# Patient Record
Sex: MALE | Race: WHITE | Employment: OTHER | URBAN - NONMETROPOLITAN AREA
[De-identification: names, ages, dates, MRNs, and addresses within clinical notes are randomized per-mention and may not be internally consistent; named-entity substitution may affect disease eponyms.]

---

## 2017-01-23 ENCOUNTER — OFFICE VISIT (OUTPATIENT)
Dept: FAMILY MEDICINE CLINIC | Age: 42
End: 2017-01-23

## 2017-01-23 VITALS
DIASTOLIC BLOOD PRESSURE: 84 MMHG | HEART RATE: 106 BPM | SYSTOLIC BLOOD PRESSURE: 124 MMHG | RESPIRATION RATE: 16 BRPM | BODY MASS INDEX: 33.5 KG/M2 | WEIGHT: 247 LBS

## 2017-01-23 DIAGNOSIS — T07.XXXA MULTIPLE TRAUMA: ICD-10-CM

## 2017-01-23 DIAGNOSIS — R52 PAIN: ICD-10-CM

## 2017-01-23 DIAGNOSIS — S22.5XXD: Primary | ICD-10-CM
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
## 2017-01-23 DIAGNOSIS — S27.339S: ICD-10-CM

## 2017-01-23 DIAGNOSIS — I10 ESSENTIAL HYPERTENSION: ICD-10-CM

## 2017-01-23 DIAGNOSIS — S06.305D: ICD-10-CM

## 2017-01-23 DIAGNOSIS — S37.031S: ICD-10-CM

## 2017-01-23 DIAGNOSIS — S06.9X5D TRAUMATIC BRAIN INJURY, WITH LOSS OF CONSCIOUSNESS GREATER THAN 24 HOURS WITH RETURN TO PRE-EXISTING CONSCIOUS LEVEL, SUBSEQUENT ENCOUNTER: ICD-10-CM

## 2017-01-23 PROBLEM — S06.9XAA TRAUMATIC BRAIN INJURY (HCC): Status: ACTIVE | Noted: 2017-01-23

## 2017-01-23 PROBLEM — S27.339A LUNG LACERATION: Status: ACTIVE | Noted: 2017-01-23

## 2017-01-23 PROBLEM — S37.031A LACERATION OF RIGHT KIDNEY: Status: ACTIVE | Noted: 2017-01-23

## 2017-01-23 PROBLEM — S06.30AA: Status: ACTIVE | Noted: 2017-01-23
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
## 2017-01-23 PROCEDURE — 99214 OFFICE O/P EST MOD 30 MIN: CPT | Performed by: FAMILY MEDICINE

## 2017-01-23 RX ORDER — AMANTADINE HYDROCHLORIDE 100 MG/1
100 CAPSULE, GELATIN COATED ORAL 2 TIMES DAILY
COMMUNITY
End: 2017-01-23 | Stop reason: SDUPTHER

## 2017-01-23 RX ORDER — HYDROCODONE BITARTRATE AND ACETAMINOPHEN 5; 325 MG/1; MG/1
1 TABLET ORAL EVERY 6 HOURS PRN
Qty: 30 TABLET | Refills: 0 | Status: SHIPPED | OUTPATIENT
Start: 2017-01-23 | End: 2017-02-01 | Stop reason: SDUPTHER

## 2017-01-23 RX ORDER — GABAPENTIN 300 MG/1
300 CAPSULE ORAL EVERY 8 HOURS
COMMUNITY
End: 2017-01-23 | Stop reason: SDUPTHER

## 2017-01-23 RX ORDER — OXYCODONE HYDROCHLORIDE 5 MG/1
5 CAPSULE ORAL EVERY 4 HOURS PRN
COMMUNITY
End: 2017-02-01 | Stop reason: ALTCHOICE

## 2017-01-23 RX ORDER — ACETAMINOPHEN 500 MG
500 TABLET ORAL EVERY 6 HOURS PRN
COMMUNITY

## 2017-01-23 RX ORDER — GABAPENTIN 300 MG/1
300 CAPSULE ORAL EVERY 8 HOURS
Qty: 90 CAPSULE | Refills: 3 | Status: SHIPPED | OUTPATIENT
Start: 2017-01-23 | End: 2017-03-15 | Stop reason: SDUPTHER

## 2017-01-23 RX ORDER — AMANTADINE HYDROCHLORIDE 100 MG/1
100 CAPSULE, GELATIN COATED ORAL 2 TIMES DAILY
Qty: 60 CAPSULE | Refills: 3 | Status: SHIPPED | OUTPATIENT
Start: 2017-01-23 | End: 2017-03-15 | Stop reason: SDUPTHER

## 2017-01-23 RX ORDER — TRAZODONE HYDROCHLORIDE 50 MG/1
50 TABLET ORAL NIGHTLY
COMMUNITY
End: 2017-01-23 | Stop reason: SDUPTHER

## 2017-01-23 RX ORDER — TRAZODONE HYDROCHLORIDE 50 MG/1
50 TABLET ORAL NIGHTLY
Qty: 30 TABLET | Refills: 3 | Status: SHIPPED | OUTPATIENT
Start: 2017-01-23 | End: 2017-03-15 | Stop reason: SDUPTHER

## 2017-01-23 RX ORDER — BUSPIRONE HYDROCHLORIDE 10 MG/1
10 TABLET ORAL 2 TIMES DAILY
Qty: 60 TABLET | Refills: 3 | Status: SHIPPED | OUTPATIENT
Start: 2017-01-23 | End: 2017-03-15 | Stop reason: SDUPTHER

## 2017-01-23 RX ORDER — LACTOBACILLUS RHAMNOSUS GG 10B CELL
1 CAPSULE ORAL DAILY
Qty: 60 CAPSULE | Refills: 1 | Status: SHIPPED | OUTPATIENT
Start: 2017-01-23 | End: 2017-03-15 | Stop reason: SDUPTHER

## 2017-01-23 RX ORDER — BUSPIRONE HYDROCHLORIDE 10 MG/1
10 TABLET ORAL 2 TIMES DAILY
COMMUNITY
End: 2017-01-23 | Stop reason: SDUPTHER

## 2017-01-23 ASSESSMENT — PATIENT HEALTH QUESTIONNAIRE - PHQ9
1. LITTLE INTEREST OR PLEASURE IN DOING THINGS: 1
SUM OF ALL RESPONSES TO PHQ QUESTIONS 1-9: 2
2. FEELING DOWN, DEPRESSED OR HOPELESS: 1
SUM OF ALL RESPONSES TO PHQ9 QUESTIONS 1 & 2: 2

## 2017-02-01 ENCOUNTER — OFFICE VISIT (OUTPATIENT)
Dept: FAMILY MEDICINE CLINIC | Age: 42
End: 2017-02-01

## 2017-02-01 VITALS
HEART RATE: 100 BPM | WEIGHT: 255 LBS | SYSTOLIC BLOOD PRESSURE: 144 MMHG | DIASTOLIC BLOOD PRESSURE: 88 MMHG | BODY MASS INDEX: 34.58 KG/M2 | OXYGEN SATURATION: 96 % | TEMPERATURE: 98.7 F

## 2017-02-01 DIAGNOSIS — I10 ESSENTIAL HYPERTENSION: ICD-10-CM

## 2017-02-01 DIAGNOSIS — J40 BRONCHITIS: Primary | ICD-10-CM

## 2017-02-01 DIAGNOSIS — T07.XXXA MULTIPLE TRAUMA: ICD-10-CM

## 2017-02-01 DIAGNOSIS — S22.5XXD: ICD-10-CM

## 2017-02-01 PROCEDURE — 99214 OFFICE O/P EST MOD 30 MIN: CPT | Performed by: FAMILY MEDICINE

## 2017-02-01 RX ORDER — HYDROCODONE BITARTRATE AND ACETAMINOPHEN 5; 325 MG/1; MG/1
1 TABLET ORAL EVERY 6 HOURS PRN
Qty: 60 TABLET | Refills: 0 | Status: SHIPPED | OUTPATIENT
Start: 2017-02-01 | End: 2017-03-01 | Stop reason: SDUPTHER

## 2017-02-01 RX ORDER — AZITHROMYCIN 250 MG/1
TABLET, FILM COATED ORAL
Qty: 1 PACKET | Refills: 0 | Status: SHIPPED | OUTPATIENT
Start: 2017-02-01 | End: 2017-03-15 | Stop reason: SDUPTHER

## 2017-02-01 RX ORDER — METHYLPREDNISOLONE 4 MG/1
TABLET ORAL
Qty: 1 KIT | Refills: 0 | Status: SHIPPED | OUTPATIENT
Start: 2017-02-01 | End: 2017-03-30 | Stop reason: ALTCHOICE

## 2017-02-01 ASSESSMENT — ENCOUNTER SYMPTOMS
RHINORRHEA: 1
COUGH: 1
SINUS PRESSURE: 0
CONSTIPATION: 0
SORE THROAT: 0
CHEST TIGHTNESS: 0
DIARRHEA: 0
VOMITING: 0
ABDOMINAL PAIN: 0
SHORTNESS OF BREATH: 0
WHEEZING: 1
NAUSEA: 0
ALLERGIC/IMMUNOLOGIC NEGATIVE: 1
BLOOD IN STOOL: 0
BACK PAIN: 0

## 2017-03-01 ENCOUNTER — OFFICE VISIT (OUTPATIENT)
Dept: FAMILY MEDICINE CLINIC | Age: 42
End: 2017-03-01

## 2017-03-01 VITALS
WEIGHT: 254 LBS | HEIGHT: 72 IN | SYSTOLIC BLOOD PRESSURE: 122 MMHG | RESPIRATION RATE: 20 BRPM | BODY MASS INDEX: 34.4 KG/M2 | DIASTOLIC BLOOD PRESSURE: 80 MMHG | OXYGEN SATURATION: 98 % | HEART RATE: 106 BPM

## 2017-03-01 DIAGNOSIS — B00.9 HERPES: ICD-10-CM

## 2017-03-01 DIAGNOSIS — S06.305S: ICD-10-CM

## 2017-03-01 DIAGNOSIS — S22.5XXD: ICD-10-CM

## 2017-03-01 DIAGNOSIS — J45.40 MODERATE PERSISTENT ASTHMA WITHOUT COMPLICATION: Primary | ICD-10-CM

## 2017-03-01 DIAGNOSIS — I10 ESSENTIAL HYPERTENSION: ICD-10-CM

## 2017-03-01 DIAGNOSIS — T07.XXXA MULTIPLE TRAUMA: ICD-10-CM

## 2017-03-01 DIAGNOSIS — S27.339S: ICD-10-CM

## 2017-03-01 DIAGNOSIS — S37.031S: ICD-10-CM
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
## 2017-03-01 DIAGNOSIS — S06.9X5S TRAUMATIC BRAIN INJURY, WITH LOSS OF CONSCIOUSNESS GREATER THAN 24 HOURS WITH RETURN TO PRE-EXISTING CONSCIOUS LEVEL, SEQUELA (HCC): ICD-10-CM

## 2017-03-01 PROBLEM — S37.031A LACERATION OF RIGHT KIDNEY: Status: RESOLVED | Noted: 2017-01-23 | Resolved: 2017-03-01

## 2017-03-01 PROCEDURE — 99214 OFFICE O/P EST MOD 30 MIN: CPT | Performed by: FAMILY MEDICINE

## 2017-03-01 RX ORDER — ALBUTEROL SULFATE 90 UG/1
2 AEROSOL, METERED RESPIRATORY (INHALATION) EVERY 6 HOURS PRN
Qty: 1 INHALER | Refills: 3 | Status: SHIPPED | OUTPATIENT
Start: 2017-03-01 | End: 2017-07-07 | Stop reason: SDUPTHER

## 2017-03-01 RX ORDER — VALACYCLOVIR HYDROCHLORIDE 1 G/1
1000 TABLET, FILM COATED ORAL 3 TIMES DAILY
Qty: 90 TABLET | Refills: 3 | Status: SHIPPED | OUTPATIENT
Start: 2017-03-01 | End: 2018-03-07 | Stop reason: SDUPTHER

## 2017-03-01 RX ORDER — HYDROCODONE BITARTRATE AND ACETAMINOPHEN 5; 325 MG/1; MG/1
1 TABLET ORAL EVERY 6 HOURS PRN
Qty: 60 TABLET | Refills: 0 | Status: SHIPPED | OUTPATIENT
Start: 2017-03-01 | End: 2017-03-15 | Stop reason: SDUPTHER

## 2017-03-05 PROBLEM — S27.339A LUNG LACERATION: Status: RESOLVED | Noted: 2017-01-23 | Resolved: 2017-03-05

## 2017-03-05 PROBLEM — S22.5XXD: Status: RESOLVED | Noted: 2017-01-23 | Resolved: 2017-03-05

## 2017-03-05 ASSESSMENT — ENCOUNTER SYMPTOMS
SINUS PRESSURE: 0
DIARRHEA: 0
NAUSEA: 0
SORE THROAT: 0
CHEST TIGHTNESS: 0
ALLERGIC/IMMUNOLOGIC NEGATIVE: 1
BACK PAIN: 0
RHINORRHEA: 1
BLOOD IN STOOL: 0
WHEEZING: 0
CONSTIPATION: 0
SHORTNESS OF BREATH: 0
VOMITING: 0
ABDOMINAL PAIN: 0

## 2017-03-15 ENCOUNTER — OFFICE VISIT (OUTPATIENT)
Dept: FAMILY MEDICINE CLINIC | Age: 42
End: 2017-03-15

## 2017-03-15 VITALS
RESPIRATION RATE: 20 BRPM | WEIGHT: 252 LBS | HEIGHT: 72 IN | OXYGEN SATURATION: 97 % | BODY MASS INDEX: 34.13 KG/M2 | DIASTOLIC BLOOD PRESSURE: 80 MMHG | HEART RATE: 116 BPM | SYSTOLIC BLOOD PRESSURE: 124 MMHG

## 2017-03-15 DIAGNOSIS — S22.5XXD: ICD-10-CM

## 2017-03-15 DIAGNOSIS — J06.9 VIRAL UPPER RESPIRATORY TRACT INFECTION: Primary | ICD-10-CM

## 2017-03-15 DIAGNOSIS — R52 PAIN: ICD-10-CM

## 2017-03-15 DIAGNOSIS — S06.9X5D TRAUMATIC BRAIN INJURY, WITH LOSS OF CONSCIOUSNESS GREATER THAN 24 HOURS WITH RETURN TO PRE-EXISTING CONSCIOUS LEVEL, SUBSEQUENT ENCOUNTER: ICD-10-CM

## 2017-03-15 DIAGNOSIS — J40 BRONCHITIS: ICD-10-CM

## 2017-03-15 DIAGNOSIS — R19.7 DIARRHEA, UNSPECIFIED TYPE: ICD-10-CM

## 2017-03-15 DIAGNOSIS — T07.XXXA MULTIPLE TRAUMA: ICD-10-CM

## 2017-03-15 PROCEDURE — 99214 OFFICE O/P EST MOD 30 MIN: CPT | Performed by: FAMILY MEDICINE

## 2017-03-15 RX ORDER — BUSPIRONE HYDROCHLORIDE 10 MG/1
10 TABLET ORAL 2 TIMES DAILY
Qty: 60 TABLET | Refills: 3 | Status: SHIPPED | OUTPATIENT
Start: 2017-03-15 | End: 2017-05-03 | Stop reason: ALTCHOICE

## 2017-03-15 RX ORDER — AMANTADINE HYDROCHLORIDE 100 MG/1
100 CAPSULE, GELATIN COATED ORAL 2 TIMES DAILY
Qty: 60 CAPSULE | Refills: 3 | Status: SHIPPED | OUTPATIENT
Start: 2017-03-15 | End: 2017-03-30 | Stop reason: SDUPTHER

## 2017-03-15 RX ORDER — PREDNISONE 10 MG/1
10 TABLET ORAL 2 TIMES DAILY
Qty: 20 TABLET | Refills: 0 | Status: SHIPPED | OUTPATIENT
Start: 2017-03-15 | End: 2017-03-30 | Stop reason: ALTCHOICE

## 2017-03-15 RX ORDER — HYDROCODONE BITARTRATE AND ACETAMINOPHEN 5; 325 MG/1; MG/1
1 TABLET ORAL EVERY 6 HOURS PRN
Qty: 60 TABLET | Refills: 0 | Status: SHIPPED | OUTPATIENT
Start: 2017-03-15 | End: 2017-03-30 | Stop reason: SDUPTHER

## 2017-03-15 RX ORDER — GABAPENTIN 300 MG/1
300 CAPSULE ORAL EVERY 8 HOURS
Qty: 90 CAPSULE | Refills: 3 | Status: SHIPPED | OUTPATIENT
Start: 2017-03-15 | End: 2017-05-03 | Stop reason: ALTCHOICE

## 2017-03-15 RX ORDER — AZITHROMYCIN 250 MG/1
TABLET, FILM COATED ORAL
Qty: 1 PACKET | Refills: 0 | Status: SHIPPED | OUTPATIENT
Start: 2017-03-15 | End: 2017-03-25

## 2017-03-15 RX ORDER — TRAZODONE HYDROCHLORIDE 50 MG/1
50 TABLET ORAL NIGHTLY
Qty: 30 TABLET | Refills: 3 | Status: SHIPPED | OUTPATIENT
Start: 2017-03-15 | End: 2017-05-03 | Stop reason: ALTCHOICE

## 2017-03-15 RX ORDER — LACTOBACILLUS RHAMNOSUS GG 10B CELL
1 CAPSULE ORAL DAILY
Qty: 60 CAPSULE | Refills: 1 | Status: SHIPPED | OUTPATIENT
Start: 2017-03-15 | End: 2017-03-30 | Stop reason: ALTCHOICE

## 2017-03-26 PROBLEM — J06.9 VIRAL UPPER RESPIRATORY TRACT INFECTION: Status: ACTIVE | Noted: 2017-03-26

## 2017-03-26 ASSESSMENT — ENCOUNTER SYMPTOMS
SORE THROAT: 0
VOMITING: 0
ALLERGIC/IMMUNOLOGIC NEGATIVE: 1
RHINORRHEA: 1
BACK PAIN: 0
NAUSEA: 0
ABDOMINAL PAIN: 0
CHEST TIGHTNESS: 0
BLOOD IN STOOL: 0
CONSTIPATION: 0
DIARRHEA: 0
SHORTNESS OF BREATH: 0
SINUS PRESSURE: 1
WHEEZING: 0

## 2017-03-30 ENCOUNTER — OFFICE VISIT (OUTPATIENT)
Dept: FAMILY MEDICINE CLINIC | Age: 42
End: 2017-03-30

## 2017-03-30 VITALS
DIASTOLIC BLOOD PRESSURE: 84 MMHG | HEIGHT: 72 IN | WEIGHT: 253 LBS | OXYGEN SATURATION: 97 % | SYSTOLIC BLOOD PRESSURE: 124 MMHG | BODY MASS INDEX: 34.27 KG/M2 | RESPIRATION RATE: 18 BRPM | HEART RATE: 85 BPM

## 2017-03-30 DIAGNOSIS — T07.XXXA MULTIPLE TRAUMA: ICD-10-CM

## 2017-03-30 DIAGNOSIS — S06.305D: ICD-10-CM

## 2017-03-30 DIAGNOSIS — S22.5XXD: ICD-10-CM

## 2017-03-30 DIAGNOSIS — S06.9X5D TRAUMATIC BRAIN INJURY, WITH LOSS OF CONSCIOUSNESS GREATER THAN 24 HOURS WITH RETURN TO PRE-EXISTING CONSCIOUS LEVEL, SUBSEQUENT ENCOUNTER: Primary | ICD-10-CM

## 2017-03-30 PROBLEM — J06.9 VIRAL UPPER RESPIRATORY TRACT INFECTION: Status: RESOLVED | Noted: 2017-03-26 | Resolved: 2017-03-30

## 2017-03-30 PROCEDURE — 99213 OFFICE O/P EST LOW 20 MIN: CPT | Performed by: FAMILY MEDICINE

## 2017-03-30 RX ORDER — HYDROCODONE BITARTRATE AND ACETAMINOPHEN 5; 325 MG/1; MG/1
1 TABLET ORAL EVERY 8 HOURS PRN
Qty: 30 TABLET | Refills: 0 | Status: SHIPPED | OUTPATIENT
Start: 2017-03-30 | End: 2017-04-29

## 2017-03-30 RX ORDER — AMANTADINE HYDROCHLORIDE 100 MG/1
100 CAPSULE, GELATIN COATED ORAL DAILY
Qty: 7 CAPSULE | Refills: 0
Start: 2017-03-30 | End: 2017-05-03 | Stop reason: ALTCHOICE

## 2017-03-30 ASSESSMENT — ENCOUNTER SYMPTOMS
RHINORRHEA: 1
ABDOMINAL PAIN: 0
SHORTNESS OF BREATH: 0
DIARRHEA: 0
WHEEZING: 0
VOMITING: 0
ALLERGIC/IMMUNOLOGIC NEGATIVE: 1
CHEST TIGHTNESS: 0
SINUS PRESSURE: 1
SORE THROAT: 0
BLOOD IN STOOL: 0
CONSTIPATION: 0
BACK PAIN: 0
NAUSEA: 0

## 2017-05-03 ENCOUNTER — OFFICE VISIT (OUTPATIENT)
Dept: FAMILY MEDICINE CLINIC | Age: 42
End: 2017-05-03

## 2017-05-03 VITALS
BODY MASS INDEX: 34.58 KG/M2 | RESPIRATION RATE: 16 BRPM | SYSTOLIC BLOOD PRESSURE: 138 MMHG | HEART RATE: 70 BPM | DIASTOLIC BLOOD PRESSURE: 88 MMHG | WEIGHT: 255 LBS

## 2017-05-03 DIAGNOSIS — S06.305S: ICD-10-CM

## 2017-05-03 DIAGNOSIS — M79.10 MYALGIA: ICD-10-CM

## 2017-05-03 DIAGNOSIS — I10 ESSENTIAL HYPERTENSION: Primary | ICD-10-CM

## 2017-05-03 PROCEDURE — 99213 OFFICE O/P EST LOW 20 MIN: CPT | Performed by: FAMILY MEDICINE

## 2017-05-03 RX ORDER — METHYLPREDNISOLONE 4 MG/1
TABLET ORAL
Qty: 1 KIT | Refills: 0 | Status: SHIPPED | OUTPATIENT
Start: 2017-05-03 | End: 2017-05-19 | Stop reason: ALTCHOICE

## 2017-05-03 RX ORDER — LISINOPRIL 10 MG/1
10 TABLET ORAL DAILY
Qty: 30 TABLET | Refills: 3 | Status: SHIPPED | OUTPATIENT
Start: 2017-05-03 | End: 2017-11-17 | Stop reason: SDUPTHER

## 2017-05-03 ASSESSMENT — ENCOUNTER SYMPTOMS
SINUS PRESSURE: 0
SORE THROAT: 0
DIARRHEA: 0
NAUSEA: 0
VOMITING: 0
RHINORRHEA: 0
ABDOMINAL PAIN: 0
WHEEZING: 0
CHEST TIGHTNESS: 0
ALLERGIC/IMMUNOLOGIC NEGATIVE: 1
BLOOD IN STOOL: 0
SHORTNESS OF BREATH: 0
CONSTIPATION: 0
BACK PAIN: 0

## 2017-05-19 ENCOUNTER — OFFICE VISIT (OUTPATIENT)
Dept: FAMILY MEDICINE CLINIC | Age: 42
End: 2017-05-19

## 2017-05-19 VITALS
OXYGEN SATURATION: 95 % | SYSTOLIC BLOOD PRESSURE: 132 MMHG | BODY MASS INDEX: 34.54 KG/M2 | HEART RATE: 105 BPM | HEIGHT: 72 IN | RESPIRATION RATE: 18 BRPM | WEIGHT: 255 LBS | DIASTOLIC BLOOD PRESSURE: 86 MMHG

## 2017-05-19 DIAGNOSIS — S06.9X5S TRAUMATIC BRAIN INJURY, WITH LOSS OF CONSCIOUSNESS GREATER THAN 24 HOURS WITH RETURN TO PRE-EXISTING CONSCIOUS LEVEL, SEQUELA (HCC): ICD-10-CM

## 2017-05-19 DIAGNOSIS — G89.29 CHRONIC LEFT-SIDED LOW BACK PAIN WITHOUT SCIATICA: Primary | Chronic | ICD-10-CM

## 2017-05-19 DIAGNOSIS — I10 ESSENTIAL HYPERTENSION: ICD-10-CM

## 2017-05-19 DIAGNOSIS — M54.50 CHRONIC LEFT-SIDED LOW BACK PAIN WITHOUT SCIATICA: Primary | Chronic | ICD-10-CM

## 2017-05-19 PROCEDURE — 99214 OFFICE O/P EST MOD 30 MIN: CPT | Performed by: FAMILY MEDICINE

## 2017-05-19 RX ORDER — TIZANIDINE 4 MG/1
4 TABLET ORAL NIGHTLY PRN
Qty: 30 TABLET | Refills: 2 | Status: SHIPPED | OUTPATIENT
Start: 2017-05-19 | End: 2017-11-17 | Stop reason: ALTCHOICE

## 2017-05-19 RX ORDER — TRAMADOL HYDROCHLORIDE 50 MG/1
50 TABLET ORAL EVERY 6 HOURS PRN
Qty: 60 TABLET | Refills: 2 | Status: SHIPPED | OUTPATIENT
Start: 2017-05-19 | End: 2017-11-17 | Stop reason: ALTCHOICE

## 2017-05-22 ASSESSMENT — ENCOUNTER SYMPTOMS
SINUS PRESSURE: 0
VOMITING: 0
WHEEZING: 0
RHINORRHEA: 0
DIARRHEA: 0
ALLERGIC/IMMUNOLOGIC NEGATIVE: 1
CONSTIPATION: 0
BACK PAIN: 0
SHORTNESS OF BREATH: 0
BLOOD IN STOOL: 0
ABDOMINAL PAIN: 0
CHEST TIGHTNESS: 0
SORE THROAT: 0
NAUSEA: 0

## 2017-07-07 ENCOUNTER — OFFICE VISIT (OUTPATIENT)
Dept: FAMILY MEDICINE CLINIC | Age: 42
End: 2017-07-07

## 2017-07-07 VITALS
BODY MASS INDEX: 33.94 KG/M2 | SYSTOLIC BLOOD PRESSURE: 128 MMHG | HEART RATE: 91 BPM | WEIGHT: 242.4 LBS | HEIGHT: 71 IN | DIASTOLIC BLOOD PRESSURE: 88 MMHG

## 2017-07-07 DIAGNOSIS — F41.9 ANXIETY: ICD-10-CM

## 2017-07-07 DIAGNOSIS — S06.305S: Primary | ICD-10-CM

## 2017-07-07 DIAGNOSIS — S06.9X5S TRAUMATIC BRAIN INJURY, WITH LOSS OF CONSCIOUSNESS GREATER THAN 24 HOURS WITH RETURN TO PRE-EXISTING CONSCIOUS LEVEL, SEQUELA (HCC): ICD-10-CM

## 2017-07-07 DIAGNOSIS — J45.40 MODERATE PERSISTENT ASTHMA WITHOUT COMPLICATION: ICD-10-CM

## 2017-07-07 DIAGNOSIS — I10 ESSENTIAL HYPERTENSION: ICD-10-CM
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
## 2017-07-07 DIAGNOSIS — T07.XXXA MULTIPLE TRAUMA: ICD-10-CM

## 2017-07-07 DIAGNOSIS — F43.25 ADJUSTMENT DISORDER WITH MIXED DISTURBANCE OF EMOTIONS AND CONDUCT: ICD-10-CM

## 2017-07-07 PROCEDURE — 99214 OFFICE O/P EST MOD 30 MIN: CPT | Performed by: FAMILY MEDICINE

## 2017-07-07 RX ORDER — DULOXETIN HYDROCHLORIDE 30 MG/1
30 CAPSULE, DELAYED RELEASE ORAL DAILY
Qty: 30 CAPSULE | Refills: 3 | Status: SHIPPED | OUTPATIENT
Start: 2017-07-07 | End: 2017-11-17 | Stop reason: SDUPTHER

## 2017-07-07 RX ORDER — ALBUTEROL SULFATE 90 UG/1
2 AEROSOL, METERED RESPIRATORY (INHALATION) EVERY 6 HOURS PRN
Qty: 1 INHALER | Refills: 3 | Status: SHIPPED | OUTPATIENT
Start: 2017-07-07 | End: 2017-11-17 | Stop reason: SDUPTHER

## 2017-07-07 ASSESSMENT — ENCOUNTER SYMPTOMS
WHEEZING: 0
CHEST TIGHTNESS: 0
BACK PAIN: 0
ABDOMINAL PAIN: 0
VOMITING: 0
CONSTIPATION: 0
SORE THROAT: 0
SHORTNESS OF BREATH: 0
RHINORRHEA: 0
BLOOD IN STOOL: 0
SINUS PRESSURE: 0
DIARRHEA: 0
ALLERGIC/IMMUNOLOGIC NEGATIVE: 1
NAUSEA: 0

## 2017-07-07 ASSESSMENT — PATIENT HEALTH QUESTIONNAIRE - PHQ9
2. FEELING DOWN, DEPRESSED OR HOPELESS: 1
1. LITTLE INTEREST OR PLEASURE IN DOING THINGS: 1
SUM OF ALL RESPONSES TO PHQ QUESTIONS 1-9: 2
SUM OF ALL RESPONSES TO PHQ9 QUESTIONS 1 & 2: 2

## 2017-07-24 ENCOUNTER — TELEPHONE (OUTPATIENT)
Dept: FAMILY MEDICINE CLINIC | Age: 42
End: 2017-07-24

## 2017-07-24 RX ORDER — ESOMEPRAZOLE MAGNESIUM 40 MG/1
40 CAPSULE, DELAYED RELEASE ORAL DAILY
Qty: 30 CAPSULE | Refills: 3 | Status: SHIPPED | OUTPATIENT
Start: 2017-07-24 | End: 2017-11-17 | Stop reason: SDUPTHER

## 2017-11-17 ENCOUNTER — OFFICE VISIT (OUTPATIENT)
Dept: FAMILY MEDICINE CLINIC | Age: 42
End: 2017-11-17

## 2017-11-17 VITALS
DIASTOLIC BLOOD PRESSURE: 88 MMHG | HEART RATE: 112 BPM | RESPIRATION RATE: 16 BRPM | OXYGEN SATURATION: 95 % | BODY MASS INDEX: 32.92 KG/M2 | WEIGHT: 236 LBS | SYSTOLIC BLOOD PRESSURE: 136 MMHG

## 2017-11-17 DIAGNOSIS — J01.90 ACUTE BACTERIAL SINUSITIS: ICD-10-CM

## 2017-11-17 DIAGNOSIS — S06.305S: ICD-10-CM

## 2017-11-17 DIAGNOSIS — F90.2 ATTENTION DEFICIT HYPERACTIVITY DISORDER (ADHD), COMBINED TYPE: ICD-10-CM
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
## 2017-11-17 DIAGNOSIS — F41.9 ANXIETY: ICD-10-CM

## 2017-11-17 DIAGNOSIS — Z72.0 TOBACCO USE: ICD-10-CM

## 2017-11-17 DIAGNOSIS — R53.83 FATIGUE, UNSPECIFIED TYPE: ICD-10-CM

## 2017-11-17 DIAGNOSIS — K21.9 GASTROESOPHAGEAL REFLUX DISEASE WITHOUT ESOPHAGITIS: ICD-10-CM

## 2017-11-17 DIAGNOSIS — S06.9X5S TRAUMATIC BRAIN INJURY, WITH LOSS OF CONSCIOUSNESS GREATER THAN 24 HOURS WITH RETURN TO PRE-EXISTING CONSCIOUS LEVEL, SEQUELA (HCC): ICD-10-CM

## 2017-11-17 DIAGNOSIS — F43.25 ADJUSTMENT DISORDER WITH MIXED DISTURBANCE OF EMOTIONS AND CONDUCT: ICD-10-CM

## 2017-11-17 DIAGNOSIS — R44.8 FEELS COLD: ICD-10-CM

## 2017-11-17 DIAGNOSIS — B96.89 ACUTE BACTERIAL SINUSITIS: ICD-10-CM

## 2017-11-17 DIAGNOSIS — I10 ESSENTIAL HYPERTENSION: ICD-10-CM

## 2017-11-17 DIAGNOSIS — J45.40 MODERATE PERSISTENT ASTHMA WITHOUT COMPLICATION: Primary | ICD-10-CM

## 2017-11-17 LAB
A/G RATIO: 1.6 (ref 1.1–2.2)
ALBUMIN SERPL-MCNC: 4.5 G/DL (ref 3.4–5)
ALP BLD-CCNC: 112 U/L (ref 40–129)
ALT SERPL-CCNC: 27 U/L (ref 10–40)
ANION GAP SERPL CALCULATED.3IONS-SCNC: 16 MMOL/L (ref 3–16)
AST SERPL-CCNC: 14 U/L (ref 15–37)
BASOPHILS ABSOLUTE: 0.1 K/UL (ref 0–0.2)
BASOPHILS RELATIVE PERCENT: 0.7 %
BILIRUB SERPL-MCNC: 0.4 MG/DL (ref 0–1)
BUN BLDV-MCNC: 10 MG/DL (ref 7–20)
C-REACTIVE PROTEIN: 3.1 MG/L (ref 0–5.1)
CALCIUM SERPL-MCNC: 9.7 MG/DL (ref 8.3–10.6)
CHLORIDE BLD-SCNC: 100 MMOL/L (ref 99–110)
CO2: 28 MMOL/L (ref 21–32)
CREAT SERPL-MCNC: 0.8 MG/DL (ref 0.9–1.3)
EOSINOPHILS ABSOLUTE: 0.5 K/UL (ref 0–0.6)
EOSINOPHILS RELATIVE PERCENT: 4.5 %
GFR AFRICAN AMERICAN: >60
GFR NON-AFRICAN AMERICAN: >60
GLOBULIN: 2.9 G/DL
GLUCOSE BLD-MCNC: 93 MG/DL (ref 70–99)
HCT VFR BLD CALC: 50.6 % (ref 40.5–52.5)
HEMOGLOBIN: 16.9 G/DL (ref 13.5–17.5)
LYMPHOCYTES ABSOLUTE: 2.1 K/UL (ref 1–5.1)
LYMPHOCYTES RELATIVE PERCENT: 18.8 %
MCH RBC QN AUTO: 31.1 PG (ref 26–34)
MCHC RBC AUTO-ENTMCNC: 33.4 G/DL (ref 31–36)
MCV RBC AUTO: 93.2 FL (ref 80–100)
MONOCYTES ABSOLUTE: 1 K/UL (ref 0–1.3)
MONOCYTES RELATIVE PERCENT: 9.1 %
NEUTROPHILS ABSOLUTE: 7.4 K/UL (ref 1.7–7.7)
NEUTROPHILS RELATIVE PERCENT: 66.9 %
PDW BLD-RTO: 13.8 % (ref 12.4–15.4)
PLATELET # BLD: 291 K/UL (ref 135–450)
PMV BLD AUTO: 8.4 FL (ref 5–10.5)
POTASSIUM SERPL-SCNC: 4 MMOL/L (ref 3.5–5.1)
RBC # BLD: 5.44 M/UL (ref 4.2–5.9)
SODIUM BLD-SCNC: 144 MMOL/L (ref 136–145)
TOTAL PROTEIN: 7.4 G/DL (ref 6.4–8.2)
TSH SERPL DL<=0.05 MIU/L-ACNC: 0.67 UIU/ML (ref 0.27–4.2)
WBC # BLD: 11.1 K/UL (ref 4–11)

## 2017-11-17 PROCEDURE — 99214 OFFICE O/P EST MOD 30 MIN: CPT | Performed by: FAMILY MEDICINE

## 2017-11-17 PROCEDURE — 4004F PT TOBACCO SCREEN RCVD TLK: CPT | Performed by: FAMILY MEDICINE

## 2017-11-17 PROCEDURE — G8417 CALC BMI ABV UP PARAM F/U: HCPCS | Performed by: FAMILY MEDICINE

## 2017-11-17 PROCEDURE — G8427 DOCREV CUR MEDS BY ELIG CLIN: HCPCS | Performed by: FAMILY MEDICINE

## 2017-11-17 PROCEDURE — G8484 FLU IMMUNIZE NO ADMIN: HCPCS | Performed by: FAMILY MEDICINE

## 2017-11-17 RX ORDER — LISINOPRIL 20 MG/1
20 TABLET ORAL DAILY
Qty: 90 TABLET | Refills: 3 | Status: SHIPPED | OUTPATIENT
Start: 2017-11-17 | End: 2018-03-07 | Stop reason: SDUPTHER

## 2017-11-17 RX ORDER — ESOMEPRAZOLE MAGNESIUM 40 MG/1
40 CAPSULE, DELAYED RELEASE ORAL DAILY
Qty: 30 CAPSULE | Refills: 3 | Status: SHIPPED | OUTPATIENT
Start: 2017-11-17 | End: 2017-11-27 | Stop reason: ALTCHOICE

## 2017-11-17 RX ORDER — DULOXETIN HYDROCHLORIDE 30 MG/1
30 CAPSULE, DELAYED RELEASE ORAL DAILY
Qty: 30 CAPSULE | Refills: 11 | Status: SHIPPED | OUTPATIENT
Start: 2017-11-17 | End: 2018-12-04 | Stop reason: DRUGHIGH

## 2017-11-17 RX ORDER — AZITHROMYCIN 250 MG/1
TABLET, FILM COATED ORAL
Qty: 1 PACKET | Refills: 0 | Status: SHIPPED | OUTPATIENT
Start: 2017-11-17 | End: 2018-03-07

## 2017-11-17 RX ORDER — ALBUTEROL SULFATE 90 UG/1
2 AEROSOL, METERED RESPIRATORY (INHALATION) EVERY 6 HOURS PRN
Qty: 1 INHALER | Refills: 3 | Status: SHIPPED | OUTPATIENT
Start: 2017-11-17 | End: 2018-03-07 | Stop reason: SDUPTHER

## 2017-11-17 ASSESSMENT — PATIENT HEALTH QUESTIONNAIRE - PHQ9
SUM OF ALL RESPONSES TO PHQ QUESTIONS 1-9: 0
SUM OF ALL RESPONSES TO PHQ9 QUESTIONS 1 & 2: 0
1. LITTLE INTEREST OR PLEASURE IN DOING THINGS: 0
2. FEELING DOWN, DEPRESSED OR HOPELESS: 0
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
## 2017-11-19 ASSESSMENT — ENCOUNTER SYMPTOMS
NAUSEA: 0
SINUS PRESSURE: 0
BACK PAIN: 0
DIARRHEA: 0
SHORTNESS OF BREATH: 0
ALLERGIC/IMMUNOLOGIC NEGATIVE: 1
BLOOD IN STOOL: 0
ABDOMINAL PAIN: 0
CHEST TIGHTNESS: 0
VOMITING: 0
WHEEZING: 0
SORE THROAT: 0
RHINORRHEA: 0
CONSTIPATION: 0
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
## 2017-11-20 NOTE — ASSESSMENT & PLAN NOTE
/88   Pulse 112   Resp 16   Wt 236 lb (107 kg)   SpO2 95%   BMI 32.92 kg/m²   On lisinopril with borderline bp

## 2018-03-07 ENCOUNTER — OFFICE VISIT (OUTPATIENT)
Dept: FAMILY MEDICINE CLINIC | Age: 43
End: 2018-03-07

## 2018-03-07 VITALS
RESPIRATION RATE: 16 BRPM | SYSTOLIC BLOOD PRESSURE: 152 MMHG | HEIGHT: 69 IN | DIASTOLIC BLOOD PRESSURE: 88 MMHG | BODY MASS INDEX: 38.66 KG/M2 | HEART RATE: 87 BPM | WEIGHT: 261 LBS | OXYGEN SATURATION: 98 %

## 2018-03-07 DIAGNOSIS — J45.40 MODERATE PERSISTENT ASTHMA WITHOUT COMPLICATION: ICD-10-CM

## 2018-03-07 DIAGNOSIS — I10 ESSENTIAL HYPERTENSION: ICD-10-CM

## 2018-03-07 DIAGNOSIS — B00.9 HERPES: ICD-10-CM

## 2018-03-07 DIAGNOSIS — S06.9X5S TRAUMATIC BRAIN INJURY, WITH LOSS OF CONSCIOUSNESS GREATER THAN 24 HOURS WITH RETURN TO PRE-EXISTING CONSCIOUS LEVEL, SEQUELA (HCC): ICD-10-CM

## 2018-03-07 DIAGNOSIS — S06.305S: ICD-10-CM

## 2018-03-07 DIAGNOSIS — J45.41 MODERATE PERSISTENT ASTHMA WITH ACUTE EXACERBATION: Primary | ICD-10-CM

## 2018-03-07 DIAGNOSIS — F43.25 ADJUSTMENT DISORDER WITH MIXED DISTURBANCE OF EMOTIONS AND CONDUCT: ICD-10-CM

## 2018-03-07 DIAGNOSIS — Z23 NEED FOR PROPHYLACTIC VACCINATION AGAINST STREPTOCOCCUS PNEUMONIAE (PNEUMOCOCCUS): ICD-10-CM

## 2018-03-07 PROBLEM — R44.8 FEELS COLD: Status: RESOLVED | Noted: 2017-11-17 | Resolved: 2018-03-07

## 2018-03-07 PROBLEM — M79.10 MYALGIA: Status: RESOLVED | Noted: 2017-05-03 | Resolved: 2018-03-07

## 2018-03-07 PROCEDURE — G8427 DOCREV CUR MEDS BY ELIG CLIN: HCPCS | Performed by: FAMILY MEDICINE

## 2018-03-07 PROCEDURE — 90471 IMMUNIZATION ADMIN: CPT | Performed by: FAMILY MEDICINE

## 2018-03-07 PROCEDURE — 99214 OFFICE O/P EST MOD 30 MIN: CPT | Performed by: FAMILY MEDICINE

## 2018-03-07 PROCEDURE — G8484 FLU IMMUNIZE NO ADMIN: HCPCS | Performed by: FAMILY MEDICINE

## 2018-03-07 PROCEDURE — 4004F PT TOBACCO SCREEN RCVD TLK: CPT | Performed by: FAMILY MEDICINE

## 2018-03-07 PROCEDURE — G8417 CALC BMI ABV UP PARAM F/U: HCPCS | Performed by: FAMILY MEDICINE

## 2018-03-07 PROCEDURE — 90732 PPSV23 VACC 2 YRS+ SUBQ/IM: CPT | Performed by: FAMILY MEDICINE

## 2018-03-07 RX ORDER — ALBUTEROL SULFATE 90 UG/1
2 AEROSOL, METERED RESPIRATORY (INHALATION) EVERY 6 HOURS PRN
Qty: 1 INHALER | Refills: 3 | Status: SHIPPED | OUTPATIENT
Start: 2018-03-07 | End: 2018-04-08 | Stop reason: SDUPTHER

## 2018-03-07 RX ORDER — VALACYCLOVIR HYDROCHLORIDE 1 G/1
1000 TABLET, FILM COATED ORAL 3 TIMES DAILY
Qty: 90 TABLET | Refills: 3 | Status: SHIPPED | OUTPATIENT
Start: 2018-03-07 | End: 2018-12-04 | Stop reason: SDUPTHER

## 2018-03-07 RX ORDER — FLUTICASONE FUROATE AND VILANTEROL 200; 25 UG/1; UG/1
1 POWDER RESPIRATORY (INHALATION) DAILY
Qty: 1 EACH | Refills: 5 | Status: SHIPPED | OUTPATIENT
Start: 2018-03-07 | End: 2018-11-29 | Stop reason: SDUPTHER

## 2018-03-07 RX ORDER — LISINOPRIL 20 MG/1
20 TABLET ORAL DAILY
Qty: 90 TABLET | Refills: 3 | Status: ON HOLD | OUTPATIENT
Start: 2018-03-07 | End: 2020-03-04

## 2018-03-07 ASSESSMENT — ENCOUNTER SYMPTOMS
DIARRHEA: 0
WHEEZING: 0
SINUS PRESSURE: 0
VOMITING: 0
BACK PAIN: 0
RHINORRHEA: 0
BLOOD IN STOOL: 0
NAUSEA: 0
ABDOMINAL PAIN: 0
SHORTNESS OF BREATH: 0
CHEST TIGHTNESS: 0
ALLERGIC/IMMUNOLOGIC NEGATIVE: 1
CONSTIPATION: 0
SORE THROAT: 0
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
## 2018-03-20 ENCOUNTER — OFFICE VISIT (OUTPATIENT)
Dept: FAMILY MEDICINE CLINIC | Age: 43
End: 2018-03-20

## 2018-03-20 VITALS
TEMPERATURE: 98.9 F | WEIGHT: 255.2 LBS | SYSTOLIC BLOOD PRESSURE: 126 MMHG | DIASTOLIC BLOOD PRESSURE: 86 MMHG | BODY MASS INDEX: 37.8 KG/M2 | HEIGHT: 69 IN | RESPIRATION RATE: 16 BRPM | HEART RATE: 102 BPM | OXYGEN SATURATION: 98 %

## 2018-03-20 DIAGNOSIS — J45.20 MILD INTERMITTENT ASTHMA WITHOUT COMPLICATION: ICD-10-CM

## 2018-03-20 DIAGNOSIS — J01.90 ACUTE BACTERIAL SINUSITIS: Primary | ICD-10-CM

## 2018-03-20 DIAGNOSIS — B96.89 ACUTE BACTERIAL SINUSITIS: Primary | ICD-10-CM

## 2018-03-20 PROCEDURE — G8427 DOCREV CUR MEDS BY ELIG CLIN: HCPCS | Performed by: FAMILY MEDICINE

## 2018-03-20 PROCEDURE — 99213 OFFICE O/P EST LOW 20 MIN: CPT | Performed by: FAMILY MEDICINE

## 2018-03-20 PROCEDURE — G8417 CALC BMI ABV UP PARAM F/U: HCPCS | Performed by: FAMILY MEDICINE

## 2018-03-20 PROCEDURE — 4004F PT TOBACCO SCREEN RCVD TLK: CPT | Performed by: FAMILY MEDICINE

## 2018-03-20 PROCEDURE — G8484 FLU IMMUNIZE NO ADMIN: HCPCS | Performed by: FAMILY MEDICINE

## 2018-03-20 RX ORDER — AZITHROMYCIN 250 MG/1
TABLET, FILM COATED ORAL
Qty: 1 PACKET | Refills: 0 | Status: SHIPPED | OUTPATIENT
Start: 2018-03-20 | End: 2018-03-30

## 2018-03-20 RX ORDER — BENZONATATE 200 MG/1
200 CAPSULE ORAL 3 TIMES DAILY PRN
Qty: 30 CAPSULE | Refills: 1 | Status: SHIPPED | OUTPATIENT
Start: 2018-03-20 | End: 2018-03-27

## 2018-03-20 NOTE — PROGRESS NOTES
Chief Complaint   Patient presents with    Cough     x2 days    Congestion     wheezing    Headache     SUBJECTIVE:  2 days worsening congestion and wheeze headache. Denies fever. OBJECTIVE:  /86   Pulse 102   Temp 98.9 °F (37.2 °C) (Oral)   Resp 16   Ht 5' 9\" (1.753 m)   Wt 255 lb 3.2 oz (115.8 kg)   SpO2 98%   BMI 37.69 kg/m²   Head moderate congestion  Tympanic membranes normal bilaterally  Pharynx clear  No cervical lymphadenopathy  Lungs clear bilaterally no wheezes no rhonchi heart regular rhythm and rate without a murmur  Abdomen soft nontender without masses    Assessment/Plan:  Marcela Mcclain was seen today for cough, congestion and headache. Diagnoses and all orders for this visit:    Acute bacterial sinusitis  -     azithromycin (ZITHROMAX) 250 MG tablet; Take 2 tabs (500 mg) on Day 1, and take 1 tab (250 mg) on days 2 through 5.  -     benzonatate (TESSALON) 200 MG capsule;  Take 1 capsule by mouth 3 times daily as needed for Cough    Mild intermittent asthma without complication

## 2018-04-08 ENCOUNTER — TELEPHONE (OUTPATIENT)
Dept: INTERNAL MEDICINE CLINIC | Age: 43
End: 2018-04-08

## 2018-04-08 DIAGNOSIS — J45.40 MODERATE PERSISTENT ASTHMA WITHOUT COMPLICATION: ICD-10-CM

## 2018-04-08 DIAGNOSIS — J45.20 MILD INTERMITTENT ASTHMA WITHOUT COMPLICATION: Primary | ICD-10-CM

## 2018-04-08 DIAGNOSIS — J45.909 PERSISTENT ASTHMA WITHOUT COMPLICATION, UNSPECIFIED ASTHMA SEVERITY: Primary | ICD-10-CM

## 2018-04-08 RX ORDER — IPRATROPIUM BROMIDE AND ALBUTEROL SULFATE 2.5; .5 MG/3ML; MG/3ML
1 SOLUTION RESPIRATORY (INHALATION) EVERY 4 HOURS
Qty: 360 ML | Refills: 3 | Status: SHIPPED | OUTPATIENT
Start: 2018-04-08 | End: 2018-12-04 | Stop reason: SDUPTHER

## 2018-04-08 RX ORDER — NEBULIZER ACCESSORIES
1 KIT MISCELLANEOUS DAILY PRN
Qty: 1 KIT | Refills: 0 | Status: SHIPPED | OUTPATIENT
Start: 2018-04-08

## 2018-04-08 RX ORDER — NEBULIZER AND COMPRESSOR
1 EACH MISCELLANEOUS ONCE
Qty: 1 EACH | Refills: 0 | Status: SHIPPED | OUTPATIENT
Start: 2018-04-08 | End: 2022-09-08

## 2018-04-08 RX ORDER — ALBUTEROL SULFATE 90 UG/1
2 AEROSOL, METERED RESPIRATORY (INHALATION) EVERY 4 HOURS PRN
Qty: 1 INHALER | Refills: 3 | Status: SHIPPED | OUTPATIENT
Start: 2018-04-08 | End: 2018-07-16 | Stop reason: SDUPTHER

## 2018-04-12 ENCOUNTER — TELEPHONE (OUTPATIENT)
Dept: FAMILY MEDICINE CLINIC | Age: 43
End: 2018-04-12

## 2018-04-12 ENCOUNTER — HOSPITAL ENCOUNTER (OUTPATIENT)
Dept: PULMONOLOGY | Age: 43
Discharge: HOME | End: 2018-04-12
Attending: FAMILY MEDICINE | Admitting: FAMILY MEDICINE

## 2018-04-17 DIAGNOSIS — J45.40 MODERATE PERSISTENT ASTHMA WITHOUT COMPLICATION: ICD-10-CM

## 2018-07-16 DIAGNOSIS — J45.40 MODERATE PERSISTENT ASTHMA WITHOUT COMPLICATION: ICD-10-CM

## 2018-07-16 RX ORDER — ALBUTEROL SULFATE 90 UG/1
2 AEROSOL, METERED RESPIRATORY (INHALATION) EVERY 4 HOURS PRN
Qty: 1 INHALER | Refills: 3 | Status: SHIPPED | OUTPATIENT
Start: 2018-07-16 | End: 2018-11-30

## 2018-09-17 ENCOUNTER — TELEPHONE (OUTPATIENT)
Dept: FAMILY MEDICINE CLINIC | Age: 43
End: 2018-09-17

## 2018-09-17 NOTE — TELEPHONE ENCOUNTER
Pt cld & left vm on ma line wanting us to call him. I left him a vm to call back to see how we could help him.     Pt's ph# [phone]

## 2018-09-21 NOTE — TELEPHONE ENCOUNTER
Pt was going to see if we were accepting new patients. I advised Dr Justo Jha is not; however our Nurse Practitioners and our Physician Assistant are accepting new patients. He will tell his girlfriend and have her call us. She needed a PCP.

## 2018-10-18 ENCOUNTER — TELEPHONE (OUTPATIENT)
Dept: FAMILY MEDICINE CLINIC | Age: 43
End: 2018-10-18

## 2018-12-04 ENCOUNTER — OFFICE VISIT (OUTPATIENT)
Dept: FAMILY MEDICINE CLINIC | Age: 43
End: 2018-12-04
Payer: COMMERCIAL

## 2018-12-04 VITALS
BODY MASS INDEX: 35.81 KG/M2 | SYSTOLIC BLOOD PRESSURE: 148 MMHG | HEIGHT: 69 IN | WEIGHT: 241.8 LBS | TEMPERATURE: 98.4 F | DIASTOLIC BLOOD PRESSURE: 98 MMHG | HEART RATE: 112 BPM | RESPIRATION RATE: 22 BRPM

## 2018-12-04 DIAGNOSIS — K21.9 GASTROESOPHAGEAL REFLUX DISEASE WITHOUT ESOPHAGITIS: ICD-10-CM

## 2018-12-04 DIAGNOSIS — I10 ESSENTIAL HYPERTENSION: ICD-10-CM

## 2018-12-04 DIAGNOSIS — J45.40 MODERATE PERSISTENT ASTHMA WITHOUT COMPLICATION: ICD-10-CM

## 2018-12-04 DIAGNOSIS — B00.9 HERPES: ICD-10-CM

## 2018-12-04 DIAGNOSIS — J20.8 ACUTE BRONCHITIS DUE TO OTHER SPECIFIED ORGANISMS: ICD-10-CM

## 2018-12-04 DIAGNOSIS — J45.20 MILD INTERMITTENT ASTHMA WITHOUT COMPLICATION: ICD-10-CM

## 2018-12-04 DIAGNOSIS — J20.9 ACUTE BRONCHITIS, UNSPECIFIED ORGANISM: Primary | ICD-10-CM

## 2018-12-04 DIAGNOSIS — F41.9 ANXIETY: ICD-10-CM

## 2018-12-04 DIAGNOSIS — F43.25 ADJUSTMENT DISORDER WITH MIXED DISTURBANCE OF EMOTIONS AND CONDUCT: ICD-10-CM

## 2018-12-04 PROCEDURE — 99214 OFFICE O/P EST MOD 30 MIN: CPT | Performed by: PHYSICIAN ASSISTANT

## 2018-12-04 PROCEDURE — 4004F PT TOBACCO SCREEN RCVD TLK: CPT | Performed by: PHYSICIAN ASSISTANT

## 2018-12-04 PROCEDURE — G8484 FLU IMMUNIZE NO ADMIN: HCPCS | Performed by: PHYSICIAN ASSISTANT

## 2018-12-04 PROCEDURE — G8427 DOCREV CUR MEDS BY ELIG CLIN: HCPCS | Performed by: PHYSICIAN ASSISTANT

## 2018-12-04 PROCEDURE — G8417 CALC BMI ABV UP PARAM F/U: HCPCS | Performed by: PHYSICIAN ASSISTANT

## 2018-12-04 RX ORDER — FLUTICASONE FUROATE AND VILANTEROL 200; 25 UG/1; UG/1
1 POWDER RESPIRATORY (INHALATION) DAILY
Qty: 1 EACH | Refills: 5 | Status: ON HOLD | OUTPATIENT
Start: 2018-12-04 | End: 2020-03-04

## 2018-12-04 RX ORDER — DULOXETIN HYDROCHLORIDE 30 MG/1
30 CAPSULE, DELAYED RELEASE ORAL DAILY
Qty: 30 CAPSULE | Refills: 11 | Status: CANCELLED | OUTPATIENT
Start: 2018-12-04

## 2018-12-04 RX ORDER — DULOXETIN HYDROCHLORIDE 60 MG/1
60 CAPSULE, DELAYED RELEASE ORAL DAILY
Qty: 30 CAPSULE | Refills: 5 | Status: ON HOLD | OUTPATIENT
Start: 2018-12-04 | End: 2020-03-04

## 2018-12-04 RX ORDER — AZITHROMYCIN 250 MG/1
250 TABLET, FILM COATED ORAL SEE ADMIN INSTRUCTIONS
Qty: 6 TABLET | Refills: 0 | Status: SHIPPED | OUTPATIENT
Start: 2018-12-04 | End: 2018-12-09

## 2018-12-04 RX ORDER — BENZONATATE 100 MG/1
100 CAPSULE ORAL 3 TIMES DAILY PRN
Qty: 30 CAPSULE | Refills: 0 | Status: SHIPPED | OUTPATIENT
Start: 2018-12-04 | End: 2018-12-11

## 2018-12-04 RX ORDER — VALACYCLOVIR HYDROCHLORIDE 1 G/1
1000 TABLET, FILM COATED ORAL 3 TIMES DAILY
Qty: 90 TABLET | Refills: 5 | Status: ON HOLD | OUTPATIENT
Start: 2018-12-04 | End: 2020-03-04

## 2018-12-04 RX ORDER — ALBUTEROL SULFATE 90 UG/1
2 AEROSOL, METERED RESPIRATORY (INHALATION) EVERY 6 HOURS PRN
Qty: 1 INHALER | Refills: 5 | Status: SHIPPED | OUTPATIENT
Start: 2018-12-04 | End: 2022-05-03 | Stop reason: SDUPTHER

## 2018-12-04 RX ORDER — METHYLPREDNISOLONE 4 MG/1
TABLET ORAL
Qty: 1 KIT | Refills: 0 | Status: SHIPPED | OUTPATIENT
Start: 2018-12-04 | End: 2018-12-10

## 2018-12-04 RX ORDER — IPRATROPIUM BROMIDE AND ALBUTEROL SULFATE 2.5; .5 MG/3ML; MG/3ML
1 SOLUTION RESPIRATORY (INHALATION) EVERY 4 HOURS
Qty: 360 ML | Refills: 5 | Status: ON HOLD | OUTPATIENT
Start: 2018-12-04 | End: 2020-03-04

## 2018-12-04 ASSESSMENT — PATIENT HEALTH QUESTIONNAIRE - PHQ9
SUM OF ALL RESPONSES TO PHQ QUESTIONS 1-9: 0
1. LITTLE INTEREST OR PLEASURE IN DOING THINGS: 0
SUM OF ALL RESPONSES TO PHQ QUESTIONS 1-9: 0
2. FEELING DOWN, DEPRESSED OR HOPELESS: 0
SUM OF ALL RESPONSES TO PHQ9 QUESTIONS 1 & 2: 0

## 2018-12-04 ASSESSMENT — ENCOUNTER SYMPTOMS
SINUS PAIN: 0
EYE DISCHARGE: 0
SORE THROAT: 0
SINUS PRESSURE: 0
DIARRHEA: 0
TROUBLE SWALLOWING: 0
NAUSEA: 0
WHEEZING: 1
SHORTNESS OF BREATH: 1
VOMITING: 0
RHINORRHEA: 1
COUGH: 1

## 2018-12-04 NOTE — PROGRESS NOTES
inhaler    Fluticasone Furoate-Vilanterol (BREO ELLIPTA) 200-25 MCG/INH AEPB    Acute bronchitis due to other specified organisms     Zpack, tessalon perles, medrol dose pack,  Rest/fluids/healhty diet            Other    Herpes     Refill medication         Relevant Medications    valACYclovir (VALTREX) 1 g tablet    Anxiety    Relevant Medications    DULoxetine (CYMBALTA) 60 MG extended release capsule    Adjustment disorder with mixed disturbance of emotions and conduct     Will increase cymbalta to 60 mg, Risks/benefits/SE reviewed, pt voices understanding  Recheck in 4-6 weeks if not improving, sooner if worse         Relevant Medications    DULoxetine (CYMBALTA) 60 MG extended release capsule               Return in about 4 months (around 4/4/2019) for CPE.

## 2019-10-15 ENCOUNTER — OFFICE VISIT (OUTPATIENT)
Dept: FAMILY MEDICINE CLINIC | Age: 44
End: 2019-10-15
Payer: COMMERCIAL

## 2019-10-15 VITALS
BODY MASS INDEX: 38.57 KG/M2 | OXYGEN SATURATION: 96 % | DIASTOLIC BLOOD PRESSURE: 100 MMHG | HEART RATE: 92 BPM | WEIGHT: 261.2 LBS | TEMPERATURE: 98.8 F | SYSTOLIC BLOOD PRESSURE: 140 MMHG

## 2019-10-15 DIAGNOSIS — R03.0 ELEVATED BLOOD PRESSURE READING: ICD-10-CM

## 2019-10-15 DIAGNOSIS — R21 RASH: Primary | ICD-10-CM

## 2019-10-15 PROCEDURE — 99213 OFFICE O/P EST LOW 20 MIN: CPT | Performed by: NURSE PRACTITIONER

## 2019-10-15 RX ORDER — TRIAMCINOLONE ACETONIDE 1 MG/G
CREAM TOPICAL
Qty: 1 TUBE | Refills: 0 | Status: ON HOLD | OUTPATIENT
Start: 2019-10-15 | End: 2020-03-04

## 2019-11-17 ASSESSMENT — ENCOUNTER SYMPTOMS
NAIL CHANGES: 0
EYE PAIN: 0
DIARRHEA: 0
RHINORRHEA: 0
NAUSEA: 0
COUGH: 0
VOMITING: 0
RESPIRATORY NEGATIVE: 1
SORE THROAT: 0
SHORTNESS OF BREATH: 0

## 2020-03-03 ENCOUNTER — APPOINTMENT (OUTPATIENT)
Dept: CT IMAGING | Age: 45
DRG: 871 | End: 2020-03-03
Payer: MEDICARE

## 2020-03-03 ENCOUNTER — APPOINTMENT (OUTPATIENT)
Dept: GENERAL RADIOLOGY | Age: 45
DRG: 871 | End: 2020-03-03
Payer: MEDICARE

## 2020-03-03 ENCOUNTER — HOSPITAL ENCOUNTER (INPATIENT)
Age: 45
LOS: 3 days | Discharge: HOME | DRG: 871 | End: 2020-03-06
Attending: EMERGENCY MEDICINE | Admitting: INTERNAL MEDICINE
Payer: MEDICARE

## 2020-03-03 PROBLEM — J18.9 PNEUMONIA: Status: ACTIVE | Noted: 2020-03-03

## 2020-03-03 LAB
ABO/RH: NORMAL
ALBUMIN SERPL-MCNC: 4.4 GM/DL (ref 3.4–5)
ALP BLD-CCNC: 100 IU/L (ref 40–128)
ALT SERPL-CCNC: 28 U/L (ref 10–40)
ANION GAP SERPL CALCULATED.3IONS-SCNC: 13 MMOL/L (ref 4–16)
ANTIBODY SCREEN: NEGATIVE
AST SERPL-CCNC: 16 IU/L (ref 15–37)
BASE EXCESS: ABNORMAL (ref 0–3.3)
BASOPHILS ABSOLUTE: 0 K/CU MM
BASOPHILS RELATIVE PERCENT: 0.2 % (ref 0–1)
BILIRUB SERPL-MCNC: 0.7 MG/DL (ref 0–1)
BUN BLDV-MCNC: 23 MG/DL (ref 6–23)
CALCIUM SERPL-MCNC: 8.8 MG/DL (ref 8.3–10.6)
CHLORIDE BLD-SCNC: 101 MMOL/L (ref 99–110)
CO2: 22 MMOL/L (ref 21–32)
COMMENT: ABNORMAL
CREAT SERPL-MCNC: 0.9 MG/DL (ref 0.9–1.3)
DIFFERENTIAL TYPE: ABNORMAL
EOSINOPHILS ABSOLUTE: 0 K/CU MM
EOSINOPHILS RELATIVE PERCENT: 0.3 % (ref 0–3)
GFR AFRICAN AMERICAN: >60 ML/MIN/1.73M2
GFR NON-AFRICAN AMERICAN: >60 ML/MIN/1.73M2
GLUCOSE BLD-MCNC: 147 MG/DL (ref 70–99)
HCO3 VENOUS: 23.1 MMOL/L (ref 19–25)
HCT VFR BLD CALC: 54.8 % (ref 42–52)
HEMOGLOBIN: 17.7 GM/DL (ref 13.5–18)
IMMATURE NEUTROPHIL %: 0.4 % (ref 0–0.43)
LACTATE: 2.5 MMOL/L (ref 0.4–2)
LACTATE: ABNORMAL MMOL/L (ref 0.4–2)
LIPASE: 13 IU/L (ref 13–60)
LYMPHOCYTES ABSOLUTE: 0.4 K/CU MM
LYMPHOCYTES RELATIVE PERCENT: 4.4 % (ref 24–44)
MCH RBC QN AUTO: 29.8 PG (ref 27–31)
MCHC RBC AUTO-ENTMCNC: 32.3 % (ref 32–36)
MCV RBC AUTO: 92.3 FL (ref 78–100)
MONOCYTES ABSOLUTE: 0.5 K/CU MM
MONOCYTES RELATIVE PERCENT: 5 % (ref 0–4)
NUCLEATED RBC %: 0 %
O2 SAT, VEN: 76 % (ref 50–70)
PCO2, VEN: 54 MMHG (ref 38–52)
PDW BLD-RTO: 12.4 % (ref 11.7–14.9)
PH VENOUS: 7.24 (ref 7.32–7.42)
PLATELET # BLD: 325 K/CU MM (ref 140–440)
PMV BLD AUTO: 9.5 FL (ref 7.5–11.1)
PO2, VEN: 42 MMHG (ref 28–48)
POTASSIUM SERPL-SCNC: 4.2 MMOL/L (ref 3.5–5.1)
PRO-BNP: 8.39 PG/ML
RAPID INFLUENZA  B AGN: NEGATIVE
RAPID INFLUENZA A AGN: NEGATIVE
RBC # BLD: 5.94 M/CU MM (ref 4.6–6.2)
SEGMENTED NEUTROPHILS ABSOLUTE COUNT: 8.8 K/CU MM
SEGMENTED NEUTROPHILS RELATIVE PERCENT: 89.7 % (ref 36–66)
SODIUM BLD-SCNC: 136 MMOL/L (ref 135–145)
TOTAL IMMATURE NEUTOROPHIL: 0.04 K/CU MM
TOTAL NUCLEATED RBC: 0 K/CU MM
TOTAL PROTEIN: 8.1 GM/DL (ref 6.4–8.2)
TOTAL RETICULOCYTE COUNT: 0.07 K/CU MM
TROPONIN T: <0.01 NG/ML
WBC # BLD: 9.8 K/CU MM (ref 4–10.5)

## 2020-03-03 PROCEDURE — 87040 BLOOD CULTURE FOR BACTERIA: CPT

## 2020-03-03 PROCEDURE — 1200000000 HC SEMI PRIVATE

## 2020-03-03 PROCEDURE — 74177 CT ABD & PELVIS W/CONTRAST: CPT

## 2020-03-03 PROCEDURE — 71275 CT ANGIOGRAPHY CHEST: CPT

## 2020-03-03 PROCEDURE — 80053 COMPREHEN METABOLIC PANEL: CPT

## 2020-03-03 PROCEDURE — 86850 RBC ANTIBODY SCREEN: CPT

## 2020-03-03 PROCEDURE — 83880 ASSAY OF NATRIURETIC PEPTIDE: CPT

## 2020-03-03 PROCEDURE — 85025 COMPLETE CBC W/AUTO DIFF WBC: CPT

## 2020-03-03 PROCEDURE — 96375 TX/PRO/DX INJ NEW DRUG ADDON: CPT

## 2020-03-03 PROCEDURE — 83605 ASSAY OF LACTIC ACID: CPT

## 2020-03-03 PROCEDURE — 83690 ASSAY OF LIPASE: CPT

## 2020-03-03 PROCEDURE — 86900 BLOOD TYPING SEROLOGIC ABO: CPT

## 2020-03-03 PROCEDURE — 82805 BLOOD GASES W/O2 SATURATION: CPT

## 2020-03-03 PROCEDURE — C9113 INJ PANTOPRAZOLE SODIUM, VIA: HCPCS | Performed by: EMERGENCY MEDICINE

## 2020-03-03 PROCEDURE — 6360000004 HC RX CONTRAST MEDICATION: Performed by: EMERGENCY MEDICINE

## 2020-03-03 PROCEDURE — 2580000003 HC RX 258: Performed by: EMERGENCY MEDICINE

## 2020-03-03 PROCEDURE — 84145 PROCALCITONIN (PCT): CPT

## 2020-03-03 PROCEDURE — 6360000002 HC RX W HCPCS: Performed by: EMERGENCY MEDICINE

## 2020-03-03 PROCEDURE — 71045 X-RAY EXAM CHEST 1 VIEW: CPT

## 2020-03-03 PROCEDURE — 96374 THER/PROPH/DIAG INJ IV PUSH: CPT

## 2020-03-03 PROCEDURE — 84484 ASSAY OF TROPONIN QUANT: CPT

## 2020-03-03 PROCEDURE — 93005 ELECTROCARDIOGRAM TRACING: CPT | Performed by: EMERGENCY MEDICINE

## 2020-03-03 PROCEDURE — 86901 BLOOD TYPING SEROLOGIC RH(D): CPT

## 2020-03-03 PROCEDURE — 99291 CRITICAL CARE FIRST HOUR: CPT

## 2020-03-03 PROCEDURE — 87804 INFLUENZA ASSAY W/OPTIC: CPT

## 2020-03-03 PROCEDURE — 96372 THER/PROPH/DIAG INJ SC/IM: CPT

## 2020-03-03 RX ORDER — FENTANYL CITRATE 50 UG/ML
50 INJECTION, SOLUTION INTRAMUSCULAR; INTRAVENOUS ONCE
Status: COMPLETED | OUTPATIENT
Start: 2020-03-03 | End: 2020-03-03

## 2020-03-03 RX ORDER — ONDANSETRON 2 MG/ML
4 INJECTION INTRAMUSCULAR; INTRAVENOUS EVERY 30 MIN PRN
Status: DISCONTINUED | OUTPATIENT
Start: 2020-03-03 | End: 2020-03-04

## 2020-03-03 RX ORDER — SODIUM CHLORIDE 0.9 % (FLUSH) 0.9 %
10 SYRINGE (ML) INJECTION 2 TIMES DAILY
Status: DISCONTINUED | OUTPATIENT
Start: 2020-03-03 | End: 2020-03-04

## 2020-03-03 RX ORDER — LEVOFLOXACIN 5 MG/ML
500 INJECTION, SOLUTION INTRAVENOUS ONCE
Status: COMPLETED | OUTPATIENT
Start: 2020-03-03 | End: 2020-03-04

## 2020-03-03 RX ORDER — 0.9 % SODIUM CHLORIDE 0.9 %
1000 INTRAVENOUS SOLUTION INTRAVENOUS ONCE
Status: COMPLETED | OUTPATIENT
Start: 2020-03-03 | End: 2020-03-04

## 2020-03-03 RX ORDER — METHYLPREDNISOLONE SODIUM SUCCINATE 125 MG/2ML
125 INJECTION, POWDER, LYOPHILIZED, FOR SOLUTION INTRAMUSCULAR; INTRAVENOUS ONCE
Status: COMPLETED | OUTPATIENT
Start: 2020-03-03 | End: 2020-03-03

## 2020-03-03 RX ORDER — PANTOPRAZOLE SODIUM 40 MG/10ML
40 INJECTION, POWDER, LYOPHILIZED, FOR SOLUTION INTRAVENOUS ONCE
Status: COMPLETED | OUTPATIENT
Start: 2020-03-03 | End: 2020-03-03

## 2020-03-03 RX ORDER — 0.9 % SODIUM CHLORIDE 0.9 %
1000 INTRAVENOUS SOLUTION INTRAVENOUS ONCE
Status: COMPLETED | OUTPATIENT
Start: 2020-03-03 | End: 2020-03-03

## 2020-03-03 RX ORDER — SODIUM CHLORIDE 9 MG/ML
INJECTION, SOLUTION INTRAVENOUS CONTINUOUS
Status: DISCONTINUED | OUTPATIENT
Start: 2020-03-03 | End: 2020-03-05

## 2020-03-03 RX ORDER — PROMETHAZINE HYDROCHLORIDE 25 MG/ML
25 INJECTION, SOLUTION INTRAMUSCULAR; INTRAVENOUS ONCE
Status: COMPLETED | OUTPATIENT
Start: 2020-03-03 | End: 2020-03-03

## 2020-03-03 RX ADMIN — METHYLPREDNISOLONE SODIUM SUCCINATE 125 MG: 125 INJECTION, POWDER, FOR SOLUTION INTRAMUSCULAR; INTRAVENOUS at 20:03

## 2020-03-03 RX ADMIN — FENTANYL CITRATE 50 MCG: 50 INJECTION INTRAMUSCULAR; INTRAVENOUS at 21:36

## 2020-03-03 RX ADMIN — ONDANSETRON 4 MG: 2 INJECTION INTRAMUSCULAR; INTRAVENOUS at 21:36

## 2020-03-03 RX ADMIN — IOPAMIDOL 90 ML: 755 INJECTION, SOLUTION INTRAVENOUS at 22:11

## 2020-03-03 RX ADMIN — PANTOPRAZOLE SODIUM 40 MG: 40 INJECTION, POWDER, FOR SOLUTION INTRAVENOUS at 20:03

## 2020-03-03 RX ADMIN — SODIUM CHLORIDE 1000 ML: 9 INJECTION, SOLUTION INTRAVENOUS at 20:02

## 2020-03-03 RX ADMIN — SODIUM CHLORIDE: 9 INJECTION, SOLUTION INTRAVENOUS at 20:03

## 2020-03-03 RX ADMIN — LEVOFLOXACIN 500 MG: 5 INJECTION, SOLUTION INTRAVENOUS at 23:15

## 2020-03-03 RX ADMIN — PROMETHAZINE HYDROCHLORIDE 25 MG: 25 INJECTION INTRAMUSCULAR; INTRAVENOUS at 20:03

## 2020-03-03 ASSESSMENT — PAIN SCALES - GENERAL: PAINLEVEL_OUTOF10: 10

## 2020-03-03 ASSESSMENT — PAIN DESCRIPTION - LOCATION: LOCATION: ABDOMEN

## 2020-03-04 LAB
ADENOVIRUS DETECTION BY PCR: NOT DETECTED
BACTERIA: NEGATIVE /HPF
BILIRUBIN URINE: NEGATIVE MG/DL
BLOOD, URINE: ABNORMAL
BORDETELLA PERTUSSIS PCR: NOT DETECTED
CHLAMYDOPHILA PNEUMONIA PCR: NOT DETECTED
CLARITY: CLEAR
CLOSTRIDIUM DIFFICILE, PCR: NORMAL
CLOSTRIDIUM DIFFICILE, PCR: NORMAL
COLOR: YELLOW
CORONAVIRUS 229E PCR: NOT DETECTED
CORONAVIRUS HKU1 PCR: NOT DETECTED
CORONAVIRUS NL63 PCR: NOT DETECTED
CORONAVIRUS OC43 PCR: NOT DETECTED
GLUCOSE, URINE: NEGATIVE MG/DL
HUMAN METAPNEUMOVIRUS PCR: NOT DETECTED
INFLUENZA A BY PCR: NOT DETECTED
INFLUENZA A H1 (2009) PCR: NOT DETECTED
INFLUENZA A H1 PANDEMIC PCR: NOT DETECTED
INFLUENZA A H3 PCR: NOT DETECTED
INFLUENZA B BY PCR: NOT DETECTED
KETONES, URINE: NEGATIVE MG/DL
LEUKOCYTE ESTERASE, URINE: NEGATIVE
MUCUS: ABNORMAL HPF
MYCOPLASMA PNEUMONIAE PCR: NOT DETECTED
NITRITE URINE, QUANTITATIVE: NEGATIVE
PARAINFLUENZA 1 PCR: NOT DETECTED
PARAINFLUENZA 2 PCR: NOT DETECTED
PARAINFLUENZA 3 PCR: NOT DETECTED
PARAINFLUENZA 4 PCR: NOT DETECTED
PH, URINE: 5 (ref 5–8)
PROCALCITONIN: 0.6
PROTEIN UA: NEGATIVE MG/DL
RBC URINE: 12 /HPF (ref 0–3)
RHINOVIRUS ENTEROVIRUS PCR: NOT DETECTED
RSV PCR: NOT DETECTED
SPECIFIC GRAVITY UA: 1.03 (ref 1–1.03)
TRICHOMONAS: ABNORMAL /HPF
UROBILINOGEN, URINE: NORMAL MG/DL (ref 0.2–1)
WBC UA: 1 /HPF (ref 0–2)

## 2020-03-04 PROCEDURE — 6360000002 HC RX W HCPCS: Performed by: INTERNAL MEDICINE

## 2020-03-04 PROCEDURE — 87798 DETECT AGENT NOS DNA AMP: CPT

## 2020-03-04 PROCEDURE — 2580000003 HC RX 258: Performed by: EMERGENCY MEDICINE

## 2020-03-04 PROCEDURE — 87324 CLOSTRIDIUM AG IA: CPT

## 2020-03-04 PROCEDURE — 1200000000 HC SEMI PRIVATE

## 2020-03-04 PROCEDURE — 82272 OCCULT BLD FECES 1-3 TESTS: CPT

## 2020-03-04 PROCEDURE — 94640 AIRWAY INHALATION TREATMENT: CPT

## 2020-03-04 PROCEDURE — 81001 URINALYSIS AUTO W/SCOPE: CPT

## 2020-03-04 PROCEDURE — 87899 AGENT NOS ASSAY W/OPTIC: CPT

## 2020-03-04 PROCEDURE — 94660 CPAP INITIATION&MGMT: CPT

## 2020-03-04 PROCEDURE — 87449 NOS EACH ORGANISM AG IA: CPT

## 2020-03-04 PROCEDURE — 87486 CHLMYD PNEUM DNA AMP PROBE: CPT

## 2020-03-04 PROCEDURE — 94761 N-INVAS EAR/PLS OXIMETRY MLT: CPT

## 2020-03-04 PROCEDURE — C9113 INJ PANTOPRAZOLE SODIUM, VIA: HCPCS | Performed by: INTERNAL MEDICINE

## 2020-03-04 PROCEDURE — 87581 M.PNEUMON DNA AMP PROBE: CPT

## 2020-03-04 PROCEDURE — 87633 RESP VIRUS 12-25 TARGETS: CPT

## 2020-03-04 PROCEDURE — 93010 ELECTROCARDIOGRAM REPORT: CPT | Performed by: INTERNAL MEDICINE

## 2020-03-04 PROCEDURE — 2580000003 HC RX 258: Performed by: INTERNAL MEDICINE

## 2020-03-04 PROCEDURE — 6370000000 HC RX 637 (ALT 250 FOR IP): Performed by: PHYSICIAN ASSISTANT

## 2020-03-04 PROCEDURE — 6370000000 HC RX 637 (ALT 250 FOR IP): Performed by: INTERNAL MEDICINE

## 2020-03-04 RX ORDER — PANTOPRAZOLE SODIUM 40 MG/1
40 TABLET, DELAYED RELEASE ORAL
Status: DISCONTINUED | OUTPATIENT
Start: 2020-03-04 | End: 2020-03-04

## 2020-03-04 RX ORDER — ONDANSETRON 2 MG/ML
4 INJECTION INTRAMUSCULAR; INTRAVENOUS EVERY 6 HOURS PRN
Status: DISCONTINUED | OUTPATIENT
Start: 2020-03-04 | End: 2020-03-06 | Stop reason: HOSPADM

## 2020-03-04 RX ORDER — SODIUM CHLORIDE 0.9 % (FLUSH) 0.9 %
10 SYRINGE (ML) INJECTION EVERY 12 HOURS SCHEDULED
Status: DISCONTINUED | OUTPATIENT
Start: 2020-03-04 | End: 2020-03-06 | Stop reason: HOSPADM

## 2020-03-04 RX ORDER — IPRATROPIUM BROMIDE AND ALBUTEROL SULFATE 2.5; .5 MG/3ML; MG/3ML
1 SOLUTION RESPIRATORY (INHALATION)
Status: DISCONTINUED | OUTPATIENT
Start: 2020-03-04 | End: 2020-03-06 | Stop reason: HOSPADM

## 2020-03-04 RX ORDER — POLYETHYLENE GLYCOL 3350 17 G/17G
17 POWDER, FOR SOLUTION ORAL DAILY PRN
Status: DISCONTINUED | OUTPATIENT
Start: 2020-03-04 | End: 2020-03-06 | Stop reason: HOSPADM

## 2020-03-04 RX ORDER — PANTOPRAZOLE SODIUM 40 MG/10ML
40 INJECTION, POWDER, LYOPHILIZED, FOR SOLUTION INTRAVENOUS DAILY
Status: DISCONTINUED | OUTPATIENT
Start: 2020-03-04 | End: 2020-03-06 | Stop reason: HOSPADM

## 2020-03-04 RX ORDER — LEVOFLOXACIN 5 MG/ML
750 INJECTION, SOLUTION INTRAVENOUS EVERY 24 HOURS
Status: DISCONTINUED | OUTPATIENT
Start: 2020-03-04 | End: 2020-03-06 | Stop reason: HOSPADM

## 2020-03-04 RX ORDER — DIPHENHYDRAMINE HCL 25 MG
50 TABLET ORAL ONCE
Status: COMPLETED | OUTPATIENT
Start: 2020-03-04 | End: 2020-03-04

## 2020-03-04 RX ORDER — ACETAMINOPHEN 650 MG/1
650 SUPPOSITORY RECTAL EVERY 6 HOURS PRN
Status: DISCONTINUED | OUTPATIENT
Start: 2020-03-04 | End: 2020-03-06 | Stop reason: HOSPADM

## 2020-03-04 RX ORDER — SODIUM CHLORIDE 0.9 % (FLUSH) 0.9 %
10 SYRINGE (ML) INJECTION PRN
Status: DISCONTINUED | OUTPATIENT
Start: 2020-03-04 | End: 2020-03-06 | Stop reason: HOSPADM

## 2020-03-04 RX ORDER — ACETAMINOPHEN 325 MG/1
650 TABLET ORAL EVERY 6 HOURS PRN
Status: DISCONTINUED | OUTPATIENT
Start: 2020-03-04 | End: 2020-03-06 | Stop reason: HOSPADM

## 2020-03-04 RX ORDER — PROMETHAZINE HYDROCHLORIDE 12.5 MG/1
12.5 TABLET ORAL EVERY 6 HOURS PRN
Status: DISCONTINUED | OUTPATIENT
Start: 2020-03-04 | End: 2020-03-06 | Stop reason: HOSPADM

## 2020-03-04 RX ADMIN — SODIUM CHLORIDE, PRESERVATIVE FREE 10 ML: 5 INJECTION INTRAVENOUS at 22:29

## 2020-03-04 RX ADMIN — PANTOPRAZOLE SODIUM 40 MG: 40 INJECTION, POWDER, FOR SOLUTION INTRAVENOUS at 17:04

## 2020-03-04 RX ADMIN — IPRATROPIUM BROMIDE AND ALBUTEROL SULFATE 1 AMPULE: .5; 3 SOLUTION RESPIRATORY (INHALATION) at 20:18

## 2020-03-04 RX ADMIN — SODIUM CHLORIDE 1000 ML: 9 INJECTION, SOLUTION INTRAVENOUS at 01:08

## 2020-03-04 RX ADMIN — LEVOFLOXACIN 750 MG: 5 INJECTION, SOLUTION INTRAVENOUS at 22:28

## 2020-03-04 RX ADMIN — DIPHENHYDRAMINE HYDROCHLORIDE 50 MG: 25 TABLET ORAL at 22:52

## 2020-03-04 RX ADMIN — SODIUM CHLORIDE: 9 INJECTION, SOLUTION INTRAVENOUS at 02:19

## 2020-03-04 RX ADMIN — IPRATROPIUM BROMIDE AND ALBUTEROL SULFATE 1 AMPULE: .5; 3 SOLUTION RESPIRATORY (INHALATION) at 08:18

## 2020-03-04 RX ADMIN — SODIUM CHLORIDE: 9 INJECTION, SOLUTION INTRAVENOUS at 12:34

## 2020-03-04 RX ADMIN — IPRATROPIUM BROMIDE AND ALBUTEROL SULFATE 1 AMPULE: .5; 3 SOLUTION RESPIRATORY (INHALATION) at 11:50

## 2020-03-04 RX ADMIN — SODIUM CHLORIDE: 9 INJECTION, SOLUTION INTRAVENOUS at 22:28

## 2020-03-04 RX ADMIN — IPRATROPIUM BROMIDE AND ALBUTEROL SULFATE 1 AMPULE: .5; 3 SOLUTION RESPIRATORY (INHALATION) at 16:14

## 2020-03-04 ASSESSMENT — PAIN SCALES - WONG BAKER

## 2020-03-04 ASSESSMENT — PAIN SCALES - GENERAL
PAINLEVEL_OUTOF10: 0
PAINLEVEL_OUTOF10: 0

## 2020-03-04 NOTE — PROGRESS NOTES
Hospitalist Progress Note      Name:  Anu Gan /Age/Sex: 1975  (40 y.o. male)   MRN & CSN:  [id number] & [id number] Admission Date/Time: 3/3/2020  7:25 PM   Location:  [address] [place] [address] PCP: Marko Castro MD         Hospital Day: 2    History of Present Illness:     Chief Complaint: Anu Gan is a 40 y.o.  male  who presents with vomiting and shortness of breath     The patient seen and examined at bedside. He says not feeling well and still having some nausea. Also has been having abdominal pain and diarrhea.   Ten point ROS reviewed negative, unless as noted above    Objective:   No intake or output data in the 24 hours ending 20 1309   Vitals:   Vitals:    20 1151   BP:    Pulse: 94   Resp: 18   Temp:    SpO2: 97%     Physical Exam:   General Appearance: alert and oriented to person, place and time, in no acute distress  Cardiovascular: Tachycardic, regular rhythm, normal S1 and S2  Pulmonary/Chest: Bilateral coarse breath sounds  Abdomen: soft, generalized tenderness, bowel sounds present in all quadrants  Extremities: no cyanosis, clubbing or edema, pulse   Skin: warm and dry, no rash or erythema  Head: normocephalic and atraumatic  Eyes: pupils equal, round, and reactive to light  Neck: supple and non-tender without mass, no thyromegaly   Musculoskeletal: normal range of motion, no joint swelling, deformity or tenderness  Neurological: alert, oriented, normal speech, no focal findings or movement disorder noted    Medications:   Medications:    pantoprazole  40 mg Oral QAM AC    sodium chloride flush  10 mL Intravenous 2 times per day    enoxaparin  40 mg Subcutaneous Daily    ipratropium-albuterol  1 ampule Inhalation Q4H WA    levofloxacin  750 mg Intravenous Q24H      Infusions:    sodium chloride 100 mL/hr at 20 1234     PRN Meds: sodium chloride flush, 10 mL, PRN  acetaminophen, 650 mg, Q6H PRN    Or  acetaminophen, 650 mg, Q6H PRN  polyethylene glycol, 17 radiation dose to as low as reasonably achievable. COMPARISON: None. HISTORY: ORDERING SYSTEM PROVIDED HISTORY: chest pain, ? PE TECHNOLOGIST PROVIDED HISTORY: PE protocol please. Reason for exam:->chest pain, ? PE Reason for Exam: chest pain Acuity: Acute Type of Exam: Initial; ORDERING SYSTEM PROVIDED HISTORY: abdominal pain. coffee ground emesis? TECHNOLOGIST PROVIDED HISTORY: IV contrast only. Thank you. Reason for exam:->abdominal pain. coffee ground emesis? Reason for exam:->IV contrast only. Thank you. Reason for Exam: abdominal pain. coffee ground emesis Acuity: Acute Type of Exam: Initial FINDINGS: CT CHEST: Pulmonary Arteries: Suboptimal evaluation of the subsegmental and more peripheral pulmonary arteries due to motion artifact. Given this, no obvious acute pulmonary thromboembolic disease. No pulmonary hypertension or right ventricular strain. Mediastinum: No evidence of mediastinal lymphadenopathy. The heart and pericardium demonstrate no acute abnormality. There is no acute abnormality of the thoracic aorta. Lungs/pleura: Respiratory motion. Expiratory imaging. Mild bilateral subsegmental atelectasis/scarring. Multifocal scattered bilateral ground-glass and nodular opacities. No pulmonary mass or lobar consolidation. No pleural effusion or pneumothorax. Remote granulomatous disease. No endoluminal masslike lesion. Soft Tissues/Bones: Multiple old right-sided rib fracture deformities with postsurgical changes of multiple rib fusions with hardware. Multilevel degenerative disc disease. CT ABDOMEN PELVIS: Liver: Normal. Gallbladder and Bile Ducts: Normal. Spleen: Normal. Adrenal Glands: Normal. Pancreas: Normal. Genitourinary: Round 1.2 cm probably benign cyst in the inferior left kidney. Otherwise, both kidneys are normal.  No hydronephrosis. The urinary bladder is decompressed. Bowel: Normal caliber bowel. Normal appendix. Minimal colonic diverticulosis without acute diverticulitis. Distended stomach without focal abnormal wall thickening. Vasculature: Normal. Bones and Soft Tissues: Degenerative osseous changes in the visualized spine and pelvis. Retroperitoneum/Mesentery: No intraperitoneal free air, ascites or fluid collection. No lymphadenopathy in the abdomen or pelvis. 1.  Suboptimal evaluation of the subsegmental and more peripheral pulmonary arteries due to motion artifact. Given this, no obvious acute pulmonary thromboembolic disease. No pulmonary hypertension. 2.  Multifocal scattered bilateral ground-glass and nodular opacities are most suggestive of an infectious/inflammatory process. No pulmonary mass or lobar consolidation. Follow-up chest CT in 2-3 months may be helpful to document improvement and evaluate for malignant potential. 3.  No acute abnormality in the abdomen or pelvis. No bowel obstruction. Normal appendix. Distended stomach without focal abnormal wall thickening. Xr Chest Portable    Result Date: 3/3/2020  EXAMINATION: ONE XRAY VIEW OF THE CHEST 3/3/2020 4:57 pm COMPARISON: 10/11/2012 HISTORY: ORDERING SYSTEM PROVIDED HISTORY: chest pain TECHNOLOGIST PROVIDED HISTORY: Reason for exam:->chest pain Reason for Exam: chest pain Acuity: Acute Type of Exam: Initial Additional signs and symptoms: na Relevant Medical/Surgical History: htn FINDINGS: In the interval, the patient has undergone ORIF of multiple right ribs. Associated pleural thickening and scarring is noted. The left lung is clear. No pneumothorax is seen. No free air. Cardial pericardial silhouette is unremarkable. No acute abnormality identified. Interval ORIF of multiple right-sided ribs.      Cta Chest W Contrast    Result Date: 3/3/2020  EXAMINATION: CTA OF THE CHEST; CT OF THE ABDOMEN AND PELVIS WITH CONTRAST 3/3/2020 8:48 pm; 3/3/2020 8:47 pm TECHNIQUE: CTA of the chest was performed after the administration of intravenous contrast.  Multiplanar reformatted images are provided for

## 2020-03-04 NOTE — ED PROVIDER NOTES
use: No     Alcohol/week: 0.0 standard drinks     Comment: quit in November 2013    Drug use: No    Sexual activity: Yes     Partners: Female   Lifestyle    Physical activity:     Days per week: Not on file     Minutes per session: Not on file    Stress: Not on file   Relationships    Social connections:     Talks on phone: Not on file     Gets together: Not on file     Attends Ep[employer] service: Not on file     Active member of club or organization: Not on file     Attends meetings of clubs or organizations: Not on file     Relationship status: Not on file    Intimate partner violence:     Fear of current or ex partner: Not on file     Emotionally abused: Not on file     Physically abused: Not on file     Forced sexual activity: Not on file   Other Topics Concern    Not on file   Social History Narrative    ** Merged History Encounter **          Current Facility-Administered Medications   Medication Dose Route Frequency Provider Last Rate Last Dose    0.9 % sodium chloride infusion   Intravenous Continuous Bk Galeas  mL/hr at 03/03/20 2003      sodium chloride flush 0.9 % injection 10 mL  10 mL Intravenous BID Bk Galeas MD        ondansetron [facility] injection 4 mg  4 mg Intravenous Q30 Min PRN Bk Galeas MD   4 mg at 03/03/20 2136    levofloxacin (LEVAQUIN) 500 MG/100ML infusion 500 mg  500 mg Intravenous Once Bk Galeas MD        0.9 % sodium chloride bolus  1,000 mL Intravenous Once Bk Galeas MD         Current Outpatient Medications   Medication Sig Dispense Refill    triamcinolone (KENALOG) 0.1 % cream Apply topically 2 times daily x 10 to 14 days.  1 Tube 0    esomeprazole (NEXIUM) 20 MG delayed release capsule Take 2 tablets Daily 60 capsule 3    ipratropium-albuterol (DUONEB) 0.5-2.5 (3) MG/3ML SOLN nebulizer solution Inhale 3 mLs into the lungs every 4 hours (Patient not taking: Reported on 10/15/2019) 360 mL 5    valACYclovir (VALTREX) 1 g tablet Take trismus. NECK: Supple. No meningismus. Trachea midline. HEART: Sinus tachycardia to 108. . Radial pulses 2+. LUNGS: Respirations unlabored. CTAB  ABDOMEN: Soft. Mild epigastric tenderness. Normal bowel sounds. . No guarding or rebound. EXTREMITIES: No acute deformities. SKIN: Warm and mildly diaphoretic. NEUROLOGICAL: No gross facial drooping. Moves all 4 extremities spontaneously. Strength is equal in all extremities. Sensation intact. PSYCHIATRIC: Normal mood.     I have reviewed and interpreted all of the currently available lab results from this visit (if applicable):  Results for orders placed or performed during the hospital encounter of 03/03/20   Rapid Flu Swab   Result Value Ref Range    Rapid Influenza A Ag NEGATIVE NEGATIVE    Rapid Influenza B Ag NEGATIVE NEGATIVE   CBC with Auto Diff   Result Value Ref Range    WBC 9.8 4.0 - 10.5 K/CU MM    RBC 5.94 4.6 - 6.2 M/CU MM    Hemoglobin 17.7 13.5 - 18.0 GM/DL    Hematocrit 54.8 (H) 42 - 52 %    MCV 92.3 78 - 100 FL    MCH 29.8 27 - 31 PG    MCHC 32.3 32.0 - 36.0 %    RDW 12.4 11.7 - 14.9 %    Platelets 404 713 - 441 K/CU MM    MPV 9.5 7.5 - 11.1 FL    Differential Type AUTOMATED DIFFERENTIAL     Segs Relative 89.7 (H) 36 - 66 %    Lymphocytes % 4.4 (L) 24 - 44 %    Monocytes % 5.0 (H) 0 - 4 %    Eosinophils % 0.3 0 - 3 %    Basophils % 0.2 0 - 1 %    Segs Absolute 8.8 K/CU MM    Lymphocytes Absolute 0.4 K/CU MM    Monocytes Absolute 0.5 K/CU MM    Eosinophils Absolute 0.0 K/CU MM    Basophils Absolute 0.0 K/CU MM    Nucleated RBC % 0.0 %    Total Nucleated RBC 0.0 K/CU MM    TRC 0.0713 K/CU MM    Total Immature Neutrophil 0.04 K/CU MM    Immature Neutrophil % 0.4 0 - 0.43 %   CMP   Result Value Ref Range    Sodium 136 135 - 145 MMOL/L    Potassium 4.2 3.5 - 5.1 MMOL/L    Chloride 101 99 - 110 mMol/L    CO2 22 21 - 32 MMOL/L    BUN 23 6 - 23 MG/DL    CREATININE 0.9 0.9 - 1.3 MG/DL    Glucose 147 (H) 70 - 99 MG/DL    Calcium 8.8 8.3 - 10.6 MG/DL    Alb 4.4 3.4 - 5.0 GM/DL    Total Protein 8.1 6.4 - 8.2 GM/DL    Total Bilirubin 0.7 0.0 - 1.0 MG/DL    ALT 28 10 - 40 U/L    AST 16 15 - 37 IU/L    Alkaline Phosphatase 100 40 - 128 IU/L    GFR Non-African American >60 >60 mL/min/1.73m2    GFR African American >60 >60 mL/min/1.73m2    Anion Gap 13 4 - 16   Lipase   Result Value Ref Range    Lipase 13 13 - 60 IU/L   Troponin   Result Value Ref Range    Troponin T <0.010 <0.01 NG/ML   Brain Natriuretic Peptide   Result Value Ref Range    Pro-BNP 8.39 <300 PG/ML   Lactic Acid, Plasma   Result Value Ref Range    Lactate (HH) 0.4 - 2.0 mMOL/L     2.7  LACT CALLED TO DR Kusum Mckinney AT 2040, [facility] MLS  RESULTS READ BACK     Blood Gas, Venous   Result Value Ref Range    pH, Marko 7.24 (L) 7.32 - 7.42    pCO2, Marko 54 (H) 38 - 52 mmHG    pO2, Marko 42 28 - 48 mmHG    Base Excess 5  MINUS   (H) 0 - 3.3    HCO3, Venous 23.1 19 - 25 MMOL/L    O2 Sat, Marko 76.0 (H) 50 - 70 %    Comment VBG    Lactic Acid, Plasma   Result Value Ref Range    Lactate 2.5 (HH) 0.4 - 2.0 mMOL/L   EKG 12 Lead   Result Value Ref Range    Ventricular Rate 106 BPM    Atrial Rate 106 BPM    P-R Interval 134 ms    QRS Duration 78 ms    Q-T Interval 316 ms    QTc Calculation (Bazett) 419 ms    P Axis 50 degrees    R Axis 55 degrees    T Axis 54 degrees    Diagnosis       Poor data quality, interpretation may be adversely affected  Sinus tachycardia  Otherwise normal ECG  No previous ECGs available     TYPE AND SCREEN   Result Value Ref Range    ABO/Rh O NEGATIVE     Antibody Screen NEGATIVE         Radiographs:  [] Radiologist's Wet Read Report Reviewed:      CTA CHEST W CONTRAST (Final result)   Result time 03/03/20 22:46:31   Procedure changed from CT Chest W Contrast   Final result by Mariela Cabello DO (03/03/20 22:46:31)                Impression:    1.  Suboptimal evaluation of the subsegmental and more peripheral pulmonary  arteries due to motion artifact.  Given this, no obvious acute pulmonary  thromboembolic disease.  No pulmonary hypertension. 2.  Multifocal scattered bilateral ground-glass and nodular opacities are  most suggestive of an infectious/inflammatory process.  No pulmonary mass or  lobar consolidation.  Follow-up chest CT in 2-3 months may be helpful to  document improvement and evaluate for malignant potential.    3.  No acute abnormality in the abdomen or pelvis.  No bowel obstruction. Normal appendix.  Distended stomach without focal abnormal wall thickening. Narrative:    EXAMINATION:  CTA OF THE CHEST; CT OF THE ABDOMEN AND PELVIS WITH CONTRAST 3/3/2020 8:48  pm; 3/3/2020 8:47 pm    TECHNIQUE:  CTA of the chest was performed after the administration of intravenous  contrast.  Multiplanar reformatted images are provided for review.  MIP  images are provided for review. Dose modulation, iterative reconstruction,  and/or weight based adjustment of the mA/kV was utilized to reduce the  radiation dose to as low as reasonably achievable.; CT of the abdomen and  pelvis was performed with the administration of intravenous contrast.  Multiplanar reformatted images are provided for review. Dose modulation,  iterative reconstruction, and/or weight based adjustment of the mA/kV was  utilized to reduce the radiation dose to as low as reasonably achievable. COMPARISON:  None. HISTORY:  ORDERING SYSTEM PROVIDED HISTORY: chest pain, ? PE  TECHNOLOGIST PROVIDED HISTORY:  PE protocol please. Reason for exam:->chest pain, ? PE  Reason for Exam: chest pain  Acuity: Acute  Type of Exam: Initial; ORDERING SYSTEM PROVIDED HISTORY: abdominal pain. coffee ground emesis? TECHNOLOGIST PROVIDED HISTORY:  IV contrast only. Thank you. Reason for exam:->abdominal pain. coffee ground emesis? Reason for exam:->IV contrast only. Thank you. Reason for Exam: abdominal pain.  coffee ground emesis  Acuity: Acute  Type of Exam: Initial    FINDINGS:  CT CHEST:    Pulmonary Arteries: Suboptimal evaluation of the subsegmental and more  peripheral pulmonary arteries due to motion artifact.  Given this, no obvious  acute pulmonary thromboembolic disease.  No pulmonary hypertension or right  ventricular strain. Mediastinum: No evidence of mediastinal lymphadenopathy.  The heart and  pericardium demonstrate no acute abnormality.  There is no acute abnormality  of the thoracic aorta. Lungs/pleura: Respiratory motion.  Expiratory imaging.  Mild bilateral  subsegmental atelectasis/scarring.  Multifocal scattered bilateral  ground-glass and nodular opacities.  No pulmonary mass or lobar  consolidation.  No pleural effusion or pneumothorax.  Remote granulomatous  disease.  No endoluminal masslike lesion. Soft Tissues/Bones: Multiple old right-sided rib fracture deformities with  postsurgical changes of multiple rib fusions with hardware.  Multilevel  degenerative disc disease. CT ABDOMEN PELVIS:    Liver: Normal.    Gallbladder and Bile Ducts: Normal.    Spleen: Normal.    Adrenal Glands: Normal.    Pancreas: Normal.    Genitourinary: Round 1.2 cm probably benign cyst in the inferior left kidney. Otherwise, both kidneys are normal.  No hydronephrosis.  The urinary bladder  is decompressed. Bowel: Normal caliber bowel.  Normal appendix.  Minimal colonic  diverticulosis without acute diverticulitis.  Distended stomach without focal  abnormal wall thickening. Vasculature: Normal.    Bones and Soft Tissues: Degenerative osseous changes in the visualized spine  and pelvis. Retroperitoneum/Mesentery: No intraperitoneal free air, ascites or fluid  collection.  No lymphadenopathy in the abdomen or pelvis.                    CT ABDOMEN PELVIS W IV CONTRAST (Final result)   Result time 03/03/20 22:46:31   Final result by Tavia Cabello DO (03/03/20 22:46:31)                Impression:    1.  Suboptimal evaluation of the subsegmental and more peripheral pulmonary  arteries due to motion artifact.  Given this, no obvious acute pulmonary  thromboembolic disease.  No pulmonary hypertension. 2.  Multifocal scattered bilateral ground-glass and nodular opacities are  most suggestive of an infectious/inflammatory process.  No pulmonary mass or  lobar consolidation.  Follow-up chest CT in 2-3 months may be helpful to  document improvement and evaluate for malignant potential.    3.  No acute abnormality in the abdomen or pelvis.  No bowel obstruction. Normal appendix.  Distended stomach without focal abnormal wall thickening. Narrative:    EXAMINATION:  CTA OF THE CHEST; CT OF THE ABDOMEN AND PELVIS WITH CONTRAST 3/3/2020 8:48  pm; 3/3/2020 8:47 pm    TECHNIQUE:  CTA of the chest was performed after the administration of intravenous  contrast.  Multiplanar reformatted images are provided for review.  MIP  images are provided for review. Dose modulation, iterative reconstruction,  and/or weight based adjustment of the mA/kV was utilized to reduce the  radiation dose to as low as reasonably achievable.; CT of the abdomen and  pelvis was performed with the administration of intravenous contrast.  Multiplanar reformatted images are provided for review. Dose modulation,  iterative reconstruction, and/or weight based adjustment of the mA/kV was  utilized to reduce the radiation dose to as low as reasonably achievable. COMPARISON:  None. HISTORY:  ORDERING SYSTEM PROVIDED HISTORY: chest pain, ? PE  TECHNOLOGIST PROVIDED HISTORY:  PE protocol please. Reason for exam:->chest pain, ? PE  Reason for Exam: chest pain  Acuity: Acute  Type of Exam: Initial; ORDERING SYSTEM PROVIDED HISTORY: abdominal pain. coffee ground emesis? TECHNOLOGIST PROVIDED HISTORY:  IV contrast only. Thank you. Reason for exam:->abdominal pain. coffee ground emesis? Reason for exam:->IV contrast only. Thank you. Reason for Exam: abdominal pain.  coffee ground emesis  Acuity: Acute  Type of Exam: Initial    FINDINGS:  CT CHEST:    Pulmonary Arteries: Suboptimal evaluation of the subsegmental and more  peripheral pulmonary arteries due to motion artifact.  Given this, no obvious  acute pulmonary thromboembolic disease.  No pulmonary hypertension or right  ventricular strain. Mediastinum: No evidence of mediastinal lymphadenopathy.  The heart and  pericardium demonstrate no acute abnormality.  There is no acute abnormality  of the thoracic aorta. Lungs/pleura: Respiratory motion.  Expiratory imaging.  Mild bilateral  subsegmental atelectasis/scarring.  Multifocal scattered bilateral  ground-glass and nodular opacities.  No pulmonary mass or lobar  consolidation.  No pleural effusion or pneumothorax.  Remote granulomatous  disease.  No endoluminal masslike lesion. Soft Tissues/Bones: Multiple old right-sided rib fracture deformities with  postsurgical changes of multiple rib fusions with hardware.  Multilevel  degenerative disc disease. CT ABDOMEN PELVIS:    Liver: Normal.    Gallbladder and Bile Ducts: Normal.    Spleen: Normal.    Adrenal Glands: Normal.    Pancreas: Normal.    Genitourinary: Round 1.2 cm probably benign cyst in the inferior left kidney. Otherwise, both kidneys are normal.  No hydronephrosis.  The urinary bladder  is decompressed. Bowel: Normal caliber bowel.  Normal appendix.  Minimal colonic  diverticulosis without acute diverticulitis.  Distended stomach without focal  abnormal wall thickening. Vasculature: Normal.    Bones and Soft Tissues: Degenerative osseous changes in the visualized spine  and pelvis. Retroperitoneum/Mesentery: No intraperitoneal free air, ascites or fluid  collection. No lymphadenopathy in the abdomen or pelvis.                    XR CHEST PORTABLE (Final result)   Result time 03/03/20 20:18:57   Final result by Beata Tyler MD (03/03/20 20:18:57)                Impression:    No acute abnormality identified. Interval ORIF of multiple right-sided ribs.             Narrative:

## 2020-03-04 NOTE — ED NOTES
Report called to [facility]     Aurora Lucero, [address] [place] [address]  03/04/20 8736

## 2020-03-04 NOTE — PROGRESS NOTES
Pt arrived on the floor very drowsy. He was given Fentanyl in the ER. Pt moved over to hospital bed without problem. Unable to stay awake, girlfriend was able to answer most questions.  Skin check revealed rash to right shin, healing wound to left shin and rash like bumps on abd that had reportedly been there for a month or so

## 2020-03-04 NOTE — H&P
History and Physical      Name:  Luzmaria Bauer /Age/Sex: 1975  (40 y.o. male)   MRN & CSN:  [id number] & [id number] Admission Date/Time: 3/3/2020  7:25 PM   Location:  [facility] PCP: Nettie Miller MD       Hospital Day: 1    Assessment and Plan:   Luzmaria Bauer is a 40 y.o.  male  who presents with fatigue vomiting and shortness of breath    -Bilateral pneumonia, community-acquired  -Chronic asthma currently not in acute exacerbation  -Morbid obesity  -Acute on chronic hypercapnic respiratory failure  -Obstructive sleep apnea noncompliant with CPAP    Plan  IV levofloxacin 750 mg once daily  DuoNebs  Nocturnal BiPAP              Diet No diet orders on file   DVT Prophylaxis [] Lovenox, []  Heparin, [] SCDs, [] Ambulation   GI Prophylaxis [] PPI,  [] H2 Blocker,  [] Carafate,  [] Diet/Tube Feeds   Code Status Prior   Disposition Patient requires continued admission due to    MDM [] Low, [] Moderate,[]  High  Patient's risk as above due to      History of Present Illness:     Chief Complaint: Fatigue shortness of breath and vomiting  Luzmaria Bauer is a 40 y.o.  male  who presents with fatigue shortness of breath and vomiting. Patient presented to emergency room via EMS. For the past 24 hours is been having shortness of breath. He felt generalized weakness and fatigue. Also he had nausea. He crawled to the bathroom and vomited. EMS found him on the floor in the bathroom. No cough, fever, chills, urinary symptoms or change bowel habits. He had upper abdominal pain 4 out of 10 in severity. Ten point ROS reviewed negative, unless as noted above    Objective:   No intake or output data in the 24 hours ending 20 2341   Vitals:   Vitals:    20 2245   BP: 116/74   Pulse: 92   Resp:    Temp:    SpO2: 92%     Physical Exam:   GEN Awake male, sitting upright in bed in no apparent distress. Appears given age. EYES Pupils are equally round.   No scleral erythema, discharge, or conjunctivitis. HENT Mucous membranes are moist. Oral pharynx without exudates, no evidence of thrush. NECK Supple, no apparent thyromegaly or masses. RESP Clear to auscultation, no wheezes, rales or rhonchi. Symmetric chest movement while on room air. CARDIO/VASC S1/S2 auscultated. Regular rate without appreciable murmurs, rubs, or gallops. No JVD or carotid bruits. Peripheral pulses equal bilaterally and palpable. No peripheral edema. GI Abdomen is soft without significant tenderness, masses, or guarding. Bowel sounds are normoactive. Rectal exam deferred.  No costovertebral angle tenderness. Baig catheter is not present. HEME/LYMPH No palpable cervical lymphadenopathy and no hepatosplenomegaly. No petechiae or ecchymoses. MSK No gross joint deformities. SKIN Normal coloration, warm, dry. NEURO Cranial nerves appear grossly intact, normal speech, no lateralizing weakness. PSYCH Awake, alert, oriented x 4. Affect appropriate. Past Medical History:      Past Medical History:   Diagnosis Date    Asthma     Esophageal reflux     HTN (hypertension)     Hypertension     Hypertension     Laceration of right kidney 1/23/2017    Low back pain     Mild intermittent asthma without complication      PSHX:  has a past surgical history that includes Tonsillectomy. Allergies: Allergies   Allergen Reactions    Penicillins Rash, Anaphylaxis and Hives     Reaction unknown per family    Poultry Meal Anaphylaxis       FAM HX: family history includes Heart Disease in his mother; High Blood Pressure in his mother.   Soc HX:   Social History     Socioeconomic History    Marital status:      Spouse name: None    Number of children: None    Years of education: None    Highest education level: None   Occupational History    None   Social Needs    Financial resource strain: None    Food insecurity:     Worry: None     Inability: None    Transportation needs:     Medical: None Non-medical: None   Tobacco Use    Smoking status: Never Smoker    Smokeless tobacco: Current User     Types: Chew   Substance and Sexual Activity    Alcohol use: No     Alcohol/week: 0.0 standard drinks     Comment: quit in November 2013    Drug use: No    Sexual activity: Yes     Partners: Female   Lifestyle    Physical activity:     Days per week: None     Minutes per session: None    Stress: None   Relationships    Social connections:     Talks on phone: None     Gets together: None     Attends Rastafari service: None     Active member of club or organization: None     Attends meetings of clubs or organizations: None     Relationship status: None    Intimate partner violence:     Fear of current or ex partner: None     Emotionally abused: None     Physically abused: None     Forced sexual activity: None   Other Topics Concern    None   Social History Narrative    ** Merged History Encounter **            Medications:   Medications:    sodium chloride flush  10 mL Intravenous BID    levofloxacin  500 mg Intravenous Once    sodium chloride  1,000 mL Intravenous Once      Infusions:    sodium chloride 100 mL/hr at 03/03/20 2003     PRN Meds: ondansetron, 4 mg, Q30 Min PRN      Prior to Admission medications    Medication Sig Start Date End Date Taking? Authorizing Provider   triamcinolone (KENALOG) 0.1 % cream Apply topically 2 times daily x 10 to 14 days.  10/15/19   TOMY Jaquez - CNP   esomeprazole (NEXIUM) 20 MG delayed release capsule Take 2 tablets Daily 12/4/18   Kavya Benites PA-C   ipratropium-albuterol (DUONEB) 0.5-2.5 (3) MG/3ML SOLN nebulizer solution Inhale 3 mLs into the lungs every 4 hours  Patient not taking: Reported on 10/15/2019 12/4/18   Kavya Benites PA-C   valACYclovir (VALTREX) 1 g tablet Take 1 tablet by mouth 3 times daily  Patient not taking: Reported on 10/15/2019 12/4/18   Kavya Benites PA-C   albuterol sulfate HFA (VENTOLIN HFA) 108 (90 Base) MCG/ACT inhaler Inhale 2 puffs into the lungs every 6 hours as needed for Wheezing 12/4/18   Velma Curtis PA-C   Fluticasone Furoate-Vilanterol (BREO ELLIPTA) 200-25 MCG/INH AEPB Take 1 puff by mouth daily  Patient not taking: Reported on 10/15/2019 12/4/18   Velma Curtis PA-C   DULoxetine (CYMBALTA) 60 MG extended release capsule Take 1 capsule by mouth daily  Patient not taking: Reported on 10/15/2019 12/4/18   Velma Curtis PA-C   Respiratory Therapy Supplies (NEBULIZER/TUBING/MOUTHPIECE) KIT 1 kit by Does not apply route daily as needed (wheezing)  Patient not taking: Reported on 10/15/2019 4/8/18   TOMY Lucia - CNP   Nebulizers (COMP AIR COMPRESSOR NEBULIZER) MISC 1 kit by Does not apply route once for 1 dose 4/8/18 4/8/18  Lloyd Stark APRN - CNP   lisinopril (PRINIVIL;ZESTRIL) 20 MG tablet Take 1 tablet by mouth daily  Patient not taking: Reported on 10/15/2019 3/7/18   Tom Gu MD   acetaminophen (TYLENOL) 500 MG tablet Take 500 mg by mouth every 6 hours as needed for Pain    Historical MD Po         Electronically signed by Gio Lockhart MD on 3/3/2020 at 11:41 PM

## 2020-03-04 NOTE — CARE COORDINATION
Patient discharged home on 3/6/2020    CN met with patient on 3/4, education provided, patient states I didn't have a cough really just the body aches and I kept shitting myself and was dizzy    Initial Patient Assessment Note    What symptoms brought you to the hospital? Patient came in with fatigue, vomiting and sob. CT showed possible pneumonia    CXR:No acute abnormality identified    Where do you live:       [x] Home with spouse       [] Independent Living     [] Assisted Living                       [] [address]   [] Homeless/Homeless Shelter    [] Group Home      Primary Care Physician:  [x] Dr Forbes                         [] None    [] PA/NP              [] [facility] Phys[facility]              Pulmonary Physician:    Consulted:  [] Meseret Bernal     [] Yes   [] Nathalie     [] No  [] Bre              [] Claudine:    [] Other:      Pharmacy:      Meds to Beds:  [] [employer]      [] Yes   [] [facility]      [x] No   [] [employer]   [] [employer]      [] [employer]    [] [employer]   [] [employer] (acquired by [employer] in 2014)   [] [employer]   [] [facility]   [] [employer]   [] [employer]   [x] [employer]   [] [employer]   [] Other:    Home Care:         [] Yes, Name:       [x] No            SNF:  [] Yes, Name:   [x] No           Do you have:            DME Company:  [] Home O2     [] CM DME    [] BIPAP/CPAP    [] [employer]   [] Nebulizer     [] [place]                  [x] None of the above    [] Other:       Home COPD Medications:  Inhalers: Albuterol  Nebulizers: Other:    Are you out of any medications? [] Yes   [x] No   Can you pay for your meds? [] Yes   [x] No  Do you have transportation? [x] Yes   [] No          What time do you prefer your appointments:  [] AM   [x] PM  [] Anytime          Pneumonia education given to patient/caregiver using the Pneumonia Stoplight and About Pneumonia pamphlet with teach back. Questions answered.       Call Physician,Nurse Navigator or Home Health Nurse if have signs and symptoms:  Shortness of breath,thicker/colored phlegm,  using rescue inhalers or nebulizer more than normal,  fatigued, unable to do usual activities,   fever,chills & sweating,   sleeping poorly,  dry cough , headache       Symptoms may indicate you may need to adjust your medications  Limit your activities  Make sure you are taking medications correctly  Force fluids  Take medications for your fever      Call Physician immediately or 911 if have symptoms:  Unrelieved shortness of breath  Shortness of breath at rest  Fever over 101  Confusion  Sharp/stabbing pain in chest worse with deep breaths and cough  Severe to increased coughing  Unusual fatigue  Unable to stay awake  Bluish color to lips or fingernails    Verified if patient had received flu or Pneumonia vaccines previously. Teachback received from patient/family. CN contact information given.      PNEUMONIA CHECKLIST    Was the PNA Order set used      Yes  Pneumonia Stoplight Education    Yes/No  Antibiotic given within 24 hours    Yes 3/3 2315  Blood cultures drawn before antibiotic given  Not ordered  O2 Saturation on admission    94% RA      Consults:             Smoking Cessation        NA  Pulmonary         No  Med Assist Consult        No  Home Health Care Consult       No  Palliative Care Consult       No  Respiratory Consult        Yes  (including bedside instructions on nebulizers, inhalers, and assessment of oxygen and equipment needs at home)      Discharge:             Pneumococcal Vaccine given before discharge     NA 2018  Flu Vaccine given before discharge      NA deferred  Inhalers          No  Spacer          NA  Steroids          No  Follow-up appointment scheduled within 5-7 days    Yes  Cardio/Pulmonary Wellness program consult     NA  Home Oxygen         No  Neblizer, bipap, cpap at home       No  Home air quality assessment [facility]

## 2020-03-05 ENCOUNTER — TELEPHONE (OUTPATIENT)
Dept: FAMILY MEDICINE CLINIC | Age: 45
End: 2020-03-05

## 2020-03-05 LAB
HEMOCCULT STL QL: POSITIVE
LEGIONELLA URINARY AG: NEGATIVE
STREP PNEUMONIAE ANTIGEN: NORMAL

## 2020-03-05 PROCEDURE — 6360000002 HC RX W HCPCS: Performed by: INTERNAL MEDICINE

## 2020-03-05 PROCEDURE — 6370000000 HC RX 637 (ALT 250 FOR IP): Performed by: NURSE PRACTITIONER

## 2020-03-05 PROCEDURE — 2580000003 HC RX 258: Performed by: INTERNAL MEDICINE

## 2020-03-05 PROCEDURE — C9113 INJ PANTOPRAZOLE SODIUM, VIA: HCPCS | Performed by: INTERNAL MEDICINE

## 2020-03-05 PROCEDURE — 80048 BASIC METABOLIC PNL TOTAL CA: CPT

## 2020-03-05 PROCEDURE — 85025 COMPLETE CBC W/AUTO DIFF WBC: CPT

## 2020-03-05 PROCEDURE — 1200000000 HC SEMI PRIVATE

## 2020-03-05 RX ORDER — FAMOTIDINE 20 MG/1
20 TABLET, FILM COATED ORAL NIGHTLY
Status: DISCONTINUED | OUTPATIENT
Start: 2020-03-05 | End: 2020-03-06 | Stop reason: HOSPADM

## 2020-03-05 RX ORDER — SUCRALFATE 1 G/1
1 TABLET ORAL
Status: DISCONTINUED | OUTPATIENT
Start: 2020-03-05 | End: 2020-03-06 | Stop reason: HOSPADM

## 2020-03-05 RX ADMIN — FAMOTIDINE 20 MG: 20 TABLET, FILM COATED ORAL at 22:39

## 2020-03-05 RX ADMIN — SODIUM CHLORIDE, PRESERVATIVE FREE 10 ML: 5 INJECTION INTRAVENOUS at 09:17

## 2020-03-05 RX ADMIN — LEVOFLOXACIN 750 MG: 5 INJECTION, SOLUTION INTRAVENOUS at 22:39

## 2020-03-05 RX ADMIN — PANTOPRAZOLE SODIUM 40 MG: 40 INJECTION, POWDER, FOR SOLUTION INTRAVENOUS at 09:13

## 2020-03-05 RX ADMIN — SUCRALFATE 1 G: 1 TABLET ORAL at 22:39

## 2020-03-05 RX ADMIN — ENOXAPARIN SODIUM 40 MG: 40 INJECTION SUBCUTANEOUS at 09:09

## 2020-03-05 RX ADMIN — SUCRALFATE 1 G: 1 TABLET ORAL at 16:04

## 2020-03-05 RX ADMIN — SODIUM CHLORIDE, PRESERVATIVE FREE 10 ML: 5 INJECTION INTRAVENOUS at 22:40

## 2020-03-05 ASSESSMENT — PAIN SCALES - WONG BAKER
WONGBAKER_NUMERICALRESPONSE: 0
WONGBAKER_NUMERICALRESPONSE: 0

## 2020-03-05 ASSESSMENT — PAIN SCALES - GENERAL: PAINLEVEL_OUTOF10: 0

## 2020-03-05 NOTE — PROGRESS NOTES
Dr. Brodie Goodell (GI Physician) just came in to see pt. He states that pt is ok and he is going to sign off from GI standpoint. Dr. Jessica Sparrow paged about this.

## 2020-03-05 NOTE — DISCHARGE INSTR - OTHER ORDERS
Follow-up With  Details  Why  Contact Info   Marko Castro MD  Go on 3/12/2020  Follow up @ 1:00 PM please arrive at 12:45 PM  [address] [place]  [phone]

## 2020-03-05 NOTE — PROGRESS NOTES
Intramuscular Once      Infusions:     PRN Meds: sodium chloride flush, 10 mL, PRN  acetaminophen, 650 mg, Q6H PRN    Or  acetaminophen, 650 mg, Q6H PRN  polyethylene glycol, 17 g, Daily PRN  promethazine, 12.5 mg, Q6H PRN    Or  ondansetron, 4 mg, Q6H PRN            Pertinent New Labs & Imaging Studies     CBC with Differential:    Lab Results   Component Value Date    WBC 9.8 03/03/2020    RBC 5.94 03/03/2020    HGB 17.7 03/03/2020    HCT 54.8 03/03/2020     03/03/2020    MCV 92.3 03/03/2020    MCH 29.8 03/03/2020    MCHC 32.3 03/03/2020    RDW 12.4 03/03/2020    SEGSPCT 89.7 03/03/2020    LYMPHOPCT 4.4 03/03/2020    MONOPCT 5.0 03/03/2020    EOSPCT 2.3 11/14/2011    BASOPCT 0.2 03/03/2020    MONOSABS 0.5 03/03/2020    LYMPHSABS 0.4 03/03/2020    EOSABS 0.0 03/03/2020    BASOSABS 0.0 03/03/2020    DIFFTYPE AUTOMATED DIFFERENTIAL 03/03/2020     CMP:    Lab Results   Component Value Date     03/03/2020    K 4.2 03/03/2020     03/03/2020    CO2 22 03/03/2020    BUN 23 03/03/2020    CREATININE 0.9 03/03/2020    GFRAA >60 03/03/2020    AGRATIO 1.6 11/17/2017    LABGLOM >60 03/03/2020    GLUCOSE 147 03/03/2020    PROT 8.1 03/03/2020    PROT 8.1 10/11/2012    LABALBU 4.4 03/03/2020    CALCIUM 8.8 03/03/2020    BILITOT 0.7 03/03/2020    ALKPHOS 100 03/03/2020    AST 16 03/03/2020    ALT 28 03/03/2020     Ct Abdomen Pelvis W Iv Contrast    Result Date: 3/3/2020  EXAMINATION: CTA OF THE CHEST; CT OF THE ABDOMEN AND PELVIS WITH CONTRAST 3/3/2020 8:48 pm; 3/3/2020 8:47 pm TECHNIQUE: CTA of the chest was performed after the administration of intravenous contrast.  Multiplanar reformatted images are provided for review. MIP images are provided for review.  Dose modulation, iterative reconstruction, and/or weight based adjustment of the mA/kV was utilized to reduce the radiation dose to as low as reasonably achievable.; CT of the abdomen and pelvis was performed with the administration of intravenous contrast. Multiplanar reformatted images are provided for review. Dose modulation, iterative reconstruction, and/or weight based adjustment of the mA/kV was utilized to reduce the radiation dose to as low as reasonably achievable. COMPARISON: None. HISTORY: ORDERING SYSTEM PROVIDED HISTORY: chest pain, ? PE TECHNOLOGIST PROVIDED HISTORY: PE protocol please. Reason for exam:->chest pain, ? PE Reason for Exam: chest pain Acuity: Acute Type of Exam: Initial; ORDERING SYSTEM PROVIDED HISTORY: abdominal pain. coffee ground emesis? TECHNOLOGIST PROVIDED HISTORY: IV contrast only. Thank you. Reason for exam:->abdominal pain. coffee ground emesis? Reason for exam:->IV contrast only. Thank you. Reason for Exam: abdominal pain. coffee ground emesis Acuity: Acute Type of Exam: Initial FINDINGS: CT CHEST: Pulmonary Arteries: Suboptimal evaluation of the subsegmental and more peripheral pulmonary arteries due to motion artifact. Given this, no obvious acute pulmonary thromboembolic disease. No pulmonary hypertension or right ventricular strain. Mediastinum: No evidence of mediastinal lymphadenopathy. The heart and pericardium demonstrate no acute abnormality. There is no acute abnormality of the thoracic aorta. Lungs/pleura: Respiratory motion. Expiratory imaging. Mild bilateral subsegmental atelectasis/scarring. Multifocal scattered bilateral ground-glass and nodular opacities. No pulmonary mass or lobar consolidation. No pleural effusion or pneumothorax. Remote granulomatous disease. No endoluminal masslike lesion. Soft Tissues/Bones: Multiple old right-sided rib fracture deformities with postsurgical changes of multiple rib fusions with hardware. Multilevel degenerative disc disease. CT ABDOMEN PELVIS: Liver: Normal. Gallbladder and Bile Ducts: Normal. Spleen: Normal. Adrenal Glands: Normal. Pancreas: Normal. Genitourinary: Round 1.2 cm probably benign cyst in the inferior left kidney.  Otherwise, both kidneys are normal. pm; 3/3/2020 8:47 pm TECHNIQUE: CTA of the chest was performed after the administration of intravenous contrast.  Multiplanar reformatted images are provided for review. MIP images are provided for review. Dose modulation, iterative reconstruction, and/or weight based adjustment of the mA/kV was utilized to reduce the radiation dose to as low as reasonably achievable.; CT of the abdomen and pelvis was performed with the administration of intravenous contrast. Multiplanar reformatted images are provided for review. Dose modulation, iterative reconstruction, and/or weight based adjustment of the mA/kV was utilized to reduce the radiation dose to as low as reasonably achievable. COMPARISON: None. HISTORY: ORDERING SYSTEM PROVIDED HISTORY: chest pain, ? PE TECHNOLOGIST PROVIDED HISTORY: PE protocol please. Reason for exam:->chest pain, ? PE Reason for Exam: chest pain Acuity: Acute Type of Exam: Initial; ORDERING SYSTEM PROVIDED HISTORY: abdominal pain. coffee ground emesis? TECHNOLOGIST PROVIDED HISTORY: IV contrast only. Thank you. Reason for exam:->abdominal pain. coffee ground emesis? Reason for exam:->IV contrast only. Thank you. Reason for Exam: abdominal pain. coffee ground emesis Acuity: Acute Type of Exam: Initial FINDINGS: CT CHEST: Pulmonary Arteries: Suboptimal evaluation of the subsegmental and more peripheral pulmonary arteries due to motion artifact. Given this, no obvious acute pulmonary thromboembolic disease. No pulmonary hypertension or right ventricular strain. Mediastinum: No evidence of mediastinal lymphadenopathy. The heart and pericardium demonstrate no acute abnormality. There is no acute abnormality of the thoracic aorta. Lungs/pleura: Respiratory motion. Expiratory imaging. Mild bilateral subsegmental atelectasis/scarring. Multifocal scattered bilateral ground-glass and nodular opacities. No pulmonary mass or lobar consolidation. No pleural effusion or pneumothorax.   Remote q4 while awake  Tylenol prn for fever  -Antibiotics: Continue Levofloxocin  -Pro calcitonin is slightly elevated    Possible GI bleed  Consulted GI  Hemoccult positive  Blood stable so far we will repeat CBC tomorrow    Diarrhea  Obtained C Diff, negative  Continue IV fluids  Continue monitoring Lactic acid levels    NAGMA  Likely from GI losses  Continue IV fluids    Mild intermittent Asthma, Stable  Continue breathing treatments    Hypertension  Hold off home antihypertensives      Depression  Will resume cymbalta    Diet DIET CARDIAC;   DVT Prophylaxis [x] Lovenox, []  Heparin, [] SCDs, [] Ambulation   GI Prophylaxis [x] PPI,  [] H2 Blocker,  [] Carafate,  [] Diet/Tube Feeds   Code Status Full Code   Disposition Patient requires continued admission due to Pneumonia   MDM [] Low, [x] Moderate,[]  High     Electronically signed by Madeline Barajas MD on 3/5/2020 at 3:54 PM

## 2020-03-05 NOTE — CONSULTS
[facility]  Gastroenterology Consultation    3/5/2020  12:22 PM    Patient:    Glenna Jennings  : 1975   40 y. o. MRN: [id number]  Admitted: 3/3/2020  7:25 PM ATT: Katie Bautista MD   [id number]  AdmitDx: Pneumonia [J18.9]  Pneumonia due to organism [J18.9]  Hypoxia [R09.02]  Elevated lactic acid level [R79.89]  Intractable vomiting with nausea, unspecified vomiting type [R11.2]  PCP: Yvonne Hackett MD    Reason for Consult: +occult stool    Requesting Physician:  Katie Bautista MD      History Obtained From:  Patient and review of all records    HISTORY OF PRESENT ILLNESS:                The patient is a 40 y.o. male with significant past medical history as below who presents with above mentioned causes in reason for consult. Presented to [facility] with N/V. Patient covered up with the lights out in bed. Asked for the lights not to be turned on. Patient verbalized that he is very upset that he is not able to be \"left alone to sleep\". Per nurse he is having loose stool. Patient refused labs per nurse. Patient was difficult to interview. Family member was sleeping on the couch. Denies brbpr and melena. Denies N/V today. Admits to having dysphagia. When asked frequency he states \"I don't know just when I eat\". Doesn't take any medication for GERD at home.      Denies Abdominal pain  Last BM \"doesn't know\"    History of EGD \"doesn't know when\"  Denies History of colonoscopy     NonSmoker  Denies Alcohol intake    Past Medical History:        Diagnosis Date    Asthma     Esophageal reflux     HTN (hypertension)     Hypertension     Hypertension     Laceration of right kidney 2017    Low back pain     Mild intermittent asthma without complication     Pneumonia 3/3/2020       Past Surgical History:        Procedure Laterality Date    TONSILLECTOMY           Current Medications:    Medications    Scheduled Medications:    sodium Please decrease the prednisone dose to   50 mg daily until 12/7/18 (included)  40 mg daily 12/8 to 12/14 (included)  30 mg daily 12/15 to 12/28 (included)  20 mg daily thereafter  I will plan to see you back in mid January  Please call me or Sandra Peralta if you feel the swelling is getting worse again.  Please pay close attention to diet and exercise to limit further weight gain.   It would be great if you could loose 1-2 lbs a week.  After we get you on a stable/low dose of prednisone, we will introduce another medication probably.   chloride flush  10 mL Intravenous 2 times per day    enoxaparin  40 mg Subcutaneous Daily    ipratropium-albuterol  1 ampule Inhalation Q4H WA    levofloxacin  750 mg Intravenous Q24H    pantoprazole  40 mg Intravenous Daily    influenza virus vaccine  0.5 mL Intramuscular Once     PRN Medications: sodium chloride flush, acetaminophen **OR** acetaminophen, polyethylene glycol, promethazine **OR** ondansetron      Allergies:  Penicillins and Poultry meal    Social History:   TOBACCO:   reports that he has never smoked. His smokeless tobacco use includes chew. ETOH:   reports no history of alcohol use. Family History:       Problem Relation Age of Onset    High Blood Pressure Mother     Heart Disease Mother        No family history of colon cancer, Crohn's disease, or ulcerative colitis. REVIEW OF SYSTEMS:    The positive ROS will be identified in bold, otherwise ROS are negative     CONSTITUTIONAL:  Neg   Recent weight changes, fatigue, fever, chills or night sweats  EYES:  Neg  Blurriness, earing, itching or acute change in vision  EARS:  Neg  hearing loss, tinnitus, vertigo, discharge or earache. NOSE:  Neg  Rhinorrhea, sneezing, itching, allergy or epistaxis  MOUTH/THROAT:  Neg  bleeding gums, hoarseness or sore throat. RESPIRATORY:   Neg SOB, wheeze, cough, sputum, hemoptysis or bronchitis  CARDIOVASCULAR:  Neg chest pain, palpitations, dyspnea on exertion, orthopnea, paroxysmal nocturnal dyspnea or edema  GASTROINTESTINAL:  SEE HPI  GENITOURINARY:  Neg  Urinary frequency, hesitancy, urgency, polyuria, dysuria, hematuria, nocturia, or incontinence. HEMATOLOGIC/LYMPHATIC:  Neg  Anemia, bleeding tendency  MUSCULOSKELETAL:    New myalgias, bone pain, joint pain, swelling or stiffness and has had change in gait.   NEUROLOGICAL:  Neg  Loss of Consciousness, memory loss, forgetfulness, periods of confusion, difficulty concentrating, seizures, decline in intellect, nervousness, insomina, aphasia or dysarthria. SKIN:  Neg  skin or hair changes, and has no itching, rashes, or sores. PSYCHIATRIC:  Neg depression, personality changes, anxiety. ENDOCRINE:  Neg polydipsia, polyuria, abnormal weight changes, heat /cold intolerance. ALL/IMM:  Neg reactions to drugs other than listed    PHYSICAL EXAM:      Vitals:    /71   Pulse 77   Temp 97.6 °F (36.4 °C) (Oral)   Resp 18   Ht 5' 11\" (1.803 m)   Wt 256 lb 12.8 oz (116.5 kg)   SpO2 98%   BMI 35.82 kg/m²     General Appearance:    Alert, uncooperative, no distress, appears stated age   [de-identified]:    Normocephalic, atraumatic, Conjunctiva clear   Neck:   Supple, symmetrical, trachea midline   Lungs:     Clear to auscultation bilaterally, respirations unlabored   Chest Wall:    No tenderness or deformity    Heart:    Regular rate and rhythm, S1 and S2 normal   Abdomen:     Soft, non-tender, bowel sounds active all four quadrants,     no masses, no organomegaly, no ascites    Rectal:    Deferred   Extremities:   Extremities normal, atraumatic, no cyanosis or edema   Pulses:   2+ and symmetric all extremities   Skin:   Skin color, texture, turgor normal, no rashes or lesions       Neurologic:   No focal deficits, moving all four extremities            DATA:    ABGs: No results found for: PHART, PO2ART, EIM4WLE  CBC:   Recent Labs     03/03/20 1945   WBC 9.8   HGB 17.7        BMP:    Recent Labs     03/03/20 1945      K 4.2      CO2 22   BUN 23   CREATININE 0.9   GLUCOSE 147*     Magnesium: No results found for: MG  Hepatic:   Recent Labs     03/03/20 1945   AST 16   ALT 28   BILITOT 0.7   ALKPHOS 100     Recent Labs     03/03/20 1945   LIPASE 13     No results for input(s): PROTIME, INR in the last 72 hours. No results for input(s): PTT in the last 72 hours. Lipids: No results for input(s): CHOL, HDL in the last 72 hours. Invalid input(s): LDLCALCU  INR: No results for input(s): INR in the last 72 hours.   TSH:   Lab Results independently of the nurse practitioner. The plan was developed mutually at the time of the visit with the patient. Marlin Garay CNP and myself have spoken with patient, nursing staff and provided written and verbal instructions .     The above note has been reviewed and I agree with the Assessment,  Diagnosis, and Treatment plan as suggested by Marlin Garay CNP      [address] [facility]

## 2020-03-06 VITALS
HEIGHT: 71 IN | TEMPERATURE: 98.6 F | WEIGHT: 256 LBS | HEART RATE: 74 BPM | DIASTOLIC BLOOD PRESSURE: 66 MMHG | RESPIRATION RATE: 18 BRPM | OXYGEN SATURATION: 97 % | SYSTOLIC BLOOD PRESSURE: 121 MMHG | BODY MASS INDEX: 35.84 KG/M2

## 2020-03-06 LAB
ANION GAP SERPL CALCULATED.3IONS-SCNC: 9 MMOL/L (ref 4–16)
BASOPHILS ABSOLUTE: 0 K/CU MM
BASOPHILS RELATIVE PERCENT: 0.4 % (ref 0–1)
BUN BLDV-MCNC: 10 MG/DL (ref 6–23)
CALCIUM SERPL-MCNC: 8.7 MG/DL (ref 8.3–10.6)
CHLORIDE BLD-SCNC: 103 MMOL/L (ref 99–110)
CO2: 27 MMOL/L (ref 21–32)
CREAT SERPL-MCNC: 0.8 MG/DL (ref 0.9–1.3)
DIFFERENTIAL TYPE: ABNORMAL
EOSINOPHILS ABSOLUTE: 0.5 K/CU MM
EOSINOPHILS RELATIVE PERCENT: 5.6 % (ref 0–3)
GFR AFRICAN AMERICAN: >60 ML/MIN/1.73M2
GFR NON-AFRICAN AMERICAN: >60 ML/MIN/1.73M2
GLUCOSE BLD-MCNC: 89 MG/DL (ref 70–99)
HCT VFR BLD CALC: 44.6 % (ref 42–52)
HEMOGLOBIN: 14.3 GM/DL (ref 13.5–18)
IMMATURE NEUTROPHIL %: 0.4 % (ref 0–0.43)
LYMPHOCYTES ABSOLUTE: 3.2 K/CU MM
LYMPHOCYTES RELATIVE PERCENT: 32.6 % (ref 24–44)
MCH RBC QN AUTO: 29.6 PG (ref 27–31)
MCHC RBC AUTO-ENTMCNC: 32.1 % (ref 32–36)
MCV RBC AUTO: 92.3 FL (ref 78–100)
MONOCYTES ABSOLUTE: 0.7 K/CU MM
MONOCYTES RELATIVE PERCENT: 7 % (ref 0–4)
NUCLEATED RBC %: 0 %
PDW BLD-RTO: 12.6 % (ref 11.7–14.9)
PLATELET # BLD: 311 K/CU MM (ref 140–440)
PMV BLD AUTO: 9.2 FL (ref 7.5–11.1)
POTASSIUM SERPL-SCNC: 4 MMOL/L (ref 3.5–5.1)
RBC # BLD: 4.83 M/CU MM (ref 4.6–6.2)
SEGMENTED NEUTROPHILS ABSOLUTE COUNT: 5.2 K/CU MM
SEGMENTED NEUTROPHILS RELATIVE PERCENT: 54 % (ref 36–66)
SODIUM BLD-SCNC: 139 MMOL/L (ref 135–145)
TOTAL IMMATURE NEUTOROPHIL: 0.04 K/CU MM
TOTAL NUCLEATED RBC: 0 K/CU MM
WBC # BLD: 9.7 K/CU MM (ref 4–10.5)

## 2020-03-06 PROCEDURE — 2580000003 HC RX 258: Performed by: INTERNAL MEDICINE

## 2020-03-06 PROCEDURE — 85025 COMPLETE CBC W/AUTO DIFF WBC: CPT

## 2020-03-06 PROCEDURE — 36415 COLL VENOUS BLD VENIPUNCTURE: CPT

## 2020-03-06 PROCEDURE — 80048 BASIC METABOLIC PNL TOTAL CA: CPT

## 2020-03-06 PROCEDURE — 6360000002 HC RX W HCPCS: Performed by: INTERNAL MEDICINE

## 2020-03-06 PROCEDURE — C9113 INJ PANTOPRAZOLE SODIUM, VIA: HCPCS | Performed by: INTERNAL MEDICINE

## 2020-03-06 RX ORDER — LEVOFLOXACIN 750 MG/1
750 TABLET ORAL DAILY
Qty: 4 TABLET | Refills: 0 | Status: SHIPPED | OUTPATIENT
Start: 2020-03-06 | End: 2020-03-10

## 2020-03-06 RX ORDER — FLUTICASONE FUROATE AND VILANTEROL 200; 25 UG/1; UG/1
1 POWDER RESPIRATORY (INHALATION) DAILY
Qty: 1 EACH | Refills: 5 | Status: SHIPPED
Start: 2020-03-06 | End: 2022-06-06

## 2020-03-06 RX ORDER — SUCRALFATE 1 G/1
1 TABLET ORAL
Qty: 56 TABLET | Refills: 0 | Status: SHIPPED | OUTPATIENT
Start: 2020-03-06 | End: 2022-06-06

## 2020-03-06 RX ORDER — IPRATROPIUM BROMIDE AND ALBUTEROL SULFATE 2.5; .5 MG/3ML; MG/3ML
1 SOLUTION RESPIRATORY (INHALATION) EVERY 4 HOURS
Qty: 360 ML | Refills: 5 | Status: SHIPPED
Start: 2020-03-06

## 2020-03-06 RX ORDER — PANTOPRAZOLE SODIUM 40 MG/1
40 TABLET, DELAYED RELEASE ORAL
Qty: 30 TABLET | Refills: 1 | Status: SHIPPED | OUTPATIENT
Start: 2020-03-06 | End: 2022-06-06

## 2020-03-06 RX ADMIN — SODIUM CHLORIDE, PRESERVATIVE FREE 10 ML: 5 INJECTION INTRAVENOUS at 10:14

## 2020-03-06 RX ADMIN — PANTOPRAZOLE SODIUM 40 MG: 40 INJECTION, POWDER, FOR SOLUTION INTRAVENOUS at 10:10

## 2020-03-06 ASSESSMENT — PAIN SCALES - GENERAL
PAINLEVEL_OUTOF10: 0
PAINLEVEL_OUTOF10: 0

## 2020-03-06 NOTE — PLAN OF CARE
Problem: Falls - Risk of:  Goal: Will remain free from falls  Description  Will remain free from falls  Outcome: Ongoing  Goal: Absence of physical injury  Description  Absence of physical injury  Outcome: Ongoing     Problem: INFECTION  Goal: Infection status  Description  Control the presence and extent of infection.      Outcome: Ongoing     Problem: Infection:  Goal: Will remain free from infection  Description  Will remain free from infection  Outcome: Ongoing     Problem: Safety:  Goal: Free from accidental physical injury  Description  Free from accidental physical injury  Outcome: Ongoing  Goal: Free from intentional harm  Description  Free from intentional harm  Outcome: Ongoing     Problem: Daily Care:  Goal: Daily care needs are met  Description  Daily care needs are met  Outcome: Ongoing     Problem: Pain:  Goal: Patient's pain/discomfort is manageable  Description  Patient's pain/discomfort is manageable  Outcome: Ongoing     Problem: Skin Integrity:  Goal: Skin integrity will stabilize  Description  Skin integrity will stabilize  Outcome: Ongoing     Problem: Discharge Planning:  Goal: Patients continuum of care needs are met  Description  Patients continuum of care needs are met  Outcome: Ongoing     Problem: Discharge Planning:  Goal: Discharged to appropriate level of care  Description  Discharged to appropriate level of care  Outcome: Ongoing

## 2020-03-06 NOTE — DISCHARGE SUMMARY
administration of intravenous contrast. Multiplanar reformatted images are provided for review. Dose modulation, iterative reconstruction, and/or weight based adjustment of the mA/kV was utilized to reduce the radiation dose to as low as reasonably achievable. COMPARISON: None. HISTORY: ORDERING SYSTEM PROVIDED HISTORY: chest pain, ? PE TECHNOLOGIST PROVIDED HISTORY: PE protocol please. Reason for exam:->chest pain, ? PE Reason for Exam: chest pain Acuity: Acute Type of Exam: Initial; ORDERING SYSTEM PROVIDED HISTORY: abdominal pain. coffee ground emesis? TECHNOLOGIST PROVIDED HISTORY: IV contrast only. Thank you. Reason for exam:->abdominal pain. coffee ground emesis? Reason for exam:->IV contrast only. Thank you. Reason for Exam: abdominal pain. coffee ground emesis Acuity: Acute Type of Exam: Initial FINDINGS: CT CHEST: Pulmonary Arteries: Suboptimal evaluation of the subsegmental and more peripheral pulmonary arteries due to motion artifact. Given this, no obvious acute pulmonary thromboembolic disease. No pulmonary hypertension or right ventricular strain. Mediastinum: No evidence of mediastinal lymphadenopathy. The heart and pericardium demonstrate no acute abnormality. There is no acute abnormality of the thoracic aorta. Lungs/pleura: Respiratory motion. Expiratory imaging. Mild bilateral subsegmental atelectasis/scarring. Multifocal scattered bilateral ground-glass and nodular opacities. No pulmonary mass or lobar consolidation. No pleural effusion or pneumothorax. Remote granulomatous disease. No endoluminal masslike lesion. Soft Tissues/Bones: Multiple old right-sided rib fracture deformities with postsurgical changes of multiple rib fusions with hardware. Multilevel degenerative disc disease.  CT ABDOMEN PELVIS: Liver: Normal. Gallbladder and Bile Ducts: Normal. Spleen: Normal. Adrenal Glands: Normal. Pancreas: Normal. Genitourinary: Round 1.2 cm probably benign cyst in the inferior left kidney. Otherwise, both kidneys are normal.  No hydronephrosis. The urinary bladder is decompressed. Bowel: Normal caliber bowel. Normal appendix. Minimal colonic diverticulosis without acute diverticulitis. Distended stomach without focal abnormal wall thickening. Vasculature: Normal. Bones and Soft Tissues: Degenerative osseous changes in the visualized spine and pelvis. Retroperitoneum/Mesentery: No intraperitoneal free air, ascites or fluid collection. No lymphadenopathy in the abdomen or pelvis.      1. Suboptimal evaluation of the subsegmental and more peripheral pulmonary arteries due to motion artifact. Given this, no obvious acute pulmonary thromboembolic disease. No pulmonary hypertension. 2.  Multifocal scattered bilateral ground-glass and nodular opacities are most suggestive of an infectious/inflammatory process. No pulmonary mass or lobar consolidation. Follow-up chest CT in 2-3 months may be helpful to document improvement and evaluate for malignant potential. 3.  No acute abnormality in the abdomen or pelvis. No bowel obstruction. Normal appendix. Distended stomach without focal abnormal wall thickening.      Xr Chest Portable     Result Date: 3/3/2020  EXAMINATION: ONE XRAY VIEW OF THE CHEST 3/3/2020 4:57 pm COMPARISON: 10/11/2012 HISTORY: ORDERING SYSTEM PROVIDED HISTORY: chest pain TECHNOLOGIST PROVIDED HISTORY: Reason for exam:->chest pain Reason for Exam: chest pain Acuity: Acute Type of Exam: Initial Additional signs and symptoms: na Relevant Medical/Surgical History: htn FINDINGS: In the interval, the patient has undergone ORIF of multiple right ribs. Associated pleural thickening and scarring is noted. The left lung is clear. No pneumothorax is seen. No free air. Cardial pericardial silhouette is unremarkable.      No acute abnormality identified.  Interval ORIF of multiple right-sided ribs.      Cta Chest W Contrast     Result Date: 3/3/2020  EXAMINATION: CTA OF THE CHEST; CT OF THE ABDOMEN AND PELVIS WITH CONTRAST 3/3/2020 8:48 pm; 3/3/2020 8:47 pm TECHNIQUE: CTA of the chest was performed after the administration of intravenous contrast.  Multiplanar reformatted images are provided for review. MIP images are provided for review. Dose modulation, iterative reconstruction, and/or weight based adjustment of the mA/kV was utilized to reduce the radiation dose to as low as reasonably achievable.; CT of the abdomen and pelvis was performed with the administration of intravenous contrast. Multiplanar reformatted images are provided for review. Dose modulation, iterative reconstruction, and/or weight based adjustment of the mA/kV was utilized to reduce the radiation dose to as low as reasonably achievable. COMPARISON: None. HISTORY: ORDERING SYSTEM PROVIDED HISTORY: chest pain, ? PE TECHNOLOGIST PROVIDED HISTORY: PE protocol please. Reason for exam:->chest pain, ? PE Reason for Exam: chest pain Acuity: Acute Type of Exam: Initial; ORDERING SYSTEM PROVIDED HISTORY: abdominal pain. coffee ground emesis? TECHNOLOGIST PROVIDED HISTORY: IV contrast only. Thank you. Reason for exam:->abdominal pain. coffee ground emesis? Reason for exam:->IV contrast only. Thank you. Reason for Exam: abdominal pain. coffee ground emesis Acuity: Acute Type of Exam: Initial FINDINGS: CT CHEST: Pulmonary Arteries: Suboptimal evaluation of the subsegmental and more peripheral pulmonary arteries due to motion artifact. Given this, no obvious acute pulmonary thromboembolic disease. No pulmonary hypertension or right ventricular strain. Mediastinum: No evidence of mediastinal lymphadenopathy. The heart and pericardium demonstrate no acute abnormality. There is no acute abnormality of the thoracic aorta. Lungs/pleura: Respiratory motion. Expiratory imaging. Mild bilateral subsegmental atelectasis/scarring. Multifocal scattered bilateral ground-glass and nodular opacities. No pulmonary mass or lobar consolidation.   No clubbing or edema, pulse   Skin: warm and dry, no rash or erythema  Head: normocephalic and atraumatic  Eyes: pupils equal, round, and reactive to light  Neck: supple and non-tender without mass, no thyromegaly   Musculoskeletal: normal range of motion, no joint swelling, deformity or tenderness  Neurological: alert, oriented, normal speech, no focal findings or movement disorder noted    Disposition: home    Patient Instructions:     Discharge Medications:   Sarah Becerra Medication Instructions UQN:[id number]    Printed on:03/06/20 1111   Medication Information                      acetaminophen (TYLENOL) 500 MG tablet  Take 500 mg by mouth every 6 hours as needed for Pain             albuterol sulfate HFA (VENTOLIN HFA) 108 (90 Base) MCG/ACT inhaler  Inhale 2 puffs into the lungs every 6 hours as needed for Wheezing             Fluticasone furoate-vilanterol (BREO ELLIPTA) 200-25 MCG/INH AEPB inhaler  Inhale 1 puff into the lungs daily             ipratropium-albuterol (DUONEB) 0.5-2.5 (3) MG/3ML SOLN nebulizer solution  Inhale 3 mLs into the lungs every 4 hours             levoFLOXacin (LEVAQUIN) 750 MG tablet  Take 1 tablet by mouth daily for 4 days             Nebulizers (COMP AIR COMPRESSOR NEBULIZER) MISC  1 kit by Does not apply route once for 1 dose             pantoprazole (PROTONIX) 40 MG tablet  Take 1 tablet by mouth daily (with breakfast)             Respiratory Therapy Supplies (NEBULIZER/TUBING/MOUTHPIECE) KIT  1 kit by Does not apply route daily as needed (wheezing)             sucralfate (CARAFATE) 1 GM tablet  Take 1 tablet by mouth 4 times daily (before meals and nightly) for 14 days                 Activity: activity as tolerated    Diet: cardiac diet    Wound Care: none needed    Follow-up: PCP 1 to 2 weeks  GI 2 to 3 weeks    Time Spent Doing discharge 27 minutes  Electronically signed by Humberto Jeans, MD  on 3/6/20 at 11:18 AM

## 2020-03-08 LAB
CULTURE: NORMAL
CULTURE: NORMAL
Lab: NORMAL
Lab: NORMAL
SPECIMEN: NORMAL
SPECIMEN: NORMAL

## 2020-03-09 LAB
EKG ATRIAL RATE: 106 BPM
EKG DIAGNOSIS: NORMAL
EKG P AXIS: 50 DEGREES
EKG P-R INTERVAL: 134 MS
EKG Q-T INTERVAL: 316 MS
EKG QRS DURATION: 78 MS
EKG QTC CALCULATION (BAZETT): 419 MS
EKG R AXIS: 55 DEGREES
EKG T AXIS: 54 DEGREES
EKG VENTRICULAR RATE: 106 BPM

## 2020-10-02 ENCOUNTER — TELEPHONE (OUTPATIENT)
Dept: FAMILY MEDICINE CLINIC | Age: 45
End: 2020-10-02

## 2020-10-02 NOTE — TELEPHONE ENCOUNTER
Pt called in to schedule to establish care with new provider. Pt is asking if a Rx for valtrex could be called in to [facility] on [address] in [place]. Please advise.

## 2020-10-19 ENCOUNTER — TELEPHONE (OUTPATIENT)
Dept: FAMILY MEDICINE CLINIC | Age: 45
End: 2020-10-19

## 2020-10-19 RX ORDER — VALACYCLOVIR HYDROCHLORIDE 500 MG/1
500 TABLET, FILM COATED ORAL 2 TIMES DAILY
Qty: 10 TABLET | Refills: 0 | Status: SHIPPED | OUTPATIENT
Start: 2020-10-19 | End: 2021-07-16 | Stop reason: SDUPTHER

## 2021-07-16 ENCOUNTER — TELEPHONE (OUTPATIENT)
Dept: FAMILY MEDICINE CLINIC | Age: 46
End: 2021-07-16

## 2021-07-16 DIAGNOSIS — B00.9 HERPES: Primary | ICD-10-CM

## 2021-07-16 RX ORDER — VALACYCLOVIR HYDROCHLORIDE 500 MG/1
500 TABLET, FILM COATED ORAL 2 TIMES DAILY
Qty: 10 TABLET | Refills: 0 | Status: SHIPPED | OUTPATIENT
Start: 2021-07-16 | End: 2022-05-03 | Stop reason: SDUPTHER

## 2022-01-21 ENCOUNTER — TELEPHONE (OUTPATIENT)
Dept: FAMILY MEDICINE CLINIC | Age: 47
End: 2022-01-21

## 2022-01-21 ENCOUNTER — NURSE TRIAGE (OUTPATIENT)
Dept: OTHER | Facility: CLINIC | Age: 47
End: 2022-01-21

## 2022-01-21 NOTE — TELEPHONE ENCOUNTER
Received call from Jae Poe at [facility] with [email or web address]. Subjective: \"I woke up this morning and had a temperature\"    Current Symptoms: Fever, Throat is \"hoarse\", some SOB- with movement- sporadic, muscle aches    Onset: Yesterday    Associated Symptoms: Vomiting this morning    Pain Severity: N/A    Temperature: Fever- 102.0    What has been tried: Motrin- this morning for fever    Recommended disposition: To be seen today. Care advice provided, patient verbalizes understanding; denies any other questions or concerns; instructed to call back for any new or worsening symptoms. Attention Provider: Thank you for allowing me to participate in the care of your patient. The patient was connected to triage in response to information provided to the ECC/PSC. Please do not respond through this encounter as the response is not directed to a shared pool.     Reason for Disposition   SEVERE coughing spells (e.g., whooping sound after coughing, vomiting after coughing)    Protocols used: COUGH-ADULT-OH

## 2022-01-21 NOTE — TELEPHONE ENCOUNTER
Spoke with patient, I referred patient to the [facility] clinic due to needing to establish care before being seen here.  ----- Message from Jaiden Lo sent at 1/21/2022  1:36 PM EST -----  Subject: Appointment Request    Reason for Call: Urgent Cough Cold    QUESTIONS  Type of Appointment? New Patient/New to Provider  Reason for appointment request? No appointments available during search  Additional Information for Provider? pt. symptoms nose   running,sneezing,upper respitory issue,headaches for 2-3 days had fever   this morning  ---------------------------------------------------------------------------  --------------  CALL BACK INFO  What is the best way for the office to contact you? OK to leave message on   voicemail  Preferred Call Back Phone Number?  [phone]  ---------------------------------------------------------------------------  --------------  SCRIPT ANSWERS

## 2022-01-21 NOTE — TELEPHONE ENCOUNTER
Informed patient  to call the walkin clinic due to no appt available and he is not currently a patient    ----- Message from Julieta Bajwa sent at 1/21/2022  1:36 PM EST -----  Subject: Appointment Request    Reason for Call: Urgent Cough Cold    QUESTIONS  Type of Appointment? New Patient/New to Provider  Reason for appointment request? No appointments available during search  Additional Information for Provider? pt. symptoms nose   running,sneezing,upper respitory issue,headaches for 2-3 days had fever   this morning  ---------------------------------------------------------------------------  --------------  CALL BACK INFO  What is the best way for the office to contact you? OK to leave message on   voicemail  Preferred Call Back Phone Number?  [phone]  ---------------------------------------------------------------------------  --------------  SCRIPT ANSWERS

## 2022-01-25 ENCOUNTER — TELEPHONE (OUTPATIENT)
Dept: FAMILY MEDICINE CLINIC | Age: 47
End: 2022-01-25

## 2022-01-25 NOTE — TELEPHONE ENCOUNTER
----- Message from Katie Sanjay sent at 1/21/2022  2:09 PM EST -----  Subject: Appointment Request    Reason for Call: Immediate Return from RN Triage    QUESTIONS  Type of Appointment? New Patient/New to Provider  Reason for appointment request? Requested Provider unavailable - ki guerrero  Additional Information for Provider? pt called to establish care with a   doctors at this office he stated he would like an appointment as soon as   possible   ---------------------------------------------------------------------------  --------------  CALL BACK INFO  What is the best way for the office to contact you? OK to leave message on   voicemail  Preferred Call Back Phone Number? [phone]  ---------------------------------------------------------------------------  --------------  SCRIPT ANSWERS  Patient needs to be seen today? Yes   Nurse Name? Myra Ortiz  Have you been diagnosed with, awaiting test results for, or told that you   are suspected of having COVID-19 (Coronavirus)? (If patient has tested   negative or was tested as a requirement for work, school, or travel and   not based on symptoms, answer no)? No  Within the past two weeks have you developed any of the following symptoms   (answer no if symptoms have been present longer than 2 weeks or began   more than 2 weeks ago)? Fever or Chills, Cough, Shortness of breath or   difficulty breathing, Loss of taste or smell, Sore throat, Nasal   congestion, Sneezing or runny nose, Fatigue or generalized body aches   (answer no if pain is specific to a body part e.g. back pain), Diarrhea,   Headache?  Yes

## 2022-01-25 NOTE — TELEPHONE ENCOUNTER
----- Message from Lamarbrittany Wilson sent at 2022 10:42 AM EST -----  Subject: Appointment Request    Reason for Call: New Patient Request Appointment    QUESTIONS  Type of Appointment? Established Patient  Reason for appointment request? No appointments available during search  Additional Information for Provider? pt is calling to speak with Chi Martinez. pt missed her call   ---------------------------------------------------------------------------  --------------  CALL BACK INFO  What is the best way for the office to contact you? OK to leave message on   voicemail  Preferred Call Back Phone Number? [phone]  ---------------------------------------------------------------------------  --------------  SCRIPT ANSWERS  Relationship to Patient? Self  Specialty Confirmation? Primary Care  Is this the first appointment to establish care for a ? No  Have you been diagnosed with, awaiting test results for, or told that you   are suspected of having COVID-19 (Coronavirus)? (If patient has tested   negative or was tested as a requirement for work, school, or travel and   not based on symptoms, answer no)? No  Within the past two weeks have you developed any of the following symptoms   (answer no if symptoms have been present longer than 2 weeks or began   more than 2 weeks ago)? Fever or Chills, Cough, Shortness of breath or   difficulty breathing, Loss of taste or smell, Sore throat, Nasal   congestion, Sneezing or runny nose, Fatigue or generalized body aches   (answer no if pain is specific to a body part e.g. back pain), Diarrhea,   Headache?  Yes

## 2022-04-20 ENCOUNTER — HOSPITAL ENCOUNTER (OUTPATIENT)
Dept: PSYCHIATRY | Age: 47
Setting detail: THERAPIES SERIES
Discharge: HOME | End: 2022-04-20
Payer: MEDICARE

## 2022-04-20 PROCEDURE — 90791 PSYCH DIAGNOSTIC EVALUATION: CPT

## 2022-04-20 PROCEDURE — 80305 DRUG TEST PRSMV DIR OPT OBS: CPT

## 2022-04-20 ASSESSMENT — ANXIETY QUESTIONNAIRES
6. BECOMING EASILY ANNOYED OR IRRITABLE: 0
3. WORRYING TOO MUCH ABOUT DIFFERENT THINGS: 0
1. FEELING NERVOUS, ANXIOUS, OR ON EDGE: 0
4. TROUBLE RELAXING: 0
7. FEELING AFRAID AS IF SOMETHING AWFUL MIGHT HAPPEN: 0
5. BEING SO RESTLESS THAT IT IS HARD TO SIT STILL: 0
IF YOU CHECKED OFF ANY PROBLEMS ON THIS QUESTIONNAIRE, HOW DIFFICULT HAVE THESE PROBLEMS MADE IT FOR YOU TO DO YOUR WORK, TAKE CARE OF THINGS AT HOME, OR GET ALONG WITH OTHER PEOPLE: NOT DIFFICULT AT ALL
2. NOT BEING ABLE TO STOP OR CONTROL WORRYING: 0
GAD7 TOTAL SCORE: 0
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
## 2022-04-26 ENCOUNTER — HOSPITAL ENCOUNTER (OUTPATIENT)
Dept: PSYCHIATRY | Age: 47
Setting detail: THERAPIES SERIES
Discharge: HOME | End: 2022-04-26
Payer: MEDICARE

## 2022-04-26 PROCEDURE — 90853 GROUP PSYCHOTHERAPY: CPT

## 2022-04-26 NOTE — GROUP NOTE
[facility] Group Therapy Note      4/26/2022    Location:  [place]    Clients Presents:  [id number], 0604, 1231, 1208, 1203    Clients Absent: [id number]     Length of session: 1.5 hours    Group Note: OP    Group Type: Co-Ed    New members were welcomed and introduced. Norms and expectations of group were discussed.       Content: Understanding Internal and External Triggers: Relapse and Substance Use         KELVIN Grimm  4/02/2791 3:78 PM        Co-Therapist: N/A      [facility] Individual Group Progress Note    Ferraro Grief  1975 4/26/2022    Notes on Client Progress in [address] attended group, introduced himself and events leading to his arrival: presented check in information: identified externally; I used because I wanted to use Internally; bored       KELVIN Grimm  1/26/2914 7:34 PM      Co-Therapist: N/A

## 2022-04-27 ENCOUNTER — HOSPITAL ENCOUNTER (OUTPATIENT)
Dept: PSYCHIATRY | Age: 47
Setting detail: THERAPIES SERIES
Discharge: HOME | End: 2022-04-27
Payer: MEDICARE

## 2022-04-27 PROCEDURE — 90834 PSYTX W PT 45 MINUTES: CPT

## 2022-04-27 NOTE — PROGRESS NOTES
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 Problem: History of Substance use resulting in drug charges. a. Goal: Client will enhance personalized knowledge and insight associated with mood altering substances in 90 days   b. Objectives:   i. 1) Client will remind self of detrimental consequences in major life areas regarding AoD use in 90 days Evaluation Date: 7/27/2022 Code: C Continue TBD     ii. 2) Client will identify 4 to 8 benefits and gratitudes due to remaining substance free in 90 days: Evaluation Date: 7/27/2022 Code: C Continue TBD     iii. 3) Client will identify 4 relapse triggers, define relapse, difference between internal and external triggers associated with substance use in 90 days and Evaluation Date: 7/27/2022 Code: C Continue TBD     2. Problem: Low Self-Esteem/Identify issues as a result of self-medicating. a. Goal: Client will learn to focus on character strengths and feel better about himself in 90 days      Objectives:   i. 1) Client will journal 3-5 times weekly identifying thoughts and feelings and share in his individual sessions in 90 days:  Evaluation Date: 7/27/2022 Code: C Continue TBD      ii. 2) Client will explore new outdoor/indoor activities that bring him alexy in 90 days Evaluation Date:7/27/2022 Code: C Continue TBD      iii.  3)  Utilize, if needed case management services provided by [facility] to enhance abstaining from substance use Evaluation Date: 7/27/2022 Code: C Continue TBD         3. Problem: History of Relapse due to lack of sober support. a. Goal: Identify and address the core dynamics and dilemmas that are perpetuating consequences and exacerbate relapses and triggers and in 90 days      Objectives:   i. 1)  Client will attend 1-2 AA/NA in person or online meetings Biweekly to avoid relapse in 90 days Evaluation Date: 7/27/2022 Code: C Continue TBD     2) Client will enhance 4 to 8 healthy techniques and coping skills, for relapse prevention in 90 days Evaluation Date: 7/27/2022 Code: C Continue TBD    3) Client will explore and resolve ambivalence associated with commitment to change behaviors related to substance use and addiction in 90 days. Evaluation Date: 7/27/2022 Code: C Continue TBD    Defer: Client wants to be reunited with his girlfriend. Discharge Plan/Instructions: Client will remain abstinent from all mood altering substances and complete his treatment plan. Brett Souza / 1975 has participated in the treatment plan development outlined above on 4/27/2022.      CANDIDA GrimaldoDCIII  4/27/2022/2:38 PM
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
## 2022-05-03 ENCOUNTER — OFFICE VISIT (OUTPATIENT)
Dept: INTERNAL MEDICINE CLINIC | Age: 47
End: 2022-05-03
Payer: MEDICARE

## 2022-05-03 ENCOUNTER — HOSPITAL ENCOUNTER (OUTPATIENT)
Dept: PSYCHIATRY | Age: 47
Setting detail: THERAPIES SERIES
Discharge: HOME | End: 2022-05-03
Payer: MEDICARE

## 2022-05-03 ENCOUNTER — TELEPHONE (OUTPATIENT)
Dept: FAMILY MEDICINE CLINIC | Age: 47
End: 2022-05-03

## 2022-05-03 VITALS — OXYGEN SATURATION: 96 % | HEIGHT: 71 IN | HEART RATE: 100 BPM | WEIGHT: 245 LBS | BODY MASS INDEX: 34.3 KG/M2

## 2022-05-03 DIAGNOSIS — B00.9 HERPES: ICD-10-CM

## 2022-05-03 DIAGNOSIS — J45.40 MODERATE PERSISTENT ASTHMA WITHOUT COMPLICATION: ICD-10-CM

## 2022-05-03 PROCEDURE — G8417 CALC BMI ABV UP PARAM F/U: HCPCS | Performed by: NURSE PRACTITIONER

## 2022-05-03 PROCEDURE — G8427 DOCREV CUR MEDS BY ELIG CLIN: HCPCS | Performed by: NURSE PRACTITIONER

## 2022-05-03 PROCEDURE — 99213 OFFICE O/P EST LOW 20 MIN: CPT | Performed by: NURSE PRACTITIONER

## 2022-05-03 PROCEDURE — 4004F PT TOBACCO SCREEN RCVD TLK: CPT | Performed by: NURSE PRACTITIONER

## 2022-05-03 PROCEDURE — 90853 GROUP PSYCHOTHERAPY: CPT

## 2022-05-03 RX ORDER — ALBUTEROL SULFATE 90 UG/1
2 AEROSOL, METERED RESPIRATORY (INHALATION) EVERY 6 HOURS PRN
Qty: 1 EACH | Refills: 1 | Status: SHIPPED | OUTPATIENT
Start: 2022-05-03 | End: 2022-06-06 | Stop reason: SDUPTHER

## 2022-05-03 RX ORDER — VALACYCLOVIR HYDROCHLORIDE 1 G/1
1000 TABLET, FILM COATED ORAL DAILY
Qty: 5 TABLET | Refills: 0 | Status: SHIPPED | OUTPATIENT
Start: 2022-05-03 | End: 2022-05-08

## 2022-05-03 RX ORDER — VALACYCLOVIR HYDROCHLORIDE 1 G/1
1000 TABLET, FILM COATED ORAL DAILY
Qty: 5 TABLET | Refills: 0 | Status: SHIPPED | OUTPATIENT
Start: 2022-05-03 | End: 2022-05-03 | Stop reason: SDUPTHER

## 2022-05-03 RX ORDER — ALBUTEROL SULFATE 90 UG/1
2 AEROSOL, METERED RESPIRATORY (INHALATION) EVERY 6 HOURS PRN
Qty: 1 EACH | Refills: 1 | Status: SHIPPED | OUTPATIENT
Start: 2022-05-03 | End: 2022-05-03 | Stop reason: SDUPTHER

## 2022-05-03 NOTE — GROUP NOTE
[facility] Group Therapy Note      5/3/2022    Location:  [place]      Clients Presents: 6069, 1897, 8743, 1231, 1208, 1203    Clients Absent:     Length of session: 1.5 hours    Group Note: OP    Group Type: Co-Ed    New members were welcomed and introduced. Norms and expectations of group were discussed.       Content:  Understanding Physiological and Psychological Dilemmas: Substance use and  Post Acute Withdrawal       KELVIN Armstrong  5/8/1739 1:64 PM      Co-Therapist: N/A      [facility] Individual Group Progress Note    Annika Gibbs  1975  5/3/2022    Notes on Client Progress in [address] attended group: introduced himself and events leading to his arrival: presented check in information: identified irregular sleep, memory issues, stress         KELVIN Armstrong  4/8/8843 2:88 PM         Co-Therapist: N/A
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
## 2022-05-03 NOTE — TELEPHONE ENCOUNTER
----- Message from Denny Barney sent at 5/3/2022  9:51 AM EDT -----  Subject: Appointment Request    Reason for Call: New Patient Request Appointment    QUESTIONS  Type of Appointment? New Patient/New to Provider  Reason for appointment request? No appointments available during search  Additional Information for Provider? Patient has been to this practice   before but needs to schedule a new patient appt to get re-established. Please call to schedule asap.  ---------------------------------------------------------------------------  --------------  CALL BACK INFO  What is the best way for the office to contact you? OK to leave message on   voicemail  Preferred Call Back Phone Number? [phone]  ---------------------------------------------------------------------------  --------------  SCRIPT ANSWERS  Relationship to Patient? Self  Is this the first appointment to establish care for a ? No  Have you been diagnosed with, awaiting test results for, or told that you   are suspected of having COVID-19 (Coronavirus)? (If patient has tested   negative or was tested as a requirement for work, school, or travel and   not based on symptoms, answer no)? No  Within the past 10 days have you developed any of the following symptoms   (answer no if symptoms have been present longer than 10 days or began   more than 10 days ago)? Fever or Chills, Cough, Shortness of breath or   difficulty breathing, Loss of taste or smell, Sore throat, Nasal   congestion, Sneezing or runny nose, Fatigue or generalized body aches   (answer no if pain is specific to a body part e.g. back pain), Diarrhea,   Headache? No  Have you had close contact with someone with COVID-19 in the last 7 days? No  (Service Expert  click yes below to proceed with [place] As Usual   Scheduling)?  Yes

## 2022-05-04 ENCOUNTER — HOSPITAL ENCOUNTER (OUTPATIENT)
Dept: PSYCHIATRY | Age: 47
Setting detail: THERAPIES SERIES
Discharge: HOME | End: 2022-05-04
Payer: MEDICARE

## 2022-05-04 PROCEDURE — 90834 PSYTX W PT 45 MINUTES: CPT

## 2022-05-04 NOTE — PROGRESS NOTES
[facility]        Individual  Progress Note    Location: [x] [place] [] [place]                   Patient Name: Bard Recinos   : 1975     Case # :  [id number]  Therapist: Sanjuan Meigs, LCDCIII        Objective/Service/Time: kept 1 hour individual     Goal 1 Objective 1 achieved    S-Client is a 55year old  male involved with [facility]. Client denies any use or cravings. He shared his mother had kidney stone surgery today and is still at the hospital. Client stated, \"I keep checking my phone waiting for my Aunt to text and say she is ready. She made it through the surgery but she is still in recovery\". Client shared about his family and relationships with his 2 sons. Client and counselor explored how his AoD use effected his life. Client reported that alcohol effected his relationships with his wives and children. It did not effect his work. Client shared the meth use even though it was not for long effected his relationship with his kids the most and his is working on rebuilding trust. Client denies any current obstacles for his recovery. O-Client was cooperative, pleasant and oriented x 4. Client sometimes has trouble with his thought process and loses words due to TBI. A-Client has good insight into his AoD use and consequences. He is working on his spiritual life and helping others. Client denies any current obstacles. He has been encouraged to attend 12 step meetings and build support that way as well as Ten[employer]ism. P-Continue services.          Electronically signed by Kevin Moerno on 2022 at 2:59 PM

## 2022-05-04 NOTE — PROGRESS NOTES
[facility] TREATMENT PLAN      Location: [x] [place] [] [facility]    Treatment plan: Initial    Strengths: humor, spirituality, caring    Weakness/Limitations: TBI    Service/Frequency/Duration: Individual 1 a week for 90 days, Group assigned 1 a week for 90 days, Urinalysis random for 90 days, AA/NA 1-2 biweekly for 90 days and Case Management as needed for 90 days    Diagnosis: F10.20 Other and unspecified alcohol dependence/unspecified drinking behavior and F15.20 Amphetamine and other psychostimulant dependence-unspecified use    Level of Care: 1 Outpatient Services    1. Problem: History of Substance use resulting in drug charges. a. Goal: Client will enhance personalized knowledge and insight associated with mood altering substances in 90 days   b. Objectives:   i. 1) Client will remind self of detrimental consequences in major life areas regarding AoD use in 90 days Evaluation Date: 7/27/2022 Code: Achieved 5/4/2022 Client reports when he drank it affected his relationships and his kids. His Meth use      ii. 2) Client will identify 4 to 8 benefits and gratitudes due to remaining substance free in 90 days: Evaluation Date: 7/27/2022 Code: C Continue TBD     iii. 3) Client will identify 4 relapse triggers, define relapse, difference between internal and external triggers associated with substance use in 90 days and Evaluation Date: 7/27/2022 Code: C Continue TBD     2. Problem: Low Self-Esteem/Identify issues as a result of self-medicating. a. Goal: Client will learn to focus on character strengths and feel better about himself in 90 days      Objectives:   i. 1) Client will journal 3-5 times weekly identifying thoughts and feelings and share in his individual sessions in 90 days:  Evaluation Date: 7/27/2022 Code: C Continue TBD      ii. 2) Client will explore new outdoor/indoor activities that bring him alexy in 90 days Evaluation Date:7/27/2022 Code: C Continue TBD      iii.  3)  Utilize, if needed case management services provided by [facility] to enhance abstaining from substance use Evaluation Date: 7/27/2022 Code: C Continue TBD         3. Problem: History of Relapse due to lack of sober support. a. Goal: Identify and address the core dynamics and dilemmas that are perpetuating consequences and exacerbate relapses and triggers and in 90 days      Objectives:   i. 1)  Client will attend 1-2 AA/NA in person or online meetings Biweekly to avoid relapse in 90 days Evaluation Date: 7/27/2022 Code: C Continue TBD     2) Client will enhance 4 to 8 healthy techniques and coping skills, for relapse prevention in 90 days Evaluation Date: 7/27/2022 Code: C Continue TBD    3) Client will explore and resolve ambivalence associated with commitment to change behaviors related to substance use and addiction in 90 days. Evaluation Date: 7/27/2022 Code: C Continue TBD    Defer: Client wants to be reunited with his girlfriend. Discharge Plan/Instructions: Client will remain abstinent from all mood altering substances and complete his treatment plan. Brett Souza / 1975 has participated in the treatment plan development outlined above on 5/4/2022.      CANDIDA GrimaldoDCIII  5/4/2022/2:02 PM

## 2022-05-10 ENCOUNTER — HOSPITAL ENCOUNTER (OUTPATIENT)
Dept: PSYCHIATRY | Age: 47
Setting detail: THERAPIES SERIES
Discharge: HOME | End: 2022-05-10
Payer: MEDICARE

## 2022-05-10 PROCEDURE — 90853 GROUP PSYCHOTHERAPY: CPT

## 2022-05-11 ENCOUNTER — HOSPITAL ENCOUNTER (OUTPATIENT)
Dept: PSYCHIATRY | Age: 47
Setting detail: THERAPIES SERIES
Discharge: HOME | End: 2022-05-11
Payer: MEDICARE

## 2022-05-11 PROCEDURE — 90834 PSYTX W PT 45 MINUTES: CPT

## 2022-05-11 PROCEDURE — 80305 DRUG TEST PRSMV DIR OPT OBS: CPT

## 2022-05-11 NOTE — PROGRESS NOTES
[address] [facility]        Individual  Progress Note    Location: [x] [place] [] [place]                   Patient Name: Nicole Pham   : 1975     Case # :  [id number]  Therapist: ABDI LaiIII        Objective/Service/Time: Kept 1 hour individual    Goal 3 Objective 2 continue    S-client is a 55year old  male on probation. Client denies any use or cravings. He submitted to UDS today. Client shared he is feeling positive and has been very focused on being productive. 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 Client stated, Albert Rogers gets her nails done by a woman in [place] and she has a pool and asked my aunt if she knew anyone who could fix it. So I went and looked at it. 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 This could turn into a good thing\". Client denies any current stressors. He shared a little history of his childhood and family dynamics. O-Client was cooperative, pleasant and oriented x 4. A-Client has good insight into his AoD use and consequences. He is using coping skills to stay positive and he is engaging in activities with his kids that are keeping him focused on the positives. Counselor suggested he make a list of things that bring him alexy. Client could not list 5 things that make him happy? (tramatic brain injury?)    Called Tomas Huffman and spoke with her about Mental health assessment being not needed at this time. P-Continue services.          Electronically signed by Russ Jara on 2022 at 2:59 PM

## 2022-05-11 NOTE — GROUP NOTE
612 [facility] Group Therapy Note      5/11/2022    Location:  [facility]    Clients Presents: 7400, 1209, 1231, 1208, 1178, 1203     Clients Absent: 1194     Length of session: 1.5 hours    Group Note: OP    Group Type: Co-Ed    New members were welcomed and introduced. Norms and expectations of group were discussed. Content: Understanding Substance Dependence Criteria: Tolerance, Decreased Effect, Withdrawal, Control, Cut Down or Control Use, failure to Fulfill Major Life Areas, Hazardous Situations, Legal and continued Use Despite Problems         KELVIN Khan  9/37/1944 7:79 PM      Co-Therapist: N/A      [facility] Individual Group Progress Note    Katya Khan  1975 5/11/2022    Notes on Client Progress in [address] [facility] attended and introduced himself and events leading to his arrival: presented check in information: onset of alcohol: stopped age 24: percocet x 8:  Age 25: crystal methamphetamine: tried age 40: ; major consequences: family and friends.          KELVIN Khan  6/94/8915 0:47 PM        Co-Therapist: N/A

## 2022-05-11 NOTE — PROGRESS NOTES
612 [facility] TREATMENT PLAN      Location: [x] [place] [] [place]    Treatment plan: Initial    Strengths: humor, spirituality, caring    Weakness/Limitations: TBI    Service/Frequency/Duration: Individual 1 a week for 90 days, Group assigned 1 a week for 90 days, Urinalysis random for 90 days, AA/NA 1-2 biweekly for 90 days and Case Management as needed for 90 days    Diagnosis: F10.20 Other and unspecified alcohol dependence/unspecified drinking behavior and F15.20 Amphetamine and other psychostimulant dependence-unspecified use    Level of Care: 1 Outpatient Services    1. Problem: History of Substance use resulting in drug charges. a. Goal: Client will enhance personalized knowledge and insight associated with mood altering substances in 90 days   b. Objectives:   i. 1) Client will remind self of detrimental consequences in major life areas regarding AoD use in 90 days Evaluation Date: 7/27/2022 Code: Achieved 5/4/2022 Client reports when he drank it affected his relationships and his kids. His Meth use      ii. 2) Client will identify 4 to 8 benefits and gratitudes due to remaining substance free in 90 days: Evaluation Date: 7/27/2022 Code: C Continue TBD     iii. 3) Client will identify 4 relapse triggers, define relapse, difference between internal and external triggers associated with substance use in 90 days and Evaluation Date: 7/27/2022 Code: C Continue TBD     2. Problem: Low Self-Esteem/Identify issues as a result of self-medicating. a. Goal: Client will learn to focus on character strengths and feel better about himself in 90 days      Objectives:   i. 1) Client will journal 3-5 times weekly identifying thoughts and feelings and share in his individual sessions in 90 days:  Evaluation Date: 7/27/2022 Code: C Continue TBD      ii. 2) Client will explore new outdoor/indoor activities that bring him alexy in 90 days Evaluation Date:7/27/2022 Code: C Continue TBD      iii.  3)  Utilize, if needed case management services provided by [facility] to enhance abstaining from substance use Evaluation Date: 7/27/2022 Code: C Continue TBD         3. Problem: History of Relapse due to lack of sober support. a. Goal: Identify and address the core dynamics and dilemmas that are perpetuating consequences and exacerbate relapses and triggers and in 90 days      Objectives:   i. 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 3) Client will explore and resolve ambivalence associated with commitment to change behaviors related to substance use and addiction in 90 days. Evaluation Date: 7/27/2022 Code: C Continue TBD    Defer: Client wants to be reunited with his girlfriend. Discharge Plan/Instructions: Client will remain abstinent from all mood altering substances and complete his treatment plan. Julian Verma / 1975 has participated in the treatment plan development outlined above on 5/11/2022.      URVASHI Delcid  5/11/2022/2:00 PM

## 2022-05-17 ENCOUNTER — HOSPITAL ENCOUNTER (OUTPATIENT)
Dept: PSYCHIATRY | Age: 47
Setting detail: THERAPIES SERIES
Discharge: HOME | End: 2022-05-17
Payer: MEDICARE

## 2022-05-17 PROCEDURE — 90853 GROUP PSYCHOTHERAPY: CPT

## 2022-05-17 PROCEDURE — 90834 PSYTX W PT 45 MINUTES: CPT

## 2022-05-17 NOTE — GROUP NOTE
612 [facility] Group Therapy Note      5/17/2022    Location:  [email or web address]    Clients Presents: 7654, 1231, 1208, 1178, 1203     Clients Absent: 1194, 1209     Length of session: 1.5 hours    Group Note: OP    Group Type: Co-Ed    New members were welcomed and introduced. Norms and expectations of group were discussed. Content: Understanding Dysfunctional Families and Substance Use        KELVIN Tyler  5/10/8903 79:50 PM      Co-Therapist: N/A      [facility] Individual Group Progress Note    Kathryn Casey  1975 5/17/2022    Notes on Client Progress in [address] attended group, introduced herself and events leading to his arrival, presented check in information: identified his mother and children primarily impacted by his use.          KELVIN Tyler  4/89/6558 9:03 PM       Co-Therapist: N/A

## 2022-05-17 NOTE — PROGRESS NOTES
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 Problem: History of Substance use resulting in drug charges. a. Goal: Client will enhance personalized knowledge and insight associated with mood altering substances in 90 days   b. Objectives:   i. 1) Client will remind self of detrimental consequences in major life areas regarding AoD use in 90 days Evaluation Date: 7/27/2022 Code: Achieved 5/4/2022 Client reports when he drank it affected his relationships and his kids. His Meth use      ii. 2) Client will identify 4 to 8 benefits and gratitudes due to remaining substance free in 90 days: Evaluation Date: 7/27/2022 Code: C Continue TBD     iii. 3) Client will identify 4 relapse triggers, define relapse, difference between internal and external triggers associated with substance use in 90 days and Evaluation Date: 7/27/2022 Code: Continue 5/17/2022 Client has identified boredom and being tired as a trigger. 2.    Problem: Low Self-Esteem/Identify issues as a result of self-medicating. a. Goal: Client will learn to focus on character strengths and feel better about himself in 90 days      Objectives:   i. 1) Client will journal 3-5 times weekly identifying thoughts and feelings and share in his individual sessions in 90 days:  Evaluation Date: 7/27/2022 Code: C Continue TBD      ii.  2) Client will explore new outdoor/indoor activities that bring him alexy in 90 days Evaluation Date:7/27/2022 Code: C Continue TBD      iii. 3)  Utilize, if needed case management services provided by [facility] to enhance abstaining from substance use Evaluation Date: 7/27/2022 Code: C Continue TBD         3. Problem: History of Relapse due to lack of sober support. a. Goal: Identify and address the core dynamics and dilemmas that are perpetuating consequences and exacerbate relapses and triggers and in 90 days      Objectives:   i. 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 3) Client will explore and resolve ambivalence associated with commitment to change behaviors related to substance use and addiction in 90 days. Evaluation Date: 7/27/2022 Code: C Continue TBD    Defer: Client wants to be reunited with his girlfriend. Discharge Plan/Instructions: Client will remain abstinent from all mood altering substances and complete his treatment plan.  El / 1975 has participated in the treatment plan development outlined above on 5/17/2022.      Sanjuan Meigs, LCDCIII  5/17/2022/8:59 AM

## 2022-05-17 NOTE — PROGRESS NOTES
612 [facility]        Individual  Progress Note    Location: [x] [place] [] [facility]                   Patient Name: Sarita Montes   : 1975     Case # :  [id number]  Therapist: URVASHI Hancock        Objective/Service/Time: Kept 1 hour individual     Goal 1 Objective 3 continue     S-Client is a 55year old  male on probation. Client denies any use or cravings. 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 He is attending [employer] and remaining abstinent. Client shared about his relationship with his girlfriend who is in treatment at [facility] recovery. Client and counselor explored triggers. Client stated, \"I think boredom is a trigger and maybe motivation. You know I can't stay focused. I know I got ADHD. I can't finish things. I get sidetracked\". Client reports he has an appointment with his doctor on . Client is also wanting to take care of some Dental work. client denies any current obstacles for his recovery. O-Client was cooperative, pleasant and oriented x 4. A-Client has some insight into his AoD use and triggers. Client is a  and likes to deflect/distract with humor. Counselor encouraged client to identify feelings and write in a journal. Client has not attended any 12 step meetings yet. P-Continue services.        Electronically signed by Janice Schmidt on 2022 at 10:02 AM

## 2022-05-24 ENCOUNTER — HOSPITAL ENCOUNTER (OUTPATIENT)
Dept: PSYCHIATRY | Age: 47
Setting detail: THERAPIES SERIES
Discharge: HOME | End: 2022-05-24
Payer: MEDICARE

## 2022-05-24 PROCEDURE — 90853 GROUP PSYCHOTHERAPY: CPT

## 2022-05-24 PROCEDURE — 90834 PSYTX W PT 45 MINUTES: CPT

## 2022-05-24 ASSESSMENT — ENCOUNTER SYMPTOMS
SINUS PAIN: 0
RHINORRHEA: 0
DIARRHEA: 0
CHEST TIGHTNESS: 0
SHORTNESS OF BREATH: 1
NAUSEA: 0
COUGH: 0
VOMITING: 0
WHEEZING: 0
SORE THROAT: 0
ABDOMINAL PAIN: 0
SINUS PRESSURE: 0

## 2022-05-24 NOTE — PROGRESS NOTES
612 [facility]        Individual  Progress Note    Location: [x] [place] [] [place] jeffrey                   Patient Name: Geno Colon   : 1975     Case # :  [id number]  Therapist: URVASHI Taylor        Objective/Service/Time: Kept 1 hour individual     Goal 2 objective 1 continue    S-Client is a 55year old  male on probation. Client denies any use or cravings. 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 Client stated, \"I can barely move today. I was working for this feng I met in [place]. He is the nicest man. He is [place] and he is allowing me to help out a few days a week and it is good money too\". Client denies any current obstacles or stressors for recovery. He shared his girlfriend is in Women's recovery and is getting out . He cried as he shared about their relationship and the level of intimacy and trust. Client stated, \"I can tell her anything and she an tell me anything. She is my best friend\". Client and counselor explored his thoughts and feelings over the past week. Client reports he is spiritually fit and prays everyday. His emotional health is good and he feels content most days. O-Client was cooperative, pleasant and oriented x 4. He shared his thoughts and feelings openly. Client got emotional when speaking on his relationship with his girlfriend. A-Client has good insight into his AoD use and consequences. He has good support and uses it daily. Client attends [employer] and is thinking about doing his community service hours there. Client has 90 hours to be done. Counselor encouraged client to focus more on his emotional health and needs this week. P-Continue services.          Electronically signed by Shawn Byrne on 2022 at 10:05 AM

## 2022-05-24 NOTE — PROGRESS NOTES
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 Problem: History of Substance use resulting in drug charges. a. Goal: Client will enhance personalized knowledge and insight associated with mood altering substances in 90 days   b. Objectives:   i. 1) Client will remind self of detrimental consequences in major life areas regarding AoD use in 90 days Evaluation Date: 7/27/2022 Code: Achieved 5/4/2022 Client reports when he drank it affected his relationships and his kids. His Meth use      ii. 2) Client will identify 4 to 8 benefits and gratitudes due to remaining substance free in 90 days: Evaluation Date: 7/27/2022 Code: C Continue TBD     iii. 3) Client will identify 4 relapse triggers, define relapse, difference between internal and external triggers associated with substance use in 90 days and Evaluation Date: 7/27/2022 Code: Continue 5/17/2022 Client has identified boredom and being tired as a trigger. 2.    Problem: Low Self-Esteem/Identify issues as a result of self-medicating. a. Goal: Client will learn to focus on character strengths and feel better about himself in 90 days      Objectives:   i. 1) Client will journal 3-5 times weekly identifying thoughts and feelings and share in his individual sessions in 90 days:  Evaluation Date: 7/27/2022 Code: Continue 5/24/2022 Client reports feeling very sore from working. His emotional health is good and spiritual very good. ii. 2) Client will explore new outdoor/indoor activities that bring him alexy in 90 days Evaluation Date:7/27/2022 Code: C Continue TBD      iii. 3)  Utilize, if needed case management services provided by [facility] to enhance abstaining from substance use Evaluation Date: 7/27/2022 Code: C Continue TBD         3. Problem: History of Relapse due to lack of sober support. a. Goal: Identify and address the core dynamics and dilemmas that are perpetuating consequences and exacerbate relapses and triggers and in 90 days      Objectives:   i. 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 3) Client will explore and resolve ambivalence associated with commitment to change behaviors related to substance use and addiction in 90 days. Evaluation Date: 7/27/2022 Code: C Continue TBD    Defer: Client wants to be reunited with his girlfriend. Discharge Plan/Instructions: Client will remain abstinent from all mood altering substances and complete his treatment plan. Ivanna Odonnell / 1975 has participated in the treatment plan development outlined above on 5/24/2022.      Johana Rosado, LCDCIII  5/24/2022/9:32 AM

## 2022-05-25 ENCOUNTER — APPOINTMENT (OUTPATIENT)
Dept: PSYCHIATRY | Age: 47
End: 2022-05-25
Payer: MEDICARE

## 2022-05-25 NOTE — GROUP NOTE
612 [facility] Group Therapy Note      5/25/2022    Location:  [facility]    Clients Presents:  0848, 1236, 1209, 1231, 1208, 1178, 1203     Clients Absent:     Length of session: 1.5 hours    Group Note: OP    Group Type: Co-Ed    New members were welcomed and introduced. Norms and expectations of group were discussed. Content: Understanding the Healing Process: Dysfunctional Familles and Substance Use          KELVIN Tobar  4/30/2777 9:21 PM      Co-Therapist: N/A      [facility] Individual Group Progress Note    Deadra Cord  1975 5/25/2022    Notes on Client Progress in [address] attended group, introduced himself and events leading to his arrival: presented check information: indicated his use impacted his immediate family and children: still tends to justify.        TONY Tobar-CARLOS  7/81/9383 6:49 PM       Co-Therapist: N/A

## 2022-05-31 ENCOUNTER — HOSPITAL ENCOUNTER (OUTPATIENT)
Dept: PSYCHIATRY | Age: 47
Setting detail: THERAPIES SERIES
Discharge: HOME | End: 2022-05-31
Payer: MEDICARE

## 2022-05-31 PROCEDURE — 90834 PSYTX W PT 45 MINUTES: CPT

## 2022-05-31 PROCEDURE — 90853 GROUP PSYCHOTHERAPY: CPT

## 2022-05-31 NOTE — PROGRESS NOTES
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 Problem: History of Substance use resulting in drug charges. a. Goal: Client will enhance personalized knowledge and insight associated with mood altering substances in 90 days   b. Objectives:   i. 1) Client will remind self of detrimental consequences in major life areas regarding AoD use in 90 days Evaluation Date: 7/27/2022 Code: Achieved 5/4/2022 Client reports when he drank it affected his relationships and his kids. His Meth use      ii. 2) Client will identify 4 to 8 benefits and gratitudes due to remaining substance free in 90 days: Evaluation Date: 7/27/2022 Code: C Continue TBD     iii. 3) Client will identify 4 relapse triggers, define relapse, difference between internal and external triggers associated with substance use in 90 days and Evaluation Date: 7/27/2022 Code: Continue 5/17/2022 Client has identified boredom and being tired as a trigger. 2.    Problem: Low Self-Esteem/Identify issues as a result of self-medicating. a. Goal: Client will learn to focus on character strengths and feel better about himself in 90 days      Objectives:   i. 1) Client will journal 3-5 times weekly identifying thoughts and feelings and share in his individual sessions in 90 days:  Evaluation Date: 7/27/2022 Code: Continue 5/24/2022 Client reports feeling very sore from working. His emotional health is good and spiritual very good. ii. 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       iii. 3)  Utilize, if needed case management services provided by [facility] to enhance abstaining from substance use Evaluation Date: 7/27/2022 Code: C Continue TBD         3. Problem: History of Relapse due to lack of sober support. a. Goal: Identify and address the core dynamics and dilemmas that are perpetuating consequences and exacerbate relapses and triggers and in 90 days      Objectives:   i. 1)  Client will attend 1-2 AA/NA in person or online meetings Biweekly or [employer]ist to avoid relapse in 90 days Evaluation Date: 7/27/2022 Code: Continue 5/31/2022 Client attends [employer] twice a week. 2) Client will enhance 4 to 8 healthy techniques and coping skills, for relapse prevention in 90 days Evaluation Date: 7/27/2022 Code: Continue 5/11/2022 Client reports he is doing side work to keep busy and it is helping him feel better about himself. 3) Client will explore and resolve ambivalence associated with commitment to change behaviors related to substance use and addiction in 90 days. Evaluation Date: 7/27/2022 Code: C Continue TBD    Defer: Client wants to be reunited with his girlfriend. Discharge Plan/Instructions: Client will remain abstinent from all mood altering substances and complete his treatment plan. Jocelynn Jacobs / 1975 has participated in the treatment plan development outlined above on 5/31/2022.      ABDI HdzIII  5/31/2022/9:07 AM

## 2022-05-31 NOTE — PROGRESS NOTES
612 [facility]        Individual  Progress Note    Location: [x] [place] [facility]                   Patient Name: Haritha Schmitz   : 1975     Case # :  [id number]  Therapist: URVASHI Urbina        Objective/Service/Time: kept 1 hour individual     Goal 2 Objective 2 continue   Goal 3 Objective 1 continue    S-Client is a 55year old  male on probation. Client denies any use or cravings. He shared he had a good weekend with family. Client shared he has been engaging in [employer] twice a week for sober support. Client stated, \"I called my dad on  after [employer] and asked if he was planning on going out to decorate the graves? He said he hadn't gone out yet? I thought, what are you waiting for? So I just went and decorated them myself\". Client shared on his relationship with his father and how distant they are. Client stated, \"I think if I were to get \"the call\" I would be okay\". Client's eyes were teary. 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 Client denies any current obstacles for his recovery. O-Client was cooperative, his affect inappropriate when sharing about his father. Client was oriented x 4. A-Client has good insight into his AoD use and consequences. He has good support and meets 2 times a week. He talks with support daily. Client is looking for a part time job. Counselor encouraged 12 step AA meetings just for exposure to more sober support. P-Continue services.         Electronically signed by Keren Maxwell on 2022 at 9:54 AM

## 2022-06-01 ENCOUNTER — APPOINTMENT (OUTPATIENT)
Dept: PSYCHIATRY | Age: 47
End: 2022-06-01
Payer: MEDICARE

## 2022-06-01 NOTE — GROUP NOTE
612 [facility] Group Therapy Note      6/1/2022    Location:  [place]    Clients Presents: 5623, 1236, 1209, 1231, 1208, 1178, 1203    Clients Absent:     Length of session: 1.5 hours    Group Note: OP    Group Type: Co-Ed    New members were welcomed and introduced. Norms and expectations of group were discussed. Content: Understanding Internal and External Triggers: Relapse      KELVIN Cortes  4/1/1958 30:01 AM      Co-Therapist: N/A      [facility] Individual Group Progress Note    Christina Mabry  1975 6/1/2022    Notes on Client Progress in [address] [facility] attended group, introduced himself and events leading to arrival: presented check in information: internally identified nervous and overwhelmed: externally: when historically using methamphetamine would not sleep for days.          KELVIN Cortes  8/6/1895 42:34 AM         Co-Therapist: N/A

## 2022-06-06 ENCOUNTER — OFFICE VISIT (OUTPATIENT)
Dept: FAMILY MEDICINE CLINIC | Age: 47
End: 2022-06-06
Payer: MEDICARE

## 2022-06-06 VITALS
RESPIRATION RATE: 18 BRPM | HEART RATE: 83 BPM | SYSTOLIC BLOOD PRESSURE: 156 MMHG | OXYGEN SATURATION: 95 % | TEMPERATURE: 97.8 F | WEIGHT: 274.4 LBS | BODY MASS INDEX: 38.27 KG/M2 | DIASTOLIC BLOOD PRESSURE: 96 MMHG

## 2022-06-06 DIAGNOSIS — Z11.59 ENCOUNTER FOR HCV SCREENING TEST FOR HIGH RISK PATIENT: ICD-10-CM

## 2022-06-06 DIAGNOSIS — Z11.4 SCREENING FOR HIV (HUMAN IMMUNODEFICIENCY VIRUS): ICD-10-CM

## 2022-06-06 DIAGNOSIS — I10 PRIMARY HYPERTENSION: ICD-10-CM

## 2022-06-06 DIAGNOSIS — J45.40 MODERATE PERSISTENT ASTHMA WITHOUT COMPLICATION: ICD-10-CM

## 2022-06-06 DIAGNOSIS — Z13.29 SCREENING FOR THYROID DISORDER: ICD-10-CM

## 2022-06-06 DIAGNOSIS — Z76.89 ENCOUNTER TO ESTABLISH CARE: Primary | ICD-10-CM

## 2022-06-06 DIAGNOSIS — Z91.89 ENCOUNTER FOR HCV SCREENING TEST FOR HIGH RISK PATIENT: ICD-10-CM

## 2022-06-06 DIAGNOSIS — M79.601 RIGHT ARM PAIN: ICD-10-CM

## 2022-06-06 DIAGNOSIS — E66.01 SEVERE OBESITY (BMI 35.0-39.9) WITH COMORBIDITY (HCC): ICD-10-CM

## 2022-06-06 DIAGNOSIS — F90.9 HYPERACTIVITY (BEHAVIOR): ICD-10-CM

## 2022-06-06 LAB
A/G RATIO: 1.7 (ref 1.1–2.2)
ALBUMIN SERPL-MCNC: 4.8 G/DL (ref 3.4–5)
ALP BLD-CCNC: 87 U/L (ref 40–129)
ALT SERPL-CCNC: 26 U/L (ref 10–40)
ANION GAP SERPL CALCULATED.3IONS-SCNC: 18 MMOL/L (ref 3–16)
AST SERPL-CCNC: 17 U/L (ref 15–37)
BASOPHILS ABSOLUTE: 0.1 K/UL (ref 0–0.2)
BASOPHILS RELATIVE PERCENT: 0.9 %
BILIRUB SERPL-MCNC: 0.3 MG/DL (ref 0–1)
BUN BLDV-MCNC: 14 MG/DL (ref 7–20)
CALCIUM SERPL-MCNC: 9.4 MG/DL (ref 8.3–10.6)
CHLORIDE BLD-SCNC: 102 MMOL/L (ref 99–110)
CHOLESTEROL, TOTAL: 224 MG/DL (ref 0–199)
CO2: 22 MMOL/L (ref 21–32)
CREAT SERPL-MCNC: 0.6 MG/DL (ref 0.9–1.3)
EOSINOPHILS ABSOLUTE: 0.3 K/UL (ref 0–0.6)
EOSINOPHILS RELATIVE PERCENT: 4 %
GFR AFRICAN AMERICAN: >60
GFR NON-AFRICAN AMERICAN: >60
GLUCOSE BLD-MCNC: 99 MG/DL (ref 70–99)
HCT VFR BLD CALC: 46.1 % (ref 40.5–52.5)
HDLC SERPL-MCNC: 56 MG/DL (ref 40–60)
HEMOGLOBIN: 15.7 G/DL (ref 13.5–17.5)
HEPATITIS C ANTIBODY INTERPRETATION: NORMAL
LDL CHOLESTEROL CALCULATED: 156 MG/DL
LYMPHOCYTES ABSOLUTE: 2.1 K/UL (ref 1–5.1)
LYMPHOCYTES RELATIVE PERCENT: 33.9 %
MCH RBC QN AUTO: 30.6 PG (ref 26–34)
MCHC RBC AUTO-ENTMCNC: 34 G/DL (ref 31–36)
MCV RBC AUTO: 90.2 FL (ref 80–100)
MONOCYTES ABSOLUTE: 0.6 K/UL (ref 0–1.3)
MONOCYTES RELATIVE PERCENT: 9.6 %
NEUTROPHILS ABSOLUTE: 3.2 K/UL (ref 1.7–7.7)
NEUTROPHILS RELATIVE PERCENT: 51.6 %
PDW BLD-RTO: 13.3 % (ref 12.4–15.4)
PLATELET # BLD: 273 K/UL (ref 135–450)
PMV BLD AUTO: 8.1 FL (ref 5–10.5)
POTASSIUM SERPL-SCNC: 4.4 MMOL/L (ref 3.5–5.1)
RBC # BLD: 5.12 M/UL (ref 4.2–5.9)
SODIUM BLD-SCNC: 142 MMOL/L (ref 136–145)
T4 FREE: 1.2 NG/DL (ref 0.9–1.8)
TOTAL PROTEIN: 7.7 G/DL (ref 6.4–8.2)
TRIGL SERPL-MCNC: 60 MG/DL (ref 0–150)
TSH SERPL DL<=0.05 MIU/L-ACNC: 0.69 UIU/ML (ref 0.27–4.2)
VLDLC SERPL CALC-MCNC: 12 MG/DL
WBC # BLD: 6.3 K/UL (ref 4–11)

## 2022-06-06 PROCEDURE — G8417 CALC BMI ABV UP PARAM F/U: HCPCS | Performed by: NURSE PRACTITIONER

## 2022-06-06 PROCEDURE — 36415 COLL VENOUS BLD VENIPUNCTURE: CPT | Performed by: NURSE PRACTITIONER

## 2022-06-06 PROCEDURE — G8427 DOCREV CUR MEDS BY ELIG CLIN: HCPCS | Performed by: NURSE PRACTITIONER

## 2022-06-06 PROCEDURE — 4004F PT TOBACCO SCREEN RCVD TLK: CPT | Performed by: NURSE PRACTITIONER

## 2022-06-06 PROCEDURE — 99213 OFFICE O/P EST LOW 20 MIN: CPT | Performed by: NURSE PRACTITIONER

## 2022-06-06 RX ORDER — VALACYCLOVIR HYDROCHLORIDE 1 G/1
1000 TABLET, FILM COATED ORAL 2 TIMES DAILY
COMMUNITY

## 2022-06-06 RX ORDER — ALBUTEROL SULFATE 90 UG/1
2 AEROSOL, METERED RESPIRATORY (INHALATION) EVERY 6 HOURS PRN
Qty: 1 EACH | Refills: 5 | Status: SHIPPED | OUTPATIENT
Start: 2022-06-06

## 2022-06-06 RX ORDER — LISINOPRIL 10 MG/1
10 TABLET ORAL DAILY
Qty: 30 TABLET | Refills: 0 | Status: SHIPPED | OUTPATIENT
Start: 2022-06-06 | End: 2022-09-08 | Stop reason: SDUPTHER

## 2022-06-06 ASSESSMENT — PATIENT HEALTH QUESTIONNAIRE - PHQ9
SUM OF ALL RESPONSES TO PHQ QUESTIONS 1-9: 6
8. MOVING OR SPEAKING SO SLOWLY THAT OTHER PEOPLE COULD HAVE NOTICED. OR THE OPPOSITE, BEING SO FIGETY OR RESTLESS THAT YOU HAVE BEEN MOVING AROUND A LOT MORE THAN USUAL: 0
3. TROUBLE FALLING OR STAYING ASLEEP: 0
SUM OF ALL RESPONSES TO PHQ9 QUESTIONS 1 & 2: 2
4. FEELING TIRED OR HAVING LITTLE ENERGY: 1
1. LITTLE INTEREST OR PLEASURE IN DOING THINGS: 1
9. THOUGHTS THAT YOU WOULD BE BETTER OFF DEAD, OR OF HURTING YOURSELF: 0
2. FEELING DOWN, DEPRESSED OR HOPELESS: 1
SUM OF ALL RESPONSES TO PHQ QUESTIONS 1-9: 6
10. IF YOU CHECKED OFF ANY PROBLEMS, HOW DIFFICULT HAVE THESE PROBLEMS MADE IT FOR YOU TO DO YOUR WORK, TAKE CARE OF THINGS AT HOME, OR GET ALONG WITH OTHER PEOPLE: 1
6. FEELING BAD ABOUT YOURSELF - OR THAT YOU ARE A FAILURE OR HAVE LET YOURSELF OR YOUR FAMILY DOWN: 0
5. POOR APPETITE OR OVEREATING: 0
SUM OF ALL RESPONSES TO PHQ QUESTIONS 1-9: 6
7. TROUBLE CONCENTRATING ON THINGS, SUCH AS READING THE NEWSPAPER OR WATCHING TELEVISION: 3
SUM OF ALL RESPONSES TO PHQ QUESTIONS 1-9: 6

## 2022-06-06 ASSESSMENT — ANXIETY QUESTIONNAIRES
7. FEELING AFRAID AS IF SOMETHING AWFUL MIGHT HAPPEN: 0
GAD7 TOTAL SCORE: 3
2. NOT BEING ABLE TO STOP OR CONTROL WORRYING: 0
6. BECOMING EASILY ANNOYED OR IRRITABLE: 0
5. BEING SO RESTLESS THAT IT IS HARD TO SIT STILL: 3
1. FEELING NERVOUS, ANXIOUS, OR ON EDGE: 0
IF YOU CHECKED OFF ANY PROBLEMS ON THIS QUESTIONNAIRE, HOW DIFFICULT HAVE THESE PROBLEMS MADE IT FOR YOU TO DO YOUR WORK, TAKE CARE OF THINGS AT HOME, OR GET ALONG WITH OTHER PEOPLE: SOMEWHAT DIFFICULT
4. TROUBLE RELAXING: 0
3. WORRYING TOO MUCH ABOUT DIFFERENT THINGS: 0
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
## 2022-06-07 ENCOUNTER — HOSPITAL ENCOUNTER (OUTPATIENT)
Dept: PSYCHIATRY | Age: 47
Setting detail: THERAPIES SERIES
Discharge: HOME | End: 2022-06-07
Payer: MEDICARE

## 2022-06-07 LAB
HIV AG/AB: NORMAL
HIV ANTIGEN: NORMAL
HIV-1 ANTIBODY: NORMAL
HIV-2 AB: NORMAL

## 2022-06-07 PROCEDURE — 90853 GROUP PSYCHOTHERAPY: CPT

## 2022-06-07 PROCEDURE — 90834 PSYTX W PT 45 MINUTES: CPT

## 2022-06-07 PROCEDURE — 80305 DRUG TEST PRSMV DIR OPT OBS: CPT

## 2022-06-07 NOTE — PROGRESS NOTES
[address] [facility]        Individual  Progress Note    Location: [x] [place] [] [facility]                   Patient Name: Elba Savage   : 1975     Case # :  [id number]  Therapist: Dennie Cotta, LCDCIII        Objective/Service/Time: Kept 1 hour individual       Goal 2 Objective 3 continue  Goal 3 Objective 1 continue    S-Client is a 55year old  male on probation. Client denies any use or cravings. He shared he doesn't feel good at all today. He reports he has a headache and his body hurts all over. Client stated, \"I have been working very hard and my body is sore. I have a headache too. I went and got some [employer] before getting here but I still don't feel good\". 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 Client shared he is still working on his GED. He is also in \"thinking for a change\" and reports he is frustrated with the teacher for not explaining homework. Client stated, \"I don't understand so I just shut down\". Client understands it is a defense mechanism. Client has a plan to ask for help Wednesday. O-Client was cooperative, pleasant and oriented x 4. He reports not feeling physically well today. A-Client has good insight into his AoD use and triggers. He has good support and uses it daily. He is engaged in Taoist and bible studies. Counselor encouraged 12 step meetings. Client signed an BRYSON for Rodrick SARGENT. Medical practice. P-Continue services.          Electronically signed by Angelina Bernardo on 2022 at 9:48 AM

## 2022-06-07 NOTE — PROGRESS NOTES
[address] [facility] TREATMENT PLAN      Location: [x] [place] [] [facility]    Treatment plan: Initial    Strengths: humor, spirituality, caring    Weakness/Limitations: TBI    Service/Frequency/Duration: Individual 1 a week for 90 days, Group assigned 1 a week for 90 days, Urinalysis random for 90 days, AA/NA 1-2 biweekly for 90 days and Case Management as needed for 90 days    Diagnosis: F10.20 Other and unspecified alcohol dependence/unspecified drinking behavior and F15.20 Amphetamine and other psychostimulant dependence-unspecified use    Level of Care: 1 Outpatient Services    1. Problem: History of Substance use resulting in drug charges. a. Goal: Client will enhance personalized knowledge and insight associated with mood altering substances in 90 days   b. Objectives:   i. 1) Client will remind self of detrimental consequences in major life areas regarding AoD use in 90 days Evaluation Date: 7/27/2022 Code: Achieved 5/4/2022 Client reports when he drank it affected his relationships and his kids. His Meth use      ii. 2) Client will identify 4 to 8 benefits and gratitudes due to remaining substance free in 90 days: Evaluation Date: 7/27/2022 Code: C Continue TBD     iii. 3) Client will identify 4 relapse triggers, define relapse, difference between internal and external triggers associated with substance use in 90 days and Evaluation Date: 7/27/2022 Code: Continue 5/17/2022 Client has identified boredom and being tired as a trigger. 2.    Problem: Low Self-Esteem/Identify issues as a result of self-medicating. a. Goal: Client will learn to focus on character strengths and feel better about himself in 90 days      Objectives:   i. 1) Client will journal 3-5 times weekly identifying thoughts and feelings and share in his individual sessions in 90 days:  Evaluation Date: 7/27/2022 Code: Continue 5/24/2022 Client reports feeling very sore from working. His emotional health is good and spiritual very good. ii. 2) Client will explore new outdoor/indoor activities that bring him alexy in 90 days Evaluation Date:7/27/2022 Code: Continue 6/7/2022 Client is still working on [employer] and doing a lot of side jobs.       iii. 3)  Utilize, if needed case management services provided by [facility] to enhance abstaining from substance use Evaluation Date: 7/27/2022 Code: C Continue TBD         3. Problem: History of Relapse due to lack of sober support. a. Goal: Identify and address the core dynamics and dilemmas that are perpetuating consequences and exacerbate relapses and triggers and in 90 days      Objectives:   i. 1)  Client will attend 1-2 AA/NA in person or online meetings Biweekly or [employer] to avoid relapse in 90 days Evaluation Date: 7/27/2022 Code: Continue 6/7/2022 Client attends [employer] twice a week. 2) Client will enhance 4 to 8 healthy techniques and coping skills, for relapse prevention in 90 days Evaluation Date: 7/27/2022 Code: Continue 5/11/2022 Client reports he is doing side work to keep busy and it is helping him feel better about himself. 3) Client will explore and resolve ambivalence associated with commitment to change behaviors related to substance use and addiction in 90 days. Evaluation Date: 7/27/2022 Code: C Continue TBD    Defer: Client wants to be reunited with his girlfriend. Discharge Plan/Instructions: Client will remain abstinent from all mood altering substances and complete his treatment plan. Padmaja Reeves / 1975 has participated in the treatment plan development outlined above on 6/7/2022.      Tita Juárez, [address] [facility] [address]  6/7/2022/9:00 AM

## 2022-06-07 NOTE — GROUP NOTE
612 [facility] Group Therapy Note      6/7/2022    Location:  [employer]      Clients Presents: [id number], [phone], 1227    Clients Absent: 1209, 1178     Length of session: 1.5 hours    Group Note: OP    Group Type: Co-Ed    New members were welcomed and introduced. Norms and expectations of group were discussed. Content: Understanding Anger Management: Substance Use: Relapse       KELVIN Tineo  4/3/1107 9:14 PM      Co-Therapist: N/A      [facility] Individual Group Progress Note    Bernice Rodríguez  1975 6/7/2022    Notes on Client Progress in [address] attended group, introduced himself and events leading to his arrival; presented check in information: identified relationship stressor.           KELVIN Tineo  1/8/9524 4:62 PM       Co-Therapist: N/A

## 2022-06-08 ENCOUNTER — APPOINTMENT (OUTPATIENT)
Dept: PSYCHIATRY | Age: 47
End: 2022-06-08
Payer: MEDICARE

## 2022-06-13 PROBLEM — F90.9 HYPERACTIVITY (BEHAVIOR): Status: ACTIVE | Noted: 2022-06-13

## 2022-06-13 ASSESSMENT — ENCOUNTER SYMPTOMS
COUGH: 0
VOMITING: 0
CONSTIPATION: 0
CHEST TIGHTNESS: 1
SORE THROAT: 0
ABDOMINAL PAIN: 0
DIARRHEA: 0
NAUSEA: 0
WHEEZING: 1
STRIDOR: 0
SHORTNESS OF BREATH: 1

## 2022-06-14 ENCOUNTER — HOSPITAL ENCOUNTER (OUTPATIENT)
Dept: PSYCHIATRY | Age: 47
Setting detail: THERAPIES SERIES
Discharge: HOME | End: 2022-06-14
Payer: MEDICARE

## 2022-06-14 PROCEDURE — 90834 PSYTX W PT 45 MINUTES: CPT

## 2022-06-14 PROCEDURE — 90853 GROUP PSYCHOTHERAPY: CPT

## 2022-06-14 NOTE — PROGRESS NOTES
[address] [facility] TREATMENT PLAN      Location: [x] [place] [] [facility]    Treatment plan: Initial    Strengths: humor, spirituality, caring    Weakness/Limitations: TBI    Service/Frequency/Duration: Individual 1 a week for 90 days, Group assigned 1 a week for 90 days, Urinalysis random for 90 days, AA/NA 1-2 biweekly for 90 days and Case Management as needed for 90 days    Diagnosis: F10.20 Other and unspecified alcohol dependence/unspecified drinking behavior and F15.20 Amphetamine and other psychostimulant dependence-unspecified use    Level of Care: 1 Outpatient Services    1. Problem: History of Substance use resulting in drug charges. a. Goal: Client will enhance personalized knowledge and insight associated with mood altering substances in 90 days   b. Objectives:   i. 1) Client will remind self of detrimental consequences in major life areas regarding AoD use in 90 days Evaluation Date: 7/27/2022 Code: Achieved 5/4/2022 Client reports when he drank it affected his relationships and his kids. His Meth use      ii. 2) Client will identify 4 to 8 benefits and gratitudes due to remaining substance free in 90 days: Evaluation Date: 7/27/2022 Code: Continue 6/14/2022 Client reports he is grateful for his GF and her decision to move into transitional housing to aid her recovery.      iii. 3) Client will identify 4 relapse triggers, define relapse, difference between internal and external triggers associated with substance use in 90 days and Evaluation Date: 7/27/2022 Code: Continue 5/17/2022 Client has identified boredom and being tired as a trigger. 2.    Problem: Low Self-Esteem/Identify issues as a result of self-medicating. a. Goal: Client will learn to focus on character strengths and feel better about himself in 90 days      Objectives:   i.  1) Client will journal 3-5 times weekly identifying thoughts and feelings and share in his individual sessions in 90 days:  Evaluation Date: 7/27/2022 Code:

## 2022-06-14 NOTE — GROUP NOTE
612 [facility] Group Therapy Note      6/14/2022    Location:  [facility]      Clients Presents: [id number], [phone], 1227    Clients Absent: 1236, 1178    Length of session: 1.5 hours    Group Note: OP    Group Type: Co-Ed    New members were welcomed and introduced. Norms and expectations of group were discussed. Content: Understanding Stages of Grief and Correlation to Substance Use       KELVIN Jarvis  7/84/0304 79:97 PM      Co-Therapist: N/A      [facility] Individual Group Progress Note    Jocelynn Jacobs  1975 6/14/2022    Notes on Client Progress in [address] [facility] attended group, introduced himself and events leading to arrival: presented check in information: identified primary stressor is financial: discussed the reverse impact of using methamphetamine impacted him due to having Attention Deficit Disorder.          KELVIN Jarvis  8/29/7028 83:43 PM       Co-Therapist: N/A

## 2022-06-14 NOTE — PROGRESS NOTES
[address] [facility]        Individual  Progress Note    Location: [x] [place] [] [facility]                   Patient Name: Nicole Pham   : 1975     Case # :  [id number]  Therapist: URVASHI Lai        Objective/Service/Time: Kept 1 hour individual     Goal 1 Objective 2 continue  Goal 2 Objective 2 achieved     S-Client is a 55year old  male on probation. Client denies any use or cravings. 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 Client stated, \"I am really happy about it. I get to help out the [employer] and I am getting my community service hours at the same time\". Client shared that his girlfriend has decided to stay in [facility] transisional housing. Client stated, \"I am so proud of her. I will be able to see her and spend time with her. I just want to her to be a healthy as she can be for her children and herself\". Client shared his mother has to go back for another surgery and he is a little worried but has been praying and that helps him. Client denies any current obstacles for his recovery. O-Client was pleasant, cooperative, and oriented x 4. He shared openly and honestly. A-Client has good insight into his AoD use and consequences. Client has great support and is very involved in his [employer] attending 2 times a week. Client is working side jobs for income. Client denies any current stressors and is participating in group and reports he finds it helpful. P-Client will continue services.       Electronically signed by Russ Jara on 2022 at 9:46 AM

## 2022-06-15 ENCOUNTER — APPOINTMENT (OUTPATIENT)
Dept: PSYCHIATRY | Age: 47
End: 2022-06-15
Payer: MEDICARE

## 2022-06-21 ENCOUNTER — HOSPITAL ENCOUNTER (OUTPATIENT)
Dept: PSYCHIATRY | Age: 47
Setting detail: THERAPIES SERIES
Discharge: HOME | End: 2022-06-21
Payer: MEDICARE

## 2022-06-21 PROCEDURE — 90834 PSYTX W PT 45 MINUTES: CPT

## 2022-06-21 PROCEDURE — 90853 GROUP PSYCHOTHERAPY: CPT

## 2022-06-21 NOTE — GROUP NOTE
612 [facility] Group Therapy Note      6/21/2022    Location:  [facility]    Clients Presents: [id number], [id number] 1236, 1231, 1227    Clients Absent: 1252    Length of session: 1.5 hours    Group Note: OP    Group Type: Co-Ed    New members were welcomed and introduced. Norms and expectations of group were discussed.       Content: Understanding Medical Aspects of Substance Use Disorders      KELVIN Khan  2/60/6981 2:68 PM      Co-Therapist: N/A      [facility] Individual Group Progress Note    Katya Khan  1975 6/21/2022    Notes on Client Progress in [address] attended group, introduced himself and events leading to his arrival: presented check in information: discussed his use reflected his ability to not sleep for multiple days:         KELVIN Khan  0/44/8535 3:66 PM         Co-Therapist: N/A

## 2022-06-21 NOTE — PROGRESS NOTES
[address] [facility]        Individual  Progress Note    Location: [x] [place] [] [facility]                   Patient Name: Kathryn Casey   : 1975     Case # :  [id number]  Therapist: URVASHI Wallace        Objective/Service/Time: Kept 1 hour individual     Goal 2 Objective 1 continue    S-Client is a 55year old  male on probation. Client denies any use or cravings. He shared he spent all day with his girlfriend yesterday since she got out of inpatient treatment and moved into transitional housing. Client stated, \"\"It was so good to see her and spend time with her. We have plans to support each other but she is going to stay in transitional for 2 years so no plans to shack up\". Client shared about his father's day stating, \"I spent the day with my son and I almost for got to call my dad. When I did I told him I loved him and he said it back\". Client got very emotional and his eyes filled with tears. Client shared on his relationship with his father and how his father was very hard on him. Client denies any current obstacles for recovery. O-Client was cooperative, pleasant and oriented x 4. A-Client has good insight into his AoD use and triggers. He has good support and attends [employer] 2 times a week. He is doing odd jobs for money. He got a disability check for his TBI. He is not interested in 12 step meetings although I continue to encourage them. P-Client will continue services.          Electronically signed by Corazon Bowman on 2022 at 10:06 AM

## 2022-06-21 NOTE — PROGRESS NOTES
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 Problem: History of Substance use resulting in drug charges. a. Goal: Client will enhance personalized knowledge and insight associated with mood altering substances in 90 days   b. Objectives:   i. 1) Client will remind self of detrimental consequences in major life areas regarding AoD use in 90 days Evaluation Date: 7/27/2022 Code: Achieved 5/4/2022 Client reports when he drank it affected his relationships and his kids. His Meth use      ii. 2) Client will identify 4 to 8 benefits and gratitudes due to remaining substance free in 90 days: Evaluation Date: 7/27/2022 Code: Continue 6/14/2022 Client reports he is grateful for his GF and her decision to move into transitional housing to aid her recovery.      iii. 3) Client will identify 4 relapse triggers, define relapse, difference between internal and external triggers associated with substance use in 90 days and Evaluation Date: 7/27/2022 Code: Continue 5/17/2022 Client has identified boredom and being tired as a trigger. 2.    Problem: Low Self-Esteem/Identify issues as a result of self-medicating. a. Goal: Client will learn to focus on character strengths and feel better about himself in 90 days      Objectives:   i.  1) Client will journal 3-5 times weekly identifying thoughts and feelings and share in his individual sessions in 90 days:  Evaluation Date: 7/27/2022 Code: Continue 6/21/2022  Client shared he spoke to his dad on father's day and he told him he loved him.      ii. 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        iii. 3)  Utilize, if needed case management services provided by [facility] to enhance abstaining from substance use Evaluation Date: 7/27/2022 Code: C Continue TBD         3. Problem: History of Relapse due to lack of sober support. a. Goal: Identify and address the core dynamics and dilemmas that are perpetuating consequences and exacerbate relapses and triggers and in 90 days      Objectives:   i. 1)  Client will attend 1-2 AA/NA in person or online meetings Biweekly or Caodaism to avoid relapse in 90 days Evaluation Date: 7/27/2022 Code: Continue 6/7/2022 Client attends Caoda[employer] twice a week. 2) Client will enhance 4 to 8 healthy techniques and coping skills, for relapse prevention in 90 days Evaluation Date: 7/27/2022 Code: Continue 5/11/2022 Client reports he is doing side work to keep busy and it is helping him feel better about himself. 3) Client will explore and resolve ambivalence associated with commitment to change behaviors related to substance use and addiction in 90 days. Evaluation Date: 7/27/2022 Code: C Continue TBD    Defer: Client wants to be reunited with his girlfriend. Discharge Plan/Instructions: Client will remain abstinent from all mood altering substances and complete his treatment plan. Sebastien Mejia / 1975 has participated in the treatment plan development outlined above on 6/21/2022.      Jose Rojo LCDCIII  6/21/2022/9:05 AM

## 2022-06-22 ENCOUNTER — APPOINTMENT (OUTPATIENT)
Dept: PSYCHIATRY | Age: 47
End: 2022-06-22
Payer: MEDICARE

## 2022-06-28 ENCOUNTER — HOSPITAL ENCOUNTER (OUTPATIENT)
Dept: PSYCHIATRY | Age: 47
Setting detail: THERAPIES SERIES
Discharge: HOME | End: 2022-06-28
Payer: MEDICARE

## 2022-06-28 PROCEDURE — 90853 GROUP PSYCHOTHERAPY: CPT

## 2022-06-28 PROCEDURE — 90834 PSYTX W PT 45 MINUTES: CPT

## 2022-06-28 NOTE — PROGRESS NOTES
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 Problem: History of Substance use resulting in drug charges. a. Goal: Client will enhance personalized knowledge and insight associated with mood altering substances in 90 days   b. Objectives:   i. 1) Client will remind self of detrimental consequences in major life areas regarding AoD use in 90 days Evaluation Date: 7/27/2022 Code: Achieved 5/4/2022 Client reports when he drank it affected his relationships and his kids. His Meth use      ii. 2) Client will identify 4 to 8 benefits and gratitudes due to remaining substance free in 90 days: Evaluation Date: 7/27/2022 Code: Continue 6/14/2022 Client reports he is grateful for his GF and her decision to move into transitional housing to aid her recovery.      iii. 3) Client will identify 4 relapse triggers, define relapse, difference between internal and external triggers associated with substance use in 90 days and Evaluation Date: 7/27/2022 Code: Continue 5/17/2022 Client has identified boredom and being tired as a trigger. 2.    Problem: Low Self-Esteem/Identify issues as a result of self-medicating. a. Goal: Client will learn to focus on character strengths and feel better about himself in 90 days      Objectives:   i.  1) Client will journal 3-5 times weekly identifying thoughts and feelings and share in his individual sessions in 90 days:  Evaluation Date: 7/27/2022 Code: Continue 6/21/2022  Client shared he spoke to his dad on father's day and he told him he loved him.      ii. 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        iii. 3)  Utilize, if needed case management services provided by [facility] to enhance abstaining from substance use Evaluation Date: 7/27/2022 Code: C Continue TBD         3. Problem: History of Relapse due to lack of sober support. a. Goal: Identify and address the core dynamics and dilemmas that are perpetuating consequences and exacerbate relapses and triggers and in 90 days      Objectives:   i. 1)  Client will attend 1-2 AA/NA in person or online meetings Biweekly or Yazidi to avoid relapse in 90 days Evaluation Date: 7/27/2022 Code: Continue 6/28/2022 Client attends Yazidi twice a week. 2) Client will enhance 4 to 8 healthy techniques and coping skills, for relapse prevention in 90 days Evaluation Date: 7/27/2022 Code: Continue 6/28/2022 Client reports he prays, he is looking for working, he helps out around the home daily and he is attending meetings online. 3) Client will explore and resolve ambivalence associated with commitment to change behaviors related to substance use and addiction in 90 days. Evaluation Date: 7/27/2022 Code: C Continue TBD    Defer: Client wants to be reunited with his girlfriend. Discharge Plan/Instructions: Client will remain abstinent from all mood altering substances and complete his treatment plan. Fidel Boswell / 1975 has participated in the treatment plan development outlined above on 6/28/2022.      Mary Curiel, [address]  6/28/2022/9:10 AM

## 2022-06-28 NOTE — PROGRESS NOTES
612 [facility]        Individual  Progress Note    Location: [x] [place] [] Deandre Price                   Patient Name: Sebastien Mejia   : 1975     Case # :  [id number]  Therapist: URVASHI Ruvalcaba        Objective/Service/Time: Kept 1 hour individual     Goal 3 Objective 1 and 2 continue    S-Client is a 55year old  male on probation. Client denies any use or cravings. He shared he is spending a lot of time with his girlfriend and helping his Aunt around her home. Client stated, \"I learned so much in group last week and I shared it with my Maurice [place] last week and when she gave me a urine test I looked at the cup and the cup was ! The date on it was 2020! I told her if this instant comes up positive for anything it is wrong and I will own [facility]\". He went on to tell me that when he went to care home for 10 days for testing positive before he did not use! He never knew to check for an expiration date. Client shared about a case in [place] where a man sued the state and won 2.2 million for a false positive on an  instant test where he went to care home for 30 days. Client shared he is utilizing his coping skills of prayer, looking for work and attending online [employer] meetings. O-Client was pleasant, cooperative and oriented x 4. He shared his thoughts and feelings openly. A-Client has good insight into his AoD use and consequences. He has good support with family and Buddhism. He is looking for work and is not attending meetings online. Counselor encouraged in person [employer] meetings. P-Continue services.            Electronically signed by Jose Flores on 2022 at 10:04 AM

## 2022-06-29 ENCOUNTER — APPOINTMENT (OUTPATIENT)
Dept: PSYCHIATRY | Age: 47
End: 2022-06-29
Payer: MEDICARE

## 2022-06-29 NOTE — GROUP NOTE
612 [facility] Group Therapy Note      6/29/2022    Location:  [employer]    Clients Presents: [id number], 9011, 1231, 1227    Clients Absent: 1236, 1252    Length of session: 1.5 hours    Group Note: OP    Group Type: Co-Ed    New members were welcomed and introduced. Norms and expectations of group were discussed. Content: Understanding Medical Pharmacology of Drugs and Alcohol       KELVIN Dye  9/90/8712 46:03 AM      Co-Therapist: N/A      [facility] Individual Group Progress Note    Ivanna Odonnell  1975 6/29/2022    Notes on Client Progress in [address] [facility] attended group, introduced himself and events leading to arrival: presented check in information: discussed the physical impact of smoking methamphetamine.          KELVIN Dye  6/56/1427 47:47 PM       Co-Therapist: N/A

## 2022-07-05 ENCOUNTER — HOSPITAL ENCOUNTER (OUTPATIENT)
Dept: PSYCHIATRY | Age: 47
Setting detail: THERAPIES SERIES
Discharge: HOME | End: 2022-07-05
Payer: MEDICARE

## 2022-07-05 PROCEDURE — 80305 DRUG TEST PRSMV DIR OPT OBS: CPT

## 2022-07-05 PROCEDURE — 90834 PSYTX W PT 45 MINUTES: CPT

## 2022-07-05 PROCEDURE — 90853 GROUP PSYCHOTHERAPY: CPT

## 2022-07-05 NOTE — PROGRESS NOTES
612 [facility]        Individual  Progress Note    Location: [x] [place] [] Gloria Winston                   Patient Name: Felicia Eason   : 1975     Case # :  [id number]  Therapist: URVASHI Mejia        Objective/Service/Time: Kept 1 hour individual     Goal 1 Objective 3 Achieved  Goal 3 Objective 1 continue    S-Client is a 55year old  male on probation/ Client denies any use or cravings. He shared he had a good Holiday weekend and got to spend time with his girlfriend. Client shared he is still working on completing his community service hours and needs to get with his  to paint the [employer]. He is still attending twice weekly. Client shared about his grandfather and how important of an infuelence he was on his life. 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 Client stated, \"I got up one morning and he said he wanted eggs and crackers so I made it for him. Then he said he wasn't feeling good so I took him to the hospital. He had a stroke. He aspirated on what I fed him for breakfast. I thought I killed him. I drank for some years after that. My mom said it wasn't my fault but you couldn't tell me that\". Client processed his feelings and got emotional AEB tears. O-Client was cooperative, pleasant and oriented x 4. A-Client has good insight into his AoD use and triggers. He has good support within his L[employer] day and family. He submitted to a UDS today. Client denies any current stressors and continues to be compliant with his [address] [place] [address] program in [place]. P-Continue services.          Electronically signed by Raphael Griffin on 2022 at 10:10 AM

## 2022-07-05 NOTE — PROGRESS NOTES
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 Problem: History of Substance use resulting in drug charges. a. Goal: Client will enhance personalized knowledge and insight associated with mood altering substances in 90 days   b. Objectives:   i. 1) Client will remind self of detrimental consequences in major life areas regarding AoD use in 90 days Evaluation Date: 2022 Code: Achieved 2022 Client reports when he drank it affected his relationships and his kids. His Meth use      ii. 2) Client will identify 4 to 8 benefits and gratitudes due to remaining substance free in 90 days: Evaluation Date: 2022 Code: Continue 2022 Client reports he is grateful for his GF and her decision to move into transitional housing to aid her recovery.      iii. 3) Client will identify 4 relapse triggers, define relapse, difference between internal and external triggers associated with substance use in 90 days and Evaluation Date: 2022 Code: Achieved 2022 Client has identified grief as a trigger after his grandfather  when he was 32, client blamed himself, Client denies any current triggers. 2.    Problem: Low Self-Esteem/Identify issues as a result of self-medicating. a. Goal: Client will learn to focus on character strengths and feel better about himself in 90 days      Objectives:   i.  1) Client will journal 3-5 times weekly identifying thoughts and feelings and share in his individual sessions in 90 days:  Evaluation Date: 7/27/2022 Code: Continue 6/21/2022  Client shared he spoke to his dad on father's day and he told him he loved him.      ii. 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        iii. 3)  Utilize, if needed case management services provided by [facility] to enhance abstaining from substance use Evaluation Date: 7/27/2022 Code: C Continue TBD         3. Problem: History of Relapse due to lack of sober support. a. Goal: Identify and address the core dynamics and dilemmas that are perpetuating consequences and exacerbate relapses and triggers and in 90 days      Objectives:   i. 1)  Client will attend 1-2 AA/NA in person or online meetings Biweekly or Lutheran to avoid relapse in 90 days Evaluation Date: 7/27/2022 Code: Continue 7/5/2022 Client attends Lutheran twice a week. 2) Client will enhance 4 to 8 healthy techniques and coping skills, for relapse prevention in 90 days Evaluation Date: 7/27/2022 Code: Continue 6/28/2022 Client reports he prays, he is looking for working, he helps out around the home daily and he is attending meetings online. 3) Client will explore and resolve ambivalence associated with commitment to change behaviors related to substance use and addiction in 90 days. Evaluation Date: 7/27/2022 Code: C Continue TBD    Defer: Client wants to be reunited with his girlfriend. Discharge Plan/Instructions: Client will remain abstinent from all mood altering substances and complete his treatment plan. Kellen Finnegan / 1975 has participated in the treatment plan development outlined above on 7/5/2022.      ADBI GómezIII  7/5/2022/9:04 AM

## 2022-07-05 NOTE — GROUP NOTE
612 [facility] Group Therapy Note      7/5/2022    Location:  [place]    Clients Presents: 6152, 1231, 4780, 1227    Clients Absent: 1236, 1252, 1248    Length of session: 1.5 hours    Group Note: OP    Group Type: Co-Ed    New members were welcomed and introduced. Norms and expectations of group were discussed. Content: Understanding Stage of Denial: Substance Use       KELVIN Koroma  0/1/2459 7:05 PM      Co-Therapist: N/A      [facility] Individual Group Progress Note    Ronaldo Berrios  1975 7/5/2022    Notes on Client Progress in [address] attended group, introduced himself and events leading to arrival: presented check in information: discussed the importance of being sober enhanced his ability to view the problem and consequences of using methamphetamine.          KELVIN Koroma  7/6/5887 3:60 PM       Co-Therapist: N/A

## 2022-07-06 ENCOUNTER — APPOINTMENT (OUTPATIENT)
Dept: PSYCHIATRY | Age: 47
End: 2022-07-06
Payer: MEDICARE

## 2022-07-12 ENCOUNTER — HOSPITAL ENCOUNTER (OUTPATIENT)
Dept: PSYCHIATRY | Age: 47
Setting detail: THERAPIES SERIES
Discharge: HOME | End: 2022-07-12
Payer: MEDICARE

## 2022-07-12 PROCEDURE — 90853 GROUP PSYCHOTHERAPY: CPT

## 2022-07-12 PROCEDURE — 90834 PSYTX W PT 45 MINUTES: CPT

## 2022-07-12 NOTE — PROGRESS NOTES
612 [facility]        Individual  Progress Note    Location: [x] [place] [] [facility]                   Patient Name: Elsa Dupont   : 1975     Case # :  [id number]  Therapist: ABDI RhoadesIII        Objective/Service/Time: Kept 1 hour individual     Goal 3 Objective 3 achieved    S-Client is a 55year old  male on probation. Client denies any use or cravings. He reports he had a great weekend with his girlfriend and they put up a trampoline. He shared he is loving recovery and stated, \"I don't have any cravings and I don't want to use ever again. I think about my old life and is not appealing. I am so happy today. God has blessed me and I couldn't be more happy. My Taoist life is great, my girlfriend is great, and my family is supportive\". Client denies any obstacles for his recovery but shared the only stressor is GED classes. Counselor challenged client to think differently about his GED. Client agreed to try stating, \"I know God has a plan and maybe getting my GED is part of it? \". O-Client was cooperative, pleasant and oriented x 4. A-Client has good insight into his AoD use and consequences. He understands his head injury causes memory loss and it sometimes is frustrating. He has good supportive family and Taoist. Client has been encouraged to utilize more coping skills other than prayer and meditation. (journaling, exercise, social support building, 12 step work, hobbies). P-Continue services.          Electronically signed by Nelsy Jovel on 2022 at 11:40 AM

## 2022-07-12 NOTE — PROGRESS NOTES
612 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 Problem: History of Substance use resulting in drug charges. a. Goal: Client will enhance personalized knowledge and insight associated with mood altering substances in 90 days   b. Objectives:   i. 1) Client will remind self of detrimental consequences in major life areas regarding AoD use in 90 days Evaluation Date: 2022 Code: Achieved 2022 Client reports when he drank it affected his relationships and his kids. His Meth use      ii. 2) Client will identify 4 to 8 benefits and gratitudes due to remaining substance free in 90 days: Evaluation Date: 2022 Code: Continue 2022 Client reports he is grateful for his GF and her decision to move into transitional housing to aid her recovery.      iii. 3) Client will identify 4 relapse triggers, define relapse, difference between internal and external triggers associated with substance use in 90 days and Evaluation Date: 2022 Code: Achieved 2022 Client has identified grief as a trigger after his grandfather  when he was 32, client blamed himself, Client denies any current triggers. 2.    Problem: Low Self-Esteem/Identify issues as a result of self-medicating. a. Goal: Client will learn to focus on character strengths and feel better about himself in 90 days      Objectives:   i.  1) Client will journal 3-5 times weekly identifying thoughts and feelings and share in his individual sessions in 90 days:  Evaluation Date: 7/27/2022 Code: Continue 6/21/2022  Client shared he spoke to his dad on father's day and he told him he loved him.      ii. 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        iii. 3)  Utilize, if needed case management services provided by [facility] to enhance abstaining from substance use Evaluation Date: 7/27/2022 Code: C Continue TBD         3. Problem: History of Relapse due to lack of sober support. a. Goal: Identify and address the core dynamics and dilemmas that are perpetuating consequences and exacerbate relapses and triggers and in 90 days      Objectives:   i. 1)  Client will attend 1-2 AA/NA in person or online meetings Biweekly or Pent[employer]ostal to avoid relapse in 90 days Evaluation Date: 7/27/2022 Code: Continue 7/5/2022 Client attends [employer] twice a week. 2) Client will enhance 4 to 8 healthy techniques and coping skills, for relapse prevention in 90 days Evaluation Date: 7/27/2022 Code: Continue 6/28/2022 Client reports he prays, he is looking for working, he helps out around the home daily and he is attending meetings online. 3) Client will explore and resolve ambivalence associated with commitment to change behaviors related to substance use and addiction in 90 days. Evaluation Date: 7/27/2022 Code: Achieved 7/12/2022 Client reports he is loving his life and remaining sober is easy and being a part of his family and Pentecostal means everything to him. Defer: Client wants to be reunited with his girlfriend. Discharge Plan/Instructions: Client will remain abstinent from all mood altering substances and complete his treatment plan. Vignesh West / 1975 has participated in the treatment plan development outlined above on 7/12/2022. Esperanza Nguyen, LCDCIII  7/12/2022/9:08 AM

## 2022-07-13 ENCOUNTER — APPOINTMENT (OUTPATIENT)
Dept: PSYCHIATRY | Age: 47
End: 2022-07-13
Payer: MEDICARE

## 2022-07-13 NOTE — GROUP NOTE
612 [facility] Group Therapy Note      7/13/2022    Location:  [place]      Clients Presents: 5165, 1236, 1231, 1248, 1227    Clients Absent:     Length of session: 1.5 hours    Group Note: OP    Group Type: Co-Ed    New members were welcomed and introduced. Norms and expectations of group were discussed. Content: Understanding DSM 5: Substance Use Disorder       KELVIN Brewer  4/48/2097 1:11 AM      Co-Therapist: N/A      [facility] Individual Group Progress Note    Payal Wilkerson  1975 7/13/2022    Notes on Client Progress in [address] [facility] [place] attended group, introduced himself and events leading to arrival: presented check in information: discussed improved insight concerning substance use, progression and consequences being sober.        KELVIN Brewer  9/43/4708 8:65 AM         Co-Therapist: N/A

## 2022-07-19 ENCOUNTER — HOSPITAL ENCOUNTER (OUTPATIENT)
Dept: PSYCHIATRY | Age: 47
Setting detail: THERAPIES SERIES
Discharge: HOME | End: 2022-07-19
Payer: MEDICARE

## 2022-07-19 PROCEDURE — 90834 PSYTX W PT 45 MINUTES: CPT

## 2022-07-19 PROCEDURE — 90853 GROUP PSYCHOTHERAPY: CPT

## 2022-07-19 PROCEDURE — 80305 DRUG TEST PRSMV DIR OPT OBS: CPT

## 2022-07-19 NOTE — PROGRESS NOTES
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 Goal: Client will enhance personalized knowledge and insight associated with mood altering substances in 90 days   Objectives:   1) Client will remind self of detrimental consequences in major life areas regarding AoD use in 90 days Evaluation Date: 2022 Code: Achieved 2022 Client reports when he drank it affected his relationships and his kids. His Meth use      2) Client will identify 4 to 8 benefits and gratitudes due to remaining substance free in 90 days: Evaluation Date: 2022 Code: Continue 2022 Client reports he is grateful for his GF and her decision to move into transitional housing to aid her recovery. 3) Client will identify 4 relapse triggers, define relapse, difference between internal and external triggers associated with substance use in 90 days and Evaluation Date: 2022 Code: Achieved 2022 Client has identified grief as a trigger after his grandfather  when he was 32, client blamed himself, Client denies any current triggers. 2.    Problem: Low Self-Esteem/Identify issues as a result of self-medicating.       Goal: Client will learn to focus on character strengths and feel better about himself in 90 days      Objectives:   1) Client will journal 3-5 times weekly identifying thoughts and feelings and share in his individual sessions in 90 days:  Evaluation Date: 7/27/2022 Code: Achieved 7/19/2022  Client shared he feels god about his life and relationships. He stated he is proud of himself at 152 days sober today. 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 3)  Utilize, if needed case management services provided by [facility] to enhance abstaining from substance use Evaluation Date: 7/27/2022 Code: C Continue TBD         3. Problem: History of Relapse due to lack of sober support. Goal: Identify and address the core dynamics and dilemmas that are perpetuating consequences and exacerbate relapses and triggers and in 90 days      Objectives:   1)  Client will attend 1-2 AA/NA in person or online meetings Biweekly or Anabaptism to avoid relapse in 90 days Evaluation Date: 7/27/2022 Code: Achieved 7/19/2022 Client attends Anabaptism twice a week. 2) Client will enhance 4 to 8 healthy techniques and coping skills, for relapse prevention in 90 days Evaluation Date: 7/27/2022 Code: Continue 6/28/2022 Client reports he prays, he is looking for working, he helps out around the home daily and he is attending meetings online. 3) Client will explore and resolve ambivalence associated with commitment to change behaviors related to substance use and addiction in 90 days. Evaluation Date: 7/27/2022 Code: Achieved 7/12/2022 Client reports he is loving his life and remaining sober is easy and being a part of his family and Anabaptism means everything to him. Defer: Client wants to be reunited with his girlfriend. Discharge Plan/Instructions: Client will remain abstinent from all mood altering substances and complete his treatment plan. Kishor Gandhi / 1975 has participated in the treatment plan development outlined above on 7/19/2022. Ketan Martinez, [address]  7/19/2022/9:04 AM

## 2022-07-19 NOTE — GROUP NOTE
612 [facility] Group Therapy Note      7/19/2022    Location:  [place]    Clients Presents: [phone] [phone] [id number] [id number], [id number], [phone]    Clients Absent:    Length of session: 1.5 hours    Group Note: OP    Group Type: Co-Ed    New members were welcomed and introduced. Norms and expectations of group were discussed. Content: Understanding Relapse: Internal and External Triggers: Substance Use       KELVIN Griffiths  9/60/8712 16:17 PM      Co-Therapist: N/A      [facility] Individual Group Progress Note    Kishor Gandhi  1975 7/19/2022    Notes on Client Progress in [address] attended group, introduced himself and events leading to his arrival: presented check in information: identified his struggle with previous guilt and shame and the importance of utilizing optimistic thoughts and coping skills.          KELVIN Griffiths  8/06/2363 80:80 PM      Co-Therapist: N/A

## 2022-07-19 NOTE — PROGRESS NOTES
[address] [facility]        Individual  Progress Note    Location: [x] [place] [] [place]                   Patient Name: Fiona Teague   : 1975     Case # :  [id number]  Therapist: URVASHI Copeland        Objective/Service/Time: Kept 1 hour individual     Goal 2 Objective 1 achieved    S-Client is a 55year old  male on probation. Client denies any use or cravings sharing he had an incounter at [place] stating, \"I was shopping with my girlfriend and a lady was hading out sample of a drink. I smelled and it smelled like grapefruit. I asked what it was and she said white something and it ended up being alcohol. I know we are old enough to drink but she just about sent us back to penitentiary! \" Client told the women they were in recovery and told her she should tell people it has alcohol in it before handing them a cup. Client shared how grateful his is for his recovery and his relationships today. He shared he is 152 days sober today. O-Client was cooperative, pleasant and oriented x 4. A-Client has good insight into his AoD use and has plenty of sober support within his family and [employer]. He has good coping skills. Client attends [employer] 2 times a week. He finished \"thinking for a change\" through the [employer] program. Client will graduate next week. He submitted to [facility] today. P-Continue services.          Electronically signed by Amie Barrera on 2022 at 9:44 AM

## 2022-07-20 ENCOUNTER — APPOINTMENT (OUTPATIENT)
Dept: PSYCHIATRY | Age: 47
End: 2022-07-20
Payer: MEDICARE

## 2022-07-26 ENCOUNTER — HOSPITAL ENCOUNTER (OUTPATIENT)
Dept: PSYCHIATRY | Age: 47
Setting detail: THERAPIES SERIES
Discharge: HOME | End: 2022-07-26
Payer: MEDICARE

## 2022-07-26 PROCEDURE — 90834 PSYTX W PT 45 MINUTES: CPT

## 2022-07-26 PROCEDURE — 90853 GROUP PSYCHOTHERAPY: CPT

## 2022-07-26 NOTE — PROGRESS NOTES
612 [facility]        Individual  Progress Note    Location: [x] [place] [] [place]                   Patient Name: Anastasiya Escalona   : 1975     Case # :  [id number]  Therapist: URVASHI Parker        Objective/Service/Time: kept 1 hour individual     Goal 1 Objective 2 achieved     S-Client is a 55year old  male on probation. Client denies any use or cravings. He shared he is happy to have completed treatment. He stated, 'I am ready to get on with life. I am doing my community service at [employer]. I am going to go to the [employer] with my girlfriend and I bought a tent and everything. I am excited about my life\". Client shared he is getting along with his dad and he is taking his girlfriend and her kids to the fair today. Client is grateful for everything. O-Client was cooperative, pleasant and oriented x 4. A-Client has good insight into his AoD use and consequences. He has good support and utilizes it daily. Client is engage in [employer] 2 times a week. Counselor continues to encourage him to get involved in [place] [id number]. P-Client is referred back to probation and encouraged to call [facility] if he needs help in the future.          Electronically signed by Colleen Atkins on 2022 at 9:57 AM

## 2022-07-26 NOTE — PROGRESS NOTES
Evaluation Date: 7/27/2022 Code: Achieved 7/19/2022  Client shared he feels god about his life and relationships. He stated he is proud of himself at 152 days sober today. 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 3)  Utilize, if needed case management services provided by [facility] to enhance abstaining from substance use Evaluation Date: 7/27/2022 Code: Discontinue 7/26/2022        3. Problem: History of Relapse due to lack of sober support. Goal: Identify and address the core dynamics and dilemmas that are perpetuating consequences and exacerbate relapses and triggers and in 90 days      Objectives:   1)  Client will attend 1-2 AA/NA in person or online meetings Biweekly or Confucianist to avoid relapse in 90 days Evaluation Date: 7/27/2022 Code: Achieved 7/19/2022 Client attends Confucianist twice a week. 2) Client will enhance 4 to 8 healthy techniques and coping skills, for relapse prevention in 90 days Evaluation Date: 7/27/2022 Code: Achieved 7/26/2022 Client reports he prays daily, stays away from old people, attends Confucianist weekly, engage in hobbies flys drones and plants flowers. 3) Client will explore and resolve ambivalence associated with commitment to change behaviors related to substance use and addiction in 90 days. Evaluation Date: 7/27/2022 Code: Achieved 7/12/2022 Client reports he is loving his life and remaining sober is easy and being a part of his family and Confucianist means everything to him. Defer: Client wants to be reunited with his girlfriend. Discharge Plan/Instructions: Client will remain abstinent from all mood altering substances and complete his treatment plan. La Flores / 1975 has participated in the treatment plan development outlined above on 7/26/2022.      Zahra Sotelo Izabela Mares  7/26/2022/9:05 AM

## 2022-07-26 NOTE — PROGRESS NOTES
612 [facility] Discharge Treatment Plan    Valdo Mcmillan  1975  Case # [id number]    Location: [x] [place] [] Khalida Peña. Stresses that I need to monitor  1. Finances   2. People who change plans last minute  3.    4.     B. Major triggers to using alcohol/drugs   1. None identified   2.    3.      Sober Plan   1. Continue Worship   2. Continue sober support   3.     C. Sobriety Support   1. FriendKimberley Guadalupe   2. Treatment staff:  Tinnie Meckel   3. Family member:  Dad, Brother, Mom, Step dad, Kids   4. AA/NA recovery program, sponsor, meetings day/times, daily ready: Taoist support     D. Non-using activities  1. Flying drones    2. Camping   3. Fishing     E. Consequences of Drug/Alcohol use  1. Letting people down  2. FDC  3. Losing everything I have     G. Short term goals to achieve  1. Get my GED   2. Get no contact order lifted     H. Follow up recommendations  1. Client is encouraged to continue with Worship weekly    2. Client is encouraged to call [facility] if he needs help in the future. Valdo Mcmillan / 1975 has participated in the discharge treatment plan development outlined above on 7/26/2022.      Benjy Annville, [address]  7/26/2022/9:22 AM

## 2022-07-26 NOTE — GROUP NOTE
612 [facility] Group Therapy Note      7/26/2022    Location:  [place]    Clients Presents: [phone], [phone], 1172, 1226, 1248    Clients Absent: [id number]    Length of session: 1.5 hours    Group Note: OP    Group Type: Co-Ed    New members were welcomed and introduced. Norms and expectations of group were discussed.       Content: Understanding the Correlation Substance Use and Anger Management: Coping Skills         KELVIN Fish  7/02/9059 6:68 PM        Co-Therapist: N/A      [facility] Individual Group Progress Note    Miroslava Peterson  1975 7/26/2022    Notes on Client Progress in [address] attended group, introduced herself and events leading to arrival: presented check in information: identified moral anger       KELVIN Fish  2/34/8652 3:49 PM      Co-Therapist: N/A

## 2022-08-01 ENCOUNTER — TELEPHONE (OUTPATIENT)
Dept: FAMILY MEDICINE CLINIC | Age: 47
End: 2022-08-01

## 2022-08-01 DIAGNOSIS — M79.601 RIGHT ARM PAIN: Primary | ICD-10-CM

## 2022-08-01 NOTE — TELEPHONE ENCOUNTER
----- Message from Nereida Zhu sent at 8/1/2022  1:32 PM EDT -----  Subject: Referral Request    Reason for referral request? MRI or Xray - right bicep  Provider patient wants to be referred to(if known): Tennis Pretty    Provider Phone Number(if known):     Additional Information for Provider? earlier in the day  ---------------------------------------------------------------------------  --------------  [address] [employer]    [phone]; OK to leave message on voicemail  ---------------------------------------------------------------------------  --------------

## 2022-08-05 ENCOUNTER — OFFICE VISIT (OUTPATIENT)
Dept: FAMILY MEDICINE CLINIC | Age: 47
End: 2022-08-05
Payer: MEDICARE

## 2022-08-05 ENCOUNTER — HOSPITAL ENCOUNTER (OUTPATIENT)
Age: 47
Setting detail: SPECIMEN
Discharge: HOME | End: 2022-08-05
Payer: MEDICARE

## 2022-08-05 VITALS
OXYGEN SATURATION: 97 % | HEART RATE: 95 BPM | WEIGHT: 294 LBS | RESPIRATION RATE: 12 BRPM | BODY MASS INDEX: 41.16 KG/M2 | HEIGHT: 71 IN | TEMPERATURE: 97.3 F

## 2022-08-05 DIAGNOSIS — J45.901 EXACERBATION OF ASTHMA, UNSPECIFIED ASTHMA SEVERITY, UNSPECIFIED WHETHER PERSISTENT: ICD-10-CM

## 2022-08-05 DIAGNOSIS — J06.9 URI WITH COUGH AND CONGESTION: Primary | ICD-10-CM

## 2022-08-05 PROCEDURE — U0003 INFECTIOUS AGENT DETECTION BY NUCLEIC ACID (DNA OR RNA); SEVERE ACUTE RESPIRATORY SYNDROME CORONAVIRUS 2 (SARS-COV-2) (CORONAVIRUS DISEASE [COVID-19]), AMPLIFIED PROBE TECHNIQUE, MAKING USE OF HIGH THROUGHPUT TECHNOLOGIES AS DESCRIBED BY CMS-2020-01-R: HCPCS

## 2022-08-05 PROCEDURE — G8417 CALC BMI ABV UP PARAM F/U: HCPCS | Performed by: PHYSICIAN ASSISTANT

## 2022-08-05 PROCEDURE — 99213 OFFICE O/P EST LOW 20 MIN: CPT | Performed by: PHYSICIAN ASSISTANT

## 2022-08-05 PROCEDURE — 36415 COLL VENOUS BLD VENIPUNCTURE: CPT

## 2022-08-05 PROCEDURE — 4004F PT TOBACCO SCREEN RCVD TLK: CPT | Performed by: PHYSICIAN ASSISTANT

## 2022-08-05 PROCEDURE — G8427 DOCREV CUR MEDS BY ELIG CLIN: HCPCS | Performed by: PHYSICIAN ASSISTANT

## 2022-08-05 PROCEDURE — U0005 INFEC AGEN DETEC AMPLI PROBE: HCPCS

## 2022-08-05 RX ORDER — AZITHROMYCIN 250 MG/1
TABLET, FILM COATED ORAL
Qty: 6 TABLET | Refills: 0 | Status: SHIPPED | OUTPATIENT
Start: 2022-08-05 | End: 2022-09-08 | Stop reason: ALTCHOICE

## 2022-08-05 RX ORDER — PREDNISONE 10 MG/1
TABLET ORAL
Qty: 45 TABLET | Refills: 0 | Status: SHIPPED | OUTPATIENT
Start: 2022-08-05 | End: 2022-09-08 | Stop reason: ALTCHOICE

## 2022-08-05 NOTE — PATIENT INSTRUCTIONS
Your COVID 19 test can take 1-5 days for the results to come back. We ask that you make a Mychart page and view your test results this way. If positive, please work on contact tracing. Increase fluids and rest  Saline nasal spray as needed for nasal congestion  Warm salt gargles as needed for throat discomfort  Monitor temperature twice a day  Tylenol as needed for fevers and/or discomfort. Big deep breaths periodically throughout the day  Regular Mucinex over the counter as needed for chest congestion  Z-Saul and prednisone as prescribed, take with food  Continue albuterol inhaler as needed  If symptoms worsen -Go to the ER. Follow up with your primary care provider      To Whom it May Concern:    William Carpenter was tested for COVID-19 8/5/2022. He/she must stay home until test results are back. If test is positive, isolate for a total of 5 days, starting from day 1 of symptom onset. After 5 days, if fever-free for 24 hours and there has been a gradual improvement in symptoms, may come out of isolation, but must consistently wear a mask when around other people for 5 additional days. (5 days isolation, 5 days mask-wearing). We do not recommend retesting as patients may continue to test positive for months even though no longer contagious. It is suggested you call [address] or [address] with any questions regarding isolation timeframe/return to work/school details.       Jalil Whitlock PA-C

## 2022-08-05 NOTE — PROGRESS NOTES
8/5/22  Clarisse Hernandez  1975    FLU/COVID-19 CLINIC EVALUATION    HPI SYMPTOMS:    Employer: Unemployed    [] Fevers  [] Chills  [x] Cough  [] Coughing up blood  [x] Chest Congestion  [x] Nasal Congestion  [x] Feeling short of breath  [] Sometimes  [x] Frequently  [] All the time  [] Chest pain  [x] Headaches  [x]Tolerable  [] Severe  [x] Sore throat  [x] Muscle aches  [] Nausea  [] Vomiting  []Unable to keep fluids down  [] Diarrhea  []Severe    [] OTHER SYMPTOMS:      Symptom Duration:   [] 1  [] 2   [x] 3   [] 4    [] 5   [] 6   [] 7   [] 8   [] 9   [] 10   [] 11   [] 12   [] 13   [] 14   [] Longer than 14 days    Symptom course:   [] Worsening     [x] Stable     [] Improving    RISK FACTORS:    [] Pregnant or possibly pregnant  [] Age over 61  [] Diabetes  [] Heart disease  [x] Asthma  [] COPD/Other chronic lung diseases  [] Active Cancer  [] On Chemotherapy  [] Taking oral steroids  [] History Lymphoma/Leukemia  [] Close contact with a lab confirmed COVID-19 patient within 14 days of symptom onset  [] History of travel from affected geographical areas within 14 days of symptom onset       VITALS:  There were no vitals filed for this visit. TESTS:    POCT FLU:  [] Positive     []Negative    ASSESSMENT:    [] Flu  [] Possible COVID-19  [] Strep    PLAN:    [] Discharge home with written instructions for:  [] Flu management  [] Possible COVID-19 infection with self-quarantine and management of symptoms  [] Follow-up with primary care physician or emergency department if worsens  [] Evaluation per physician/NP/PA in clinic  [] Sent to ER       An  electronic signature was used to authenticate this note.      --Hermann Woody LPN on 9/1/5357 at 6:46 PM
Nasal mucosa congested  Eyes:  conjunctiva normal, no discharge, no scleral icterus  Cardiovascular:  Normal heart rate, normal rhythm, no murmurs, gallops or rubs  Thorax & Lungs:  Normal breath sounds, no respiratory distress, no wheezing, no rales, no rhonchi. Patient used albuterol inhaler prior to exam  Skin:  Warm, dry, no erythema, no rash  Neurologic:  Alert & oriented   Psychiatric:  Affect normal, mood normal    ASSESSMENT & PLAN    Cruz Crisostomo was seen today for uri. Diagnoses and all orders for this visit:    URI with cough and congestion  -     COVID-19    Exacerbation of asthma, unspecified asthma severity, unspecified whether persistent  -     azithromycin (ZITHROMAX) 250 MG tablet; Use as directed  -     predniSONE (DELTASONE) 10 MG tablet; 5 tabs for 3 days, 4 tabs for 3 days, 3 tabs for 3 days, 2 tabs for 3 days, 1 tab for 3 days    COVID-19 test results pending  Increase fluids and rest  Saline nasal spray as needed for nasal congestion  Warm salt gargles as needed for throat discomfort  Monitor temperature twice a day  Tylenol as needed for fevers and/or discomfort. Big deep breaths periodically throughout the day  Regular Mucinex over the counter as needed for chest congestion  Z-Saul and prednisone as prescribed, take with food  Continue albuterol inhaler as needed  If symptoms worsen -Go to the ER. Follow up with your primary care provider       There are no discontinued medications. No follow-ups on file. Patient verbalizes understanding with the above plan and is in agreement. Patient will call with  questions or concerns. I did don appropriate PPE (including N95 face mask, protective safety glasses, face shield, gloves, and gown) as recommended by the health facility/national standard best practice, during my interaction with the patient. Please note that this chart was generated using dragon dictation software.   Although every effort was made to ensure the

## 2022-08-06 LAB
SARS-COV-2: DETECTED
SOURCE: ABNORMAL

## 2022-08-11 ENCOUNTER — TELEPHONE (OUTPATIENT)
Dept: FAMILY MEDICINE CLINIC | Age: 47
End: 2022-08-11

## 2022-08-11 RX ORDER — BENZONATATE 200 MG/1
200 CAPSULE ORAL 3 TIMES DAILY PRN
Qty: 30 CAPSULE | Refills: 0 | Status: SHIPPED | OUTPATIENT
Start: 2022-08-11 | End: 2022-09-08 | Stop reason: ALTCHOICE

## 2022-08-11 NOTE — TELEPHONE ENCOUNTER
Pt was seen in the clinic on Friday, 8/5/2022 and tested positive for covid. He states his cough has become very aggravating and would like to know if something can be sent in to the pharmacy for it or should he come in and be seen for an appt. Please advise.

## 2022-09-08 ENCOUNTER — OFFICE VISIT (OUTPATIENT)
Dept: FAMILY MEDICINE CLINIC | Age: 47
End: 2022-09-08
Payer: MEDICARE

## 2022-09-08 VITALS
OXYGEN SATURATION: 95 % | TEMPERATURE: 97.9 F | HEART RATE: 80 BPM | DIASTOLIC BLOOD PRESSURE: 98 MMHG | RESPIRATION RATE: 15 BRPM | SYSTOLIC BLOOD PRESSURE: 138 MMHG | WEIGHT: 292.4 LBS | BODY MASS INDEX: 40.78 KG/M2

## 2022-09-08 DIAGNOSIS — Z76.89 ENCOUNTER FOR PSYCHIATRIC ASSESSMENT: Primary | ICD-10-CM

## 2022-09-08 DIAGNOSIS — F41.1 GAD (GENERALIZED ANXIETY DISORDER): ICD-10-CM

## 2022-09-08 DIAGNOSIS — F33.0 MDD (MAJOR DEPRESSIVE DISORDER), RECURRENT EPISODE, MILD (HCC): ICD-10-CM

## 2022-09-08 DIAGNOSIS — I10 PRIMARY HYPERTENSION: ICD-10-CM

## 2022-09-08 DIAGNOSIS — G47.33 OSA (OBSTRUCTIVE SLEEP APNEA): ICD-10-CM

## 2022-09-08 DIAGNOSIS — F90.2 ATTENTION DEFICIT HYPERACTIVITY DISORDER (ADHD), COMBINED TYPE: ICD-10-CM

## 2022-09-08 LAB
ALCOHOL URINE: NORMAL
AMPHETAMINE SCREEN, URINE: NEGATIVE
BARBITURATE SCREEN, URINE: NEGATIVE
BENZODIAZEPINE SCREEN, URINE: NEGATIVE
BUPRENORPHINE URINE: NEGATIVE
COCAINE METABOLITE SCREEN URINE: NEGATIVE
FENTANYL SCREEN, URINE: NEGATIVE
GABAPENTIN SCREEN, URINE: NEGATIVE
MDMA URINE: NEGATIVE
METHADONE SCREEN, URINE: NEGATIVE
METHAMPHETAMINE, URINE: NEGATIVE
OPIATE SCREEN URINE: NEGATIVE
OXYCODONE SCREEN URINE: NEGATIVE
PHENCYCLIDINE SCREEN URINE: NEGATIVE
PROPOXYPHENE SCREEN, URINE: NEGATIVE
SYNTHETIC CANNABINOIDS(K2) SCREEN, URINE: NORMAL
THC SCREEN, URINE: NEGATIVE
TRAMADOL SCREEN URINE: NORMAL
TRICYCLIC ANTIDEPRESSANTS, UR: NEGATIVE

## 2022-09-08 PROCEDURE — 4004F PT TOBACCO SCREEN RCVD TLK: CPT | Performed by: NURSE PRACTITIONER

## 2022-09-08 PROCEDURE — 90792 PSYCH DIAG EVAL W/MED SRVCS: CPT | Performed by: NURSE PRACTITIONER

## 2022-09-08 PROCEDURE — 80305 DRUG TEST PRSMV DIR OPT OBS: CPT | Performed by: NURSE PRACTITIONER

## 2022-09-08 RX ORDER — LISINOPRIL 10 MG/1
10 TABLET ORAL DAILY
Qty: 30 TABLET | Refills: 5 | Status: SHIPPED | OUTPATIENT
Start: 2022-09-08

## 2022-09-08 RX ORDER — BUPROPION HYDROCHLORIDE 150 MG/1
150 TABLET, EXTENDED RELEASE ORAL 2 TIMES DAILY
Qty: 60 TABLET | Refills: 1 | Status: SHIPPED | OUTPATIENT
Start: 2022-09-08 | End: 2022-09-29 | Stop reason: DRUGHIGH

## 2022-09-08 SDOH — HEALTH STABILITY: PHYSICAL HEALTH: ON AVERAGE, HOW MANY DAYS PER WEEK DO YOU ENGAGE IN MODERATE TO STRENUOUS EXERCISE (LIKE A BRISK WALK)?: 0 DAYS

## 2022-09-08 SDOH — ECONOMIC STABILITY: TRANSPORTATION INSECURITY
IN THE PAST 12 MONTHS, HAS THE LACK OF TRANSPORTATION KEPT YOU FROM MEDICAL APPOINTMENTS OR FROM GETTING MEDICATIONS?: NO

## 2022-09-08 SDOH — ECONOMIC STABILITY: FOOD INSECURITY: WITHIN THE PAST 12 MONTHS, THE FOOD YOU BOUGHT JUST DIDN'T LAST AND YOU DIDN'T HAVE MONEY TO GET MORE.: NEVER TRUE

## 2022-09-08 SDOH — HEALTH STABILITY: PHYSICAL HEALTH: ON AVERAGE, HOW MANY MINUTES DO YOU ENGAGE IN EXERCISE AT THIS LEVEL?: 0 MIN

## 2022-09-08 SDOH — ECONOMIC STABILITY: FOOD INSECURITY: WITHIN THE PAST 12 MONTHS, YOU WORRIED THAT YOUR FOOD WOULD RUN OUT BEFORE YOU GOT MONEY TO BUY MORE.: NEVER TRUE

## 2022-09-08 SDOH — ECONOMIC STABILITY: INCOME INSECURITY: IN THE LAST 12 MONTHS, WAS THERE A TIME WHEN YOU WERE NOT ABLE TO PAY THE MORTGAGE OR RENT ON TIME?: NO

## 2022-09-08 SDOH — ECONOMIC STABILITY: TRANSPORTATION INSECURITY
IN THE PAST 12 MONTHS, HAS LACK OF TRANSPORTATION KEPT YOU FROM MEETINGS, WORK, OR FROM GETTING THINGS NEEDED FOR DAILY LIVING?: NO

## 2022-09-08 SDOH — ECONOMIC STABILITY: HOUSING INSECURITY
IN THE LAST 12 MONTHS, WAS THERE A TIME WHEN YOU DID NOT HAVE A STEADY PLACE TO SLEEP OR SLEPT IN A SHELTER (INCLUDING NOW)?: NO

## 2022-09-08 SDOH — ECONOMIC STABILITY: HOUSING INSECURITY: IN THE LAST 12 MONTHS, HOW MANY PLACES HAVE YOU LIVED?: 3

## 2022-09-08 ASSESSMENT — SOCIAL DETERMINANTS OF HEALTH (SDOH)
HOW HARD IS IT FOR YOU TO PAY FOR THE VERY BASICS LIKE FOOD, HOUSING, MEDICAL CARE, AND HEATING?: SOMEWHAT HARD
IN A TYPICAL WEEK, HOW MANY TIMES DO YOU TALK ON THE PHONE WITH FAMILY, FRIENDS, OR NEIGHBORS?: MORE THAN THREE TIMES A WEEK
WITHIN THE LAST YEAR, HAVE YOU BEEN KICKED, HIT, SLAPPED, OR OTHERWISE PHYSICALLY HURT BY YOUR PARTNER OR EX-PARTNER?: NO
HOW OFTEN DO YOU ATTEND CHURCH OR RELIGIOUS SERVICES?: MORE THAN 4 TIMES PER YEAR
WITHIN THE LAST YEAR, HAVE YOU BEEN HUMILIATED OR EMOTIONALLY ABUSED IN OTHER WAYS BY YOUR PARTNER OR EX-PARTNER?: NO
WITHIN THE LAST YEAR, HAVE YOU BEEN AFRAID OF YOUR PARTNER OR EX-PARTNER?: NO
HOW OFTEN DO YOU ATTENT MEETINGS OF THE CLUB OR ORGANIZATION YOU BELONG TO?: MORE THAN 4 TIMES PER YEAR
WITHIN THE LAST YEAR, HAVE TO BEEN RAPED OR FORCED TO HAVE ANY KIND OF SEXUAL ACTIVITY BY YOUR PARTNER OR EX-PARTNER?: NO
HOW OFTEN DO YOU GET TOGETHER WITH FRIENDS OR RELATIVES?: MORE THAN THREE TIMES A WEEK
DO YOU BELONG TO ANY CLUBS OR ORGANIZATIONS SUCH AS CHURCH GROUPS UNIONS, FRATERNAL OR ATHLETIC GROUPS, OR SCHOOL GROUPS?: YES

## 2022-09-08 ASSESSMENT — PATIENT HEALTH QUESTIONNAIRE - PHQ9
5. POOR APPETITE OR OVEREATING: 0
2. FEELING DOWN, DEPRESSED OR HOPELESS: 0
9. THOUGHTS THAT YOU WOULD BE BETTER OFF DEAD, OR OF HURTING YOURSELF: 0
SUM OF ALL RESPONSES TO PHQ QUESTIONS 1-9: 9
10. IF YOU CHECKED OFF ANY PROBLEMS, HOW DIFFICULT HAVE THESE PROBLEMS MADE IT FOR YOU TO DO YOUR WORK, TAKE CARE OF THINGS AT HOME, OR GET ALONG WITH OTHER PEOPLE: 1
7. TROUBLE CONCENTRATING ON THINGS, SUCH AS READING THE NEWSPAPER OR WATCHING TELEVISION: 1
4. FEELING TIRED OR HAVING LITTLE ENERGY: 3
SUM OF ALL RESPONSES TO PHQ QUESTIONS 1-9: 9
6. FEELING BAD ABOUT YOURSELF - OR THAT YOU ARE A FAILURE OR HAVE LET YOURSELF OR YOUR FAMILY DOWN: 0
SUM OF ALL RESPONSES TO PHQ QUESTIONS 1-9: 9
SUM OF ALL RESPONSES TO PHQ QUESTIONS 1-9: 9
3. TROUBLE FALLING OR STAYING ASLEEP: 3
SUM OF ALL RESPONSES TO PHQ9 QUESTIONS 1 & 2: 2
8. MOVING OR SPEAKING SO SLOWLY THAT OTHER PEOPLE COULD HAVE NOTICED. OR THE OPPOSITE, BEING SO FIGETY OR RESTLESS THAT YOU HAVE BEEN MOVING AROUND A LOT MORE THAN USUAL: 0
1. LITTLE INTEREST OR PLEASURE IN DOING THINGS: 2

## 2022-09-08 ASSESSMENT — ANXIETY QUESTIONNAIRES
6. BECOMING EASILY ANNOYED OR IRRITABLE: 1
4. TROUBLE RELAXING: 0
2. NOT BEING ABLE TO STOP OR CONTROL WORRYING: 0
3. WORRYING TOO MUCH ABOUT DIFFERENT THINGS: 1
5. BEING SO RESTLESS THAT IT IS HARD TO SIT STILL: 1
7. FEELING AFRAID AS IF SOMETHING AWFUL MIGHT HAPPEN: 0
1. FEELING NERVOUS, ANXIOUS, OR ON EDGE: 0
IF YOU CHECKED OFF ANY PROBLEMS ON THIS QUESTIONNAIRE, HOW DIFFICULT HAVE THESE PROBLEMS MADE IT FOR YOU TO DO YOUR WORK, TAKE CARE OF THINGS AT HOME, OR GET ALONG WITH OTHER PEOPLE: SOMEWHAT DIFFICULT
GAD7 TOTAL SCORE: 3

## 2022-09-08 ASSESSMENT — LIFESTYLE VARIABLES
HOW MANY STANDARD DRINKS CONTAINING ALCOHOL DO YOU HAVE ON A TYPICAL DAY: PATIENT DOES NOT DRINK
HOW OFTEN DO YOU HAVE A DRINK CONTAINING ALCOHOL: NEVER
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
## 2022-09-15 PROBLEM — F41.1 GAD (GENERALIZED ANXIETY DISORDER): Status: ACTIVE | Noted: 2022-09-15

## 2022-09-15 PROBLEM — F33.0 MDD (MAJOR DEPRESSIVE DISORDER), RECURRENT EPISODE, MILD (HCC): Status: ACTIVE | Noted: 2022-09-15

## 2022-09-15 ASSESSMENT — ENCOUNTER SYMPTOMS
CHEST TIGHTNESS: 0
SHORTNESS OF BREATH: 0
WHEEZING: 0
COUGH: 0

## 2022-09-29 ENCOUNTER — OFFICE VISIT (OUTPATIENT)
Dept: FAMILY MEDICINE CLINIC | Age: 47
End: 2022-09-29
Payer: MEDICARE

## 2022-09-29 VITALS
DIASTOLIC BLOOD PRESSURE: 78 MMHG | HEART RATE: 86 BPM | SYSTOLIC BLOOD PRESSURE: 131 MMHG | TEMPERATURE: 98.4 F | OXYGEN SATURATION: 98 % | BODY MASS INDEX: 40.34 KG/M2 | WEIGHT: 289.2 LBS | RESPIRATION RATE: 18 BRPM

## 2022-09-29 DIAGNOSIS — F33.0 MDD (MAJOR DEPRESSIVE DISORDER), RECURRENT EPISODE, MILD (HCC): Primary | ICD-10-CM

## 2022-09-29 DIAGNOSIS — L30.9 ECZEMA, UNSPECIFIED TYPE: ICD-10-CM

## 2022-09-29 DIAGNOSIS — F90.2 ATTENTION DEFICIT HYPERACTIVITY DISORDER (ADHD), COMBINED TYPE: ICD-10-CM

## 2022-09-29 DIAGNOSIS — F41.1 GAD (GENERALIZED ANXIETY DISORDER): ICD-10-CM

## 2022-09-29 PROBLEM — F90.9 HYPERACTIVITY (BEHAVIOR): Status: RESOLVED | Noted: 2022-06-13 | Resolved: 2022-09-29

## 2022-09-29 PROCEDURE — 4004F PT TOBACCO SCREEN RCVD TLK: CPT | Performed by: NURSE PRACTITIONER

## 2022-09-29 PROCEDURE — G8417 CALC BMI ABV UP PARAM F/U: HCPCS | Performed by: NURSE PRACTITIONER

## 2022-09-29 PROCEDURE — 99214 OFFICE O/P EST MOD 30 MIN: CPT | Performed by: NURSE PRACTITIONER

## 2022-09-29 PROCEDURE — G8427 DOCREV CUR MEDS BY ELIG CLIN: HCPCS | Performed by: NURSE PRACTITIONER

## 2022-09-29 RX ORDER — BUPROPION HYDROCHLORIDE 200 MG/1
200 TABLET, EXTENDED RELEASE ORAL 2 TIMES DAILY
Qty: 60 TABLET | Refills: 1 | Status: SHIPPED | OUTPATIENT
Start: 2022-09-29

## 2022-09-29 ASSESSMENT — COLUMBIA-SUICIDE SEVERITY RATING SCALE - C-SSRS
2. HAVE YOU ACTUALLY HAD ANY THOUGHTS OF KILLING YOURSELF?: NO
1. WITHIN THE PAST MONTH, HAVE YOU WISHED YOU WERE DEAD OR WISHED YOU COULD GO TO SLEEP AND NOT WAKE UP?: NO
6. HAVE YOU EVER DONE ANYTHING, STARTED TO DO ANYTHING, OR PREPARED TO DO ANYTHING TO END YOUR LIFE?: NO

## 2022-09-29 ASSESSMENT — ANXIETY QUESTIONNAIRES
3. WORRYING TOO MUCH ABOUT DIFFERENT THINGS: 0
6. BECOMING EASILY ANNOYED OR IRRITABLE: 1
IF YOU CHECKED OFF ANY PROBLEMS ON THIS QUESTIONNAIRE, HOW DIFFICULT HAVE THESE PROBLEMS MADE IT FOR YOU TO DO YOUR WORK, TAKE CARE OF THINGS AT HOME, OR GET ALONG WITH OTHER PEOPLE: NOT DIFFICULT AT ALL
4. TROUBLE RELAXING: 0
2. NOT BEING ABLE TO STOP OR CONTROL WORRYING: 3
7. FEELING AFRAID AS IF SOMETHING AWFUL MIGHT HAPPEN: 0
5. BEING SO RESTLESS THAT IT IS HARD TO SIT STILL: 0

## 2022-09-29 ASSESSMENT — PATIENT HEALTH QUESTIONNAIRE - PHQ9
SUM OF ALL RESPONSES TO PHQ QUESTIONS 1-9: 11
3. TROUBLE FALLING OR STAYING ASLEEP: 3
SUM OF ALL RESPONSES TO PHQ9 QUESTIONS 1 & 2: 1
8. MOVING OR SPEAKING SO SLOWLY THAT OTHER PEOPLE COULD HAVE NOTICED. OR THE OPPOSITE, BEING SO FIGETY OR RESTLESS THAT YOU HAVE BEEN MOVING AROUND A LOT MORE THAN USUAL: 3
6. FEELING BAD ABOUT YOURSELF - OR THAT YOU ARE A FAILURE OR HAVE LET YOURSELF OR YOUR FAMILY DOWN: 0
9. THOUGHTS THAT YOU WOULD BE BETTER OFF DEAD, OR OF HURTING YOURSELF: 0
SUM OF ALL RESPONSES TO PHQ QUESTIONS 1-9: 11
1. LITTLE INTEREST OR PLEASURE IN DOING THINGS: 0
10. IF YOU CHECKED OFF ANY PROBLEMS, HOW DIFFICULT HAVE THESE PROBLEMS MADE IT FOR YOU TO DO YOUR WORK, TAKE CARE OF THINGS AT HOME, OR GET ALONG WITH OTHER PEOPLE: 1
5. POOR APPETITE OR OVEREATING: 0
SUM OF ALL RESPONSES TO PHQ QUESTIONS 1-9: 11
4. FEELING TIRED OR HAVING LITTLE ENERGY: 1
7. TROUBLE CONCENTRATING ON THINGS, SUCH AS READING THE NEWSPAPER OR WATCHING TELEVISION: 3
2. FEELING DOWN, DEPRESSED OR HOPELESS: 1
SUM OF ALL RESPONSES TO PHQ QUESTIONS 1-9: 11

## 2022-09-29 ASSESSMENT — ENCOUNTER SYMPTOMS
WHEEZING: 0
CHEST TIGHTNESS: 0
COUGH: 0
SHORTNESS OF BREATH: 0
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
## 2022-11-08 DIAGNOSIS — J45.40 MODERATE PERSISTENT ASTHMA WITHOUT COMPLICATION: ICD-10-CM

## 2022-11-08 RX ORDER — ALBUTEROL SULFATE 90 UG/1
2 AEROSOL, METERED RESPIRATORY (INHALATION) EVERY 6 HOURS PRN
Qty: 1 EACH | Refills: 5 | Status: SHIPPED | OUTPATIENT
Start: 2022-11-08

## 2022-11-17 ENCOUNTER — HOSPITAL ENCOUNTER (OUTPATIENT)
Dept: SLEEP CENTER | Age: 47
Discharge: HOME | End: 2022-11-17
Payer: MEDICARE

## 2022-11-17 VITALS
SYSTOLIC BLOOD PRESSURE: 140 MMHG | HEART RATE: 79 BPM | DIASTOLIC BLOOD PRESSURE: 86 MMHG | HEIGHT: 71 IN | OXYGEN SATURATION: 93 % | WEIGHT: 290 LBS | BODY MASS INDEX: 40.6 KG/M2

## 2022-11-17 DIAGNOSIS — E66.01 MORBID OBESITY WITH BMI OF 40.0-44.9, ADULT (HCC): ICD-10-CM

## 2022-11-17 DIAGNOSIS — G47.10 HYPERSOMNIA: ICD-10-CM

## 2022-11-17 DIAGNOSIS — G47.33 OSA (OBSTRUCTIVE SLEEP APNEA): ICD-10-CM

## 2022-11-17 PROCEDURE — 99211 OFF/OP EST MAY X REQ PHY/QHP: CPT

## 2022-11-17 PROCEDURE — 99204 OFFICE O/P NEW MOD 45 MIN: CPT | Performed by: INTERNAL MEDICINE

## 2022-11-17 ASSESSMENT — SLEEP AND FATIGUE QUESTIONNAIRES
HOW LIKELY ARE YOU TO NOD OFF OR FALL ASLEEP WHILE WATCHING TV: 0
HOW LIKELY ARE YOU TO NOD OFF OR FALL ASLEEP WHILE SITTING INACTIVE IN A PUBLIC PLACE: 0
HOW LIKELY ARE YOU TO NOD OFF OR FALL ASLEEP IN A CAR, WHILE STOPPED FOR A FEW MINUTES IN TRAFFIC: 0
HOW LIKELY ARE YOU TO NOD OFF OR FALL ASLEEP WHILE SITTING QUIETLY AFTER LUNCH WITHOUT ALCOHOL: 0
ESS TOTAL SCORE: 0
HOW LIKELY ARE YOU TO NOD OFF OR FALL ASLEEP WHILE LYING DOWN TO REST IN THE AFTERNOON WHEN CIRCUMSTANCES PERMIT: 0
NECK CIRCUMFERENCE (INCHES): 20
HOW LIKELY ARE YOU TO NOD OFF OR FALL ASLEEP WHEN YOU ARE A PASSENGER IN A CAR FOR AN HOUR WITHOUT A BREAK: 0
HOW LIKELY ARE YOU TO NOD OFF OR FALL ASLEEP WHILE SITTING AND TALKING TO SOMEONE: 0
HOW LIKELY ARE YOU TO NOD OFF OR FALL ASLEEP WHILE SITTING AND READING: 0
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
## 2022-11-21 ENCOUNTER — HOSPITAL ENCOUNTER (OUTPATIENT)
Age: 47
Setting detail: SPECIMEN
Discharge: HOME | End: 2022-11-21
Payer: MEDICARE

## 2022-11-21 ENCOUNTER — OFFICE VISIT (OUTPATIENT)
Dept: FAMILY MEDICINE CLINIC | Age: 47
End: 2022-11-21
Payer: MEDICARE

## 2022-11-21 VITALS
TEMPERATURE: 97.2 F | HEIGHT: 71 IN | OXYGEN SATURATION: 96 % | HEART RATE: 89 BPM | BODY MASS INDEX: 39.34 KG/M2 | WEIGHT: 281 LBS

## 2022-11-21 DIAGNOSIS — J40 BRONCHITIS: Primary | ICD-10-CM

## 2022-11-21 PROCEDURE — G8417 CALC BMI ABV UP PARAM F/U: HCPCS | Performed by: NURSE PRACTITIONER

## 2022-11-21 PROCEDURE — U0003 INFECTIOUS AGENT DETECTION BY NUCLEIC ACID (DNA OR RNA); SEVERE ACUTE RESPIRATORY SYNDROME CORONAVIRUS 2 (SARS-COV-2) (CORONAVIRUS DISEASE [COVID-19]), AMPLIFIED PROBE TECHNIQUE, MAKING USE OF HIGH THROUGHPUT TECHNOLOGIES AS DESCRIBED BY CMS-2020-01-R: HCPCS

## 2022-11-21 PROCEDURE — 4004F PT TOBACCO SCREEN RCVD TLK: CPT | Performed by: NURSE PRACTITIONER

## 2022-11-21 PROCEDURE — 99213 OFFICE O/P EST LOW 20 MIN: CPT | Performed by: NURSE PRACTITIONER

## 2022-11-21 PROCEDURE — U0005 INFEC AGEN DETEC AMPLI PROBE: HCPCS

## 2022-11-21 PROCEDURE — G8427 DOCREV CUR MEDS BY ELIG CLIN: HCPCS | Performed by: NURSE PRACTITIONER

## 2022-11-21 PROCEDURE — G8484 FLU IMMUNIZE NO ADMIN: HCPCS | Performed by: NURSE PRACTITIONER

## 2022-11-21 RX ORDER — METHYLPREDNISOLONE 4 MG/1
TABLET ORAL
Qty: 1 KIT | Refills: 0 | Status: SHIPPED | OUTPATIENT
Start: 2022-11-21 | End: 2022-11-27

## 2022-11-21 NOTE — TELEPHONE ENCOUNTER
Patient requesting a zpac and steroid, states he has some upper respiratory going on. Advised patient he would need to be seen to get scripts and we dont have any available appts. Advised patient to go to walk in clinic, number provided to patient.

## 2022-11-21 NOTE — PROGRESS NOTES
11/21/22  Lupis Chaudhari  1975    FLU/COVID-19 CLINIC EVALUATION    HPI SYMPTOMS:    Employer:Disabled    [] Fevers  [] Chills  [x] Cough  [] Coughing up blood  [x] Chest Congestion  [] Nasal Congestion  [x] Feeling short of breath  [x] Sometimes  [] Frequently  [] All the time  [x] Chest pain  [] Headaches  []Tolerable  [] Severe  [] Sore throat  [] Muscle aches  [] Nausea  [] Vomiting  []Unable to keep fluids down  [] Diarrhea  []Severe    [] OTHER SYMPTOMS:      Symptom Duration:   [x] 1  [] 2   [] 3   [] 4    [] 5   [] 6   [] 7   [] 8   [] 9   [] 10   [] 11   [] 12   [] 13   [] 14   [] Longer than 14 days    Symptom course:   [x] Worsening     [] Stable     [] Improving    RISK FACTORS:    [] Pregnant or possibly pregnant  [] Age over 61  [] Diabetes  [] Heart disease  [x] Asthma  [] COPD/Other chronic lung diseases  [] Active Cancer  [] On Chemotherapy  [] Taking oral steroids  [] History Lymphoma/Leukemia  [] Close contact with a lab confirmed COVID-19 patient within 14 days of symptom onset  [] History of travel from affected geographical areas within 14 days of symptom onset       VITALS:  There were no vitals filed for this visit. TESTS:    POCT FLU:  [] Positive     []Negative    ASSESSMENT:    [] Flu  [] Possible COVID-19  [] Strep    PLAN:    [] Discharge home with written instructions for:  [] Flu management  [] Possible COVID-19 infection with self-quarantine and management of symptoms  [] Follow-up with primary care physician or emergency department if worsens  [] Evaluation per physician/NP/PA in clinic  [] Sent to ER       An  electronic signature was used to authenticate this note.      --Jeovanny Diaz on 11/21/2022 at 8:43 AM
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
## 2022-11-21 NOTE — PATIENT INSTRUCTIONS
Your COVID 19 test can take 1-5 days for the results to come back. We ask that you make a Mychart page and view your test results this way. You are encouraged to Self quarantine until you know your results. If positive, please work on contact tracing. Increase fluids and rest  Saline nasal spray as needed for nasal congestion  Warm salt water gargles as needed for throat discomfort  Monitor temperature twice a day  Tylenol as needed for fevers and/or discomfort. Big deep breaths periodically throughout the day  Regular Mucinex over the counter as needed for chest congestion  If symptoms worsen -Go to the ER. Follow up with your primary care provider      To Whom it May Concern:    Helene Holstein was tested for COVID-19 11/21/2022. He/she must stay home until test results are back. If test is negative, ok to return to work/school. If test is positive, isolate for a total of 5 days, starting from day 1 of symptom onset. After 5 days, if fever-free for 24 hours and there has been a gradual improvement in symptoms, may come out of isolation, but must consistently wear a mask when around other people for 5 additional days. (5 days isolation, 5 days mask-wearing). We do not recommend retesting as patients may continue to test positive for months even though no longer contagious. It is suggested you call [address] or [address] with any questions regarding isolation timeframe/return to work/school details.

## 2022-11-22 ENCOUNTER — TELEPHONE (OUTPATIENT)
Dept: FAMILY MEDICINE CLINIC | Age: 47
End: 2022-11-22

## 2022-11-22 LAB
SARS-COV-2: NOT DETECTED
SOURCE: NORMAL

## 2022-11-22 NOTE — TELEPHONE ENCOUNTER
An antibiotic is not needed at this time. Viruses last 7 to 10 days with day five being the worst of symptoms. I would suspect his symptoms to be worse on day 2.

## 2022-11-22 NOTE — TELEPHONE ENCOUNTER
Patient seen yesterday and wanted to report that he is feeling worse,  He reports he did start the prednisone yesterday but is still not feeling better and would like to know if you feel he is in need of an antibiotic.

## 2023-01-20 RX ORDER — VALACYCLOVIR HYDROCHLORIDE 1 G/1
1000 TABLET, FILM COATED ORAL 2 TIMES DAILY
Qty: 10 TABLET | Refills: 5 | Status: SHIPPED | OUTPATIENT
Start: 2023-01-20 | End: 2023-01-25

## 2023-02-06 ENCOUNTER — TELEPHONE (OUTPATIENT)
Dept: FAMILY MEDICINE CLINIC | Age: 48
End: 2023-02-06

## 2023-02-06 NOTE — TELEPHONE ENCOUNTER
I left a voicemail letting the patient know Christina Berg had a death in the family and we need to reschedule his appointment.

## 2023-04-27 RX ORDER — VALACYCLOVIR HYDROCHLORIDE 1 G/1
1000 TABLET, FILM COATED ORAL 2 TIMES DAILY
Qty: 10 TABLET | Refills: 5 | Status: SHIPPED | OUTPATIENT
Start: 2023-04-27 | End: 2023-05-02

## 2023-06-30 DIAGNOSIS — J45.40 MODERATE PERSISTENT ASTHMA WITHOUT COMPLICATION: ICD-10-CM

## 2023-06-30 RX ORDER — ALBUTEROL SULFATE 90 UG/1
AEROSOL, METERED RESPIRATORY (INHALATION)
Qty: 18 G | Refills: 0 | Status: SHIPPED | OUTPATIENT
Start: 2023-06-30

## 2023-07-15 DIAGNOSIS — J45.40 MODERATE PERSISTENT ASTHMA WITHOUT COMPLICATION: ICD-10-CM

## 2023-07-17 RX ORDER — ALBUTEROL SULFATE 90 UG/1
AEROSOL, METERED RESPIRATORY (INHALATION)
Qty: 18 G | Refills: 0 | Status: SHIPPED | OUTPATIENT
Start: 2023-07-17

## 2023-10-16 ENCOUNTER — OFFICE VISIT (OUTPATIENT)
Dept: FAMILY MEDICINE CLINIC | Age: 48
End: 2023-10-16
Payer: MEDICARE

## 2023-10-16 VITALS
HEART RATE: 92 BPM | TEMPERATURE: 97.7 F | HEIGHT: 71 IN | BODY MASS INDEX: 35 KG/M2 | SYSTOLIC BLOOD PRESSURE: 140 MMHG | OXYGEN SATURATION: 94 % | DIASTOLIC BLOOD PRESSURE: 100 MMHG | WEIGHT: 250 LBS | RESPIRATION RATE: 20 BRPM

## 2023-10-16 DIAGNOSIS — J06.9 URI WITH COUGH AND CONGESTION: Primary | ICD-10-CM

## 2023-10-16 PROCEDURE — G8427 DOCREV CUR MEDS BY ELIG CLIN: HCPCS | Performed by: NURSE PRACTITIONER

## 2023-10-16 PROCEDURE — 4004F PT TOBACCO SCREEN RCVD TLK: CPT | Performed by: NURSE PRACTITIONER

## 2023-10-16 PROCEDURE — 3077F SYST BP >= 140 MM HG: CPT | Performed by: NURSE PRACTITIONER

## 2023-10-16 PROCEDURE — G8417 CALC BMI ABV UP PARAM F/U: HCPCS | Performed by: NURSE PRACTITIONER

## 2023-10-16 PROCEDURE — 99213 OFFICE O/P EST LOW 20 MIN: CPT | Performed by: NURSE PRACTITIONER

## 2023-10-16 PROCEDURE — G8484 FLU IMMUNIZE NO ADMIN: HCPCS | Performed by: NURSE PRACTITIONER

## 2023-10-16 PROCEDURE — 3080F DIAST BP >= 90 MM HG: CPT | Performed by: NURSE PRACTITIONER

## 2023-10-16 RX ORDER — IBUPROFEN 600 MG/1
600 TABLET ORAL EVERY 6 HOURS PRN
COMMUNITY

## 2023-10-16 RX ORDER — METHYLPREDNISOLONE 4 MG/1
TABLET ORAL
Qty: 1 KIT | Refills: 0 | Status: SHIPPED | OUTPATIENT
Start: 2023-10-16

## 2023-10-16 RX ORDER — AZITHROMYCIN 250 MG/1
250 TABLET, FILM COATED ORAL SEE ADMIN INSTRUCTIONS
Qty: 6 TABLET | Refills: 0 | Status: SHIPPED | OUTPATIENT
Start: 2023-10-16 | End: 2023-10-21

## 2023-10-16 ASSESSMENT — ENCOUNTER SYMPTOMS
SHORTNESS OF BREATH: 1
WHEEZING: 1
VOMITING: 0
CHEST TIGHTNESS: 1
ABDOMINAL PAIN: 0
VISUAL CHANGE: 0
COUGH: 1
CHANGE IN BOWEL HABIT: 0
NAUSEA: 0
SWOLLEN GLANDS: 0
SORE THROAT: 1

## 2023-10-16 NOTE — PATIENT INSTRUCTIONS
To prevent dehydration, drink plenty of fluids. Choose water and other clear liquids until you feel better. If you have kidney, heart, or liver disease and have to limit fluids, talk with your doctor before you increase the amount of fluids you drink. Ask your doctor if you can take an over-the-counter pain medicine, such as acetaminophen (Tylenol), ibuprofen (Advil, Motrin), or naproxen (Aleve). Be safe with medicines. Read and follow all instructions on the label. No one younger than 20 should take aspirin. It has been linked to Reye syndrome, a serious illness. Be careful when taking over-the-counter cold or flu medicines and Tylenol at the same time. Many of these medicines have acetaminophen, which is Tylenol. Read the labels to make sure that you are not taking more than the recommended dose. Too much acetaminophen (Tylenol) can be harmful. Get plenty of rest.  Use saline (saltwater) nasal washes to help keep your nasal passages open and wash out mucus and allergens. You can buy saline nose sprays at a grocery store or drugstore. Follow the instructions on the package. Or you can make your own at home. Add 1 teaspoon of non-iodized salt and 1 teaspoon of baking soda to 2 cups of distilled or boiled and cooled water. Fill a squeeze bottle or neti pot with the nasal wash. Then put the tip into your nostril, and lean over the sink. With your mouth open, gently squirt the liquid. Repeat on the other side. Use a vaporizer or humidifier to add moisture to your bedroom. Follow the instructions for cleaning the machine. Do not smoke or allow others to smoke around you. If you need help quitting, talk to your doctor about stop-smoking programs and medicines. These can increase your chances of quitting for good.

## 2023-10-16 NOTE — PROGRESS NOTES
10/16/23  Melisa Alves  1975    FLU/COVID-19 CLINIC EVALUATION    HPI SYMPTOMS:    Employer:    [] Fevers  [] Chills  [x] Cough  [] Coughing up blood  [x] Chest Congestion  [x] Nasal Congestion  [x] Feeling short of breath  [x] Sometimes  [] Frequently  [] All the time  [x] Chest pain heaviness  [x] Headaches  []Tolerable  [] Severe  [x] Sore throat  [] Muscle aches  [] Nausea  [] Vomiting  []Unable to keep fluids down  [] Diarrhea  []Severe    [] OTHER SYMPTOMS:      Symptom Duration:   [] 1  [] 2   [] 3   [x] 4    [] 5   [] 6   [] 7   [] 8   [] 9   [] 10   [] 11   [] 12   [] 13   [] 14   [] Longer than 14 days    Symptom course:   [x] Worsening     [] Stable     [] Improving    RISK FACTORS:    [] Pregnant or possibly pregnant  [] Age over 61  [] Diabetes  [] Heart disease  [x] Asthma  [] COPD/Other chronic lung diseases  [] Active Cancer  [] On Chemotherapy  [] Taking oral steroids  [] History Lymphoma/Leukemia  [] Close contact with a lab confirmed COVID-19 patient within 14 days of symptom onset  [] History of travel from affected geographical areas within 14 days of symptom onset       VITALS:  There were no vitals filed for this visit. TESTS:    POCT FLU:  [] Positive     []Negative    ASSESSMENT:    [] Flu  [] Possible COVID-19  [] Strep    PLAN:    [] Discharge home with written instructions for:  [] Flu management  [] Possible COVID-19 infection with self-quarantine and management of symptoms  [] Follow-up with primary care physician or emergency department if worsens  [] Evaluation per physician/NP/PA in clinic  [] Sent to ER       An  electronic signature was used to authenticate this note.      --Alvaro Forte MA on 10/16/2023 at 2:52 PM

## 2023-10-16 NOTE — PROGRESS NOTES
Soto Stevenson (:  1975) is a 52 y.o. male,Established patient, here for evaluation of the following chief complaint(s):    Pharyngitis      SUBJECTIVE/OBJECTIVE:  Pt presents with c/o as stated below -     Pharyngitis  This is a new problem. The current episode started in the past 7 days. The problem occurs constantly. The problem has been gradually worsening. Associated symptoms include congestion (nasal congestion), coughing, headaches, a sore throat and vertigo (intermittent). Pertinent negatives include no abdominal pain, anorexia, arthralgias, change in bowel habit, chest pain, chills, diaphoresis, fatigue, fever, joint swelling, myalgias, nausea, neck pain, numbness, rash, swollen glands, urinary symptoms, visual change, vomiting or weakness. The symptoms are aggravated by drinking, eating and swallowing. He has tried acetaminophen (tylenol cold and  sinus) for the symptoms. The treatment provided no relief. Allergies   Allergen Reactions    Penicillins Rash, Anaphylaxis and Hives     Reaction unknown per family    Poultry Meal Anaphylaxis        Current Outpatient Medications   Medication Sig Dispense Refill    ibuprofen (ADVIL;MOTRIN) 600 MG tablet Take 1 tablet by mouth every 6 hours as needed for Pain      methylPREDNISolone (MEDROL DOSEPACK) 4 MG tablet Take by mouth.  1 kit 0    azithromycin (ZITHROMAX) 250 MG tablet Take 1 tablet by mouth See Admin Instructions for 5 days 500mg on day 1 followed by 250mg on days 2 - 5 6 tablet 0    albuterol sulfate HFA (PROVENTIL;VENTOLIN;PROAIR) 108 (90 Base) MCG/ACT inhaler INHALE 2 PUFFS BY MOUTH EVERY 6 HOURS AS NEEDED FOR WHEEZING FOR SHORTNESS OF BREATH 18 g 0    lisinopril (PRINIVIL;ZESTRIL) 10 MG tablet Take 1 tablet by mouth daily 30 tablet 5    Fluticasone-Salmeterol (ADVAIR DISKUS IN) Inhale into the lungs Unsure of dose      ipratropium-albuterol (DUONEB) 0.5-2.5 (3) MG/3ML SOLN nebulizer solution Inhale 3 mLs into the lungs every 4 hours

## 2023-10-26 RX ORDER — VALACYCLOVIR HYDROCHLORIDE 1 G/1
1000 TABLET, FILM COATED ORAL 2 TIMES DAILY
Qty: 10 TABLET | Refills: 5 | Status: SHIPPED | OUTPATIENT
Start: 2023-10-26 | End: 2023-11-25

## 2024-02-05 ENCOUNTER — OFFICE VISIT (OUTPATIENT)
Dept: FAMILY MEDICINE CLINIC | Age: 49
End: 2024-02-05
Payer: MEDICARE

## 2024-02-05 VITALS
TEMPERATURE: 97.2 F | HEIGHT: 71 IN | RESPIRATION RATE: 18 BRPM | BODY MASS INDEX: 35.56 KG/M2 | HEART RATE: 91 BPM | DIASTOLIC BLOOD PRESSURE: 82 MMHG | WEIGHT: 254 LBS | OXYGEN SATURATION: 99 % | SYSTOLIC BLOOD PRESSURE: 116 MMHG

## 2024-02-05 DIAGNOSIS — R09.89 SYMPTOMS OF UPPER RESPIRATORY INFECTION (URI): ICD-10-CM

## 2024-02-05 DIAGNOSIS — J20.8 ACUTE BRONCHITIS DUE TO OTHER SPECIFIED ORGANISMS: Primary | ICD-10-CM

## 2024-02-05 PROCEDURE — G8427 DOCREV CUR MEDS BY ELIG CLIN: HCPCS | Performed by: NURSE PRACTITIONER

## 2024-02-05 PROCEDURE — 3079F DIAST BP 80-89 MM HG: CPT | Performed by: NURSE PRACTITIONER

## 2024-02-05 PROCEDURE — G8484 FLU IMMUNIZE NO ADMIN: HCPCS | Performed by: NURSE PRACTITIONER

## 2024-02-05 PROCEDURE — 99213 OFFICE O/P EST LOW 20 MIN: CPT | Performed by: NURSE PRACTITIONER

## 2024-02-05 PROCEDURE — G8417 CALC BMI ABV UP PARAM F/U: HCPCS | Performed by: NURSE PRACTITIONER

## 2024-02-05 PROCEDURE — 3074F SYST BP LT 130 MM HG: CPT | Performed by: NURSE PRACTITIONER

## 2024-02-05 PROCEDURE — 4004F PT TOBACCO SCREEN RCVD TLK: CPT | Performed by: NURSE PRACTITIONER

## 2024-02-05 RX ORDER — BENZONATATE 200 MG/1
200 CAPSULE ORAL 3 TIMES DAILY PRN
Qty: 21 CAPSULE | Refills: 0 | Status: SHIPPED | OUTPATIENT
Start: 2024-02-05 | End: 2024-02-12

## 2024-02-05 RX ORDER — AZITHROMYCIN 250 MG/1
250 TABLET, FILM COATED ORAL SEE ADMIN INSTRUCTIONS
Qty: 6 TABLET | Refills: 0 | Status: SHIPPED | OUTPATIENT
Start: 2024-02-05 | End: 2024-02-10

## 2024-02-05 RX ORDER — METHYLPREDNISOLONE 4 MG/1
TABLET ORAL
Qty: 1 KIT | Refills: 0 | Status: SHIPPED | OUTPATIENT
Start: 2024-02-05

## 2024-02-05 ASSESSMENT — ENCOUNTER SYMPTOMS
WHEEZING: 1
DIARRHEA: 0
SORE THROAT: 0
SWOLLEN GLANDS: 0
VOMITING: 0
SINUS PAIN: 1
NAUSEA: 0
CHEST TIGHTNESS: 1
ABDOMINAL PAIN: 0
COUGH: 1
SHORTNESS OF BREATH: 1
RHINORRHEA: 1
SINUS PRESSURE: 1

## 2024-02-05 NOTE — PROGRESS NOTES
Take 1 tablet by mouth every 6 hours as needed for Pain      buPROPion (WELLBUTRIN SR) 200 MG extended release tablet Take 1 tablet by mouth 2 times daily (Patient not taking: Reported on 10/16/2023) 60 tablet 1    Nebulizers (COMP AIR COMPRESSOR NEBULIZER) MISC 1 kit by Does not apply route once for 1 dose 1 each 0    acetaminophen (TYLENOL) 500 MG tablet Take 500 mg by mouth every 6 hours as needed for Pain (Patient not taking: Reported on 11/17/2022)       No current facility-administered medications for this visit.       Review of Systems   HENT:  Positive for congestion (chest and nasal), rhinorrhea, sinus pressure, sinus pain and sneezing. Negative for ear pain and sore throat.    Respiratory:  Positive for cough (yellowish sputum), chest tightness, shortness of breath (with certain activities) and wheezing.    Cardiovascular:  Negative for chest pain.   Gastrointestinal:  Negative for abdominal pain, diarrhea, nausea and vomiting.   Genitourinary:  Negative for difficulty urinating and dysuria.   Musculoskeletal:  Negative for joint pain and neck pain.   Skin:  Negative for rash.   Neurological:  Positive for headaches.       /82   Pulse 91   Temp 97.2 °F (36.2 °C)   Resp 18   Ht 1.803 m (5' 11\")   Wt 115.2 kg (254 lb)   SpO2 99%   BMI 35.43 kg/m²      Physical Exam  Vitals reviewed.   Constitutional:       General: He is not in acute distress.     Appearance: He is not toxic-appearing.   HENT:      Right Ear: Tympanic membrane, ear canal and external ear normal.      Left Ear: Tympanic membrane, ear canal and external ear normal.      Nose: Congestion and rhinorrhea present.      Mouth/Throat:      Mouth: Mucous membranes are moist.      Pharynx: No oropharyngeal exudate or posterior oropharyngeal erythema.   Cardiovascular:      Rate and Rhythm: Normal rate and regular rhythm.      Pulses: Normal pulses.      Heart sounds: Normal heart sounds.   Pulmonary:      Effort: Pulmonary effort is

## 2024-04-08 ENCOUNTER — OFFICE VISIT (OUTPATIENT)
Dept: FAMILY MEDICINE CLINIC | Age: 49
End: 2024-04-08
Payer: MEDICARE

## 2024-04-08 VITALS
HEART RATE: 104 BPM | SYSTOLIC BLOOD PRESSURE: 132 MMHG | WEIGHT: 264 LBS | OXYGEN SATURATION: 96 % | BODY MASS INDEX: 36.82 KG/M2 | TEMPERATURE: 97.9 F | DIASTOLIC BLOOD PRESSURE: 84 MMHG

## 2024-04-08 DIAGNOSIS — J06.9 VIRAL URI WITH COUGH: Primary | ICD-10-CM

## 2024-04-08 PROCEDURE — 3079F DIAST BP 80-89 MM HG: CPT | Performed by: NURSE PRACTITIONER

## 2024-04-08 PROCEDURE — G8427 DOCREV CUR MEDS BY ELIG CLIN: HCPCS | Performed by: NURSE PRACTITIONER

## 2024-04-08 PROCEDURE — 87428 SARSCOV & INF VIR A&B AG IA: CPT | Performed by: NURSE PRACTITIONER

## 2024-04-08 PROCEDURE — 99213 OFFICE O/P EST LOW 20 MIN: CPT | Performed by: NURSE PRACTITIONER

## 2024-04-08 PROCEDURE — 3075F SYST BP GE 130 - 139MM HG: CPT | Performed by: NURSE PRACTITIONER

## 2024-04-08 PROCEDURE — G8417 CALC BMI ABV UP PARAM F/U: HCPCS | Performed by: NURSE PRACTITIONER

## 2024-04-08 PROCEDURE — 4004F PT TOBACCO SCREEN RCVD TLK: CPT | Performed by: NURSE PRACTITIONER

## 2024-04-08 RX ORDER — BENZONATATE 100 MG/1
100 CAPSULE ORAL 3 TIMES DAILY PRN
Qty: 20 CAPSULE | Refills: 0 | Status: SHIPPED | OUTPATIENT
Start: 2024-04-08

## 2024-04-08 RX ORDER — FLUTICASONE PROPIONATE 50 MCG
1 SPRAY, SUSPENSION (ML) NASAL DAILY
Qty: 16 G | Refills: 0 | Status: SHIPPED | OUTPATIENT
Start: 2024-04-08

## 2024-04-08 ASSESSMENT — ENCOUNTER SYMPTOMS
SHORTNESS OF BREATH: 1
COUGH: 1
SWOLLEN GLANDS: 0
VOMITING: 0
CHEST TIGHTNESS: 1
DIARRHEA: 0
SINUS PAIN: 1
ABDOMINAL PAIN: 0
RHINORRHEA: 1
SORE THROAT: 0
SINUS PRESSURE: 1
WHEEZING: 1
NAUSEA: 0
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
## 2024-04-09 ENCOUNTER — OFFICE VISIT (OUTPATIENT)
Dept: FAMILY MEDICINE CLINIC | Age: 49
End: 2024-04-09

## 2024-04-09 VITALS
HEART RATE: 96 BPM | RESPIRATION RATE: 18 BRPM | HEIGHT: 70 IN | DIASTOLIC BLOOD PRESSURE: 76 MMHG | BODY MASS INDEX: 38.65 KG/M2 | SYSTOLIC BLOOD PRESSURE: 130 MMHG | WEIGHT: 270 LBS | OXYGEN SATURATION: 99 %

## 2024-04-09 DIAGNOSIS — J20.8 ACUTE BRONCHITIS DUE TO OTHER SPECIFIED ORGANISMS: Primary | ICD-10-CM

## 2024-04-09 PROCEDURE — 3078F DIAST BP <80 MM HG: CPT | Performed by: PHYSICIAN ASSISTANT

## 2024-04-09 PROCEDURE — 99214 OFFICE O/P EST MOD 30 MIN: CPT | Performed by: PHYSICIAN ASSISTANT

## 2024-04-09 PROCEDURE — 3075F SYST BP GE 130 - 139MM HG: CPT | Performed by: PHYSICIAN ASSISTANT

## 2024-04-09 RX ORDER — AZITHROMYCIN 250 MG/1
TABLET, FILM COATED ORAL
Qty: 6 TABLET | Refills: 0 | Status: SHIPPED | OUTPATIENT
Start: 2024-04-09 | End: 2024-04-19

## 2024-04-09 RX ORDER — PREDNISONE 20 MG/1
20 TABLET ORAL 2 TIMES DAILY
Qty: 10 TABLET | Refills: 0 | Status: SHIPPED | OUTPATIENT
Start: 2024-04-09 | End: 2024-04-14

## 2024-04-09 SDOH — ECONOMIC STABILITY: FOOD INSECURITY: WITHIN THE PAST 12 MONTHS, THE FOOD YOU BOUGHT JUST DIDN'T LAST AND YOU DIDN'T HAVE MONEY TO GET MORE.: NEVER TRUE

## 2024-04-09 SDOH — ECONOMIC STABILITY: INCOME INSECURITY: HOW HARD IS IT FOR YOU TO PAY FOR THE VERY BASICS LIKE FOOD, HOUSING, MEDICAL CARE, AND HEATING?: SOMEWHAT HARD

## 2024-04-09 SDOH — ECONOMIC STABILITY: FOOD INSECURITY: WITHIN THE PAST 12 MONTHS, YOU WORRIED THAT YOUR FOOD WOULD RUN OUT BEFORE YOU GOT MONEY TO BUY MORE.: NEVER TRUE

## 2024-04-09 ASSESSMENT — PATIENT HEALTH QUESTIONNAIRE - PHQ9
9. THOUGHTS THAT YOU WOULD BE BETTER OFF DEAD, OR OF HURTING YOURSELF: NOT AT ALL
4. FEELING TIRED OR HAVING LITTLE ENERGY: NOT AT ALL
3. TROUBLE FALLING OR STAYING ASLEEP: NOT AT ALL
8. MOVING OR SPEAKING SO SLOWLY THAT OTHER PEOPLE COULD HAVE NOTICED. OR THE OPPOSITE, BEING SO FIGETY OR RESTLESS THAT YOU HAVE BEEN MOVING AROUND A LOT MORE THAN USUAL: NOT AT ALL
7. TROUBLE CONCENTRATING ON THINGS, SUCH AS READING THE NEWSPAPER OR WATCHING TELEVISION: NOT AT ALL
SUM OF ALL RESPONSES TO PHQ QUESTIONS 1-9: 0
2. FEELING DOWN, DEPRESSED OR HOPELESS: NOT AT ALL
SUM OF ALL RESPONSES TO PHQ QUESTIONS 1-9: 0
SUM OF ALL RESPONSES TO PHQ QUESTIONS 1-9: 0
10. IF YOU CHECKED OFF ANY PROBLEMS, HOW DIFFICULT HAVE THESE PROBLEMS MADE IT FOR YOU TO DO YOUR WORK, TAKE CARE OF THINGS AT HOME, OR GET ALONG WITH OTHER PEOPLE: NOT DIFFICULT AT ALL
5. POOR APPETITE OR OVEREATING: NOT AT ALL
1. LITTLE INTEREST OR PLEASURE IN DOING THINGS: NOT AT ALL
SUM OF ALL RESPONSES TO PHQ QUESTIONS 1-9: 0
6. FEELING BAD ABOUT YOURSELF - OR THAT YOU ARE A FAILURE OR HAVE LET YOURSELF OR YOUR FAMILY DOWN: NOT AT ALL
SUM OF ALL RESPONSES TO PHQ9 QUESTIONS 1 & 2: 0

## 2024-04-09 NOTE — PATIENT INSTRUCTIONS
Welcome to [facility]:    Did you know we now have a faster way for you to move through your appointment? For your convenience, we now have digital registration available. When you schedule your next appointment, you will receive a link via your email as well as a text message that will allow you to complete any paperwork digitally before your appointment. We are committed to providing you the best care possible.    If you receive a survey after visiting one of our offices, please take time to share your experience concerning your physician office visit.  These surveys are confidential and no health information about you is shared.    We are eager to improve for you and continue to give you satisfactory care, we are counting on your feedback to help make that happen.
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
## 2024-07-10 ENCOUNTER — HOSPITAL ENCOUNTER (OUTPATIENT)
Dept: WOUND CARE | Age: 49
Discharge: HOME | End: 2024-07-10
Payer: COMMERCIAL

## 2024-07-10 VITALS
DIASTOLIC BLOOD PRESSURE: 80 MMHG | TEMPERATURE: 98.1 F | SYSTOLIC BLOOD PRESSURE: 137 MMHG | HEART RATE: 88 BPM | RESPIRATION RATE: 16 BRPM

## 2024-07-10 DIAGNOSIS — R21 RASH AND NONSPECIFIC SKIN ERUPTION: Primary | ICD-10-CM

## 2024-07-10 PROCEDURE — 99213 OFFICE O/P EST LOW 20 MIN: CPT

## 2024-07-10 PROCEDURE — 97597 DBRDMT OPN WND 1ST 20 CM/<: CPT | Performed by: NURSE PRACTITIONER

## 2024-07-10 PROCEDURE — 99203 OFFICE O/P NEW LOW 30 MIN: CPT | Performed by: NURSE PRACTITIONER

## 2024-07-10 PROCEDURE — 97597 DBRDMT OPN WND 1ST 20 CM/<: CPT

## 2024-07-10 RX ORDER — LIDOCAINE HYDROCHLORIDE 20 MG/ML
JELLY TOPICAL ONCE
OUTPATIENT
Start: 2024-07-10 | End: 2024-07-10

## 2024-07-10 RX ORDER — LIDOCAINE 40 MG/G
CREAM TOPICAL ONCE
OUTPATIENT
Start: 2024-07-10 | End: 2024-07-10

## 2024-07-10 RX ORDER — IBUPROFEN 200 MG
TABLET ORAL ONCE
OUTPATIENT
Start: 2024-07-10 | End: 2024-07-10

## 2024-07-10 RX ORDER — DOXYCYCLINE HYCLATE 100 MG
100 TABLET ORAL 2 TIMES DAILY
Qty: 20 TABLET | Refills: 0 | Status: SHIPPED | OUTPATIENT
Start: 2024-07-10 | End: 2024-07-20

## 2024-07-10 RX ORDER — CLOTRIMAZOLE AND BETAMETHASONE DIPROPIONATE 10; .64 MG/G; MG/G
CREAM TOPICAL
Qty: 45 G | Refills: 5 | Status: SHIPPED | OUTPATIENT
Start: 2024-07-10

## 2024-07-10 RX ORDER — BETAMETHASONE DIPROPIONATE 0.5 MG/G
CREAM TOPICAL ONCE
OUTPATIENT
Start: 2024-07-10 | End: 2024-07-10

## 2024-07-10 RX ORDER — LIDOCAINE 50 MG/G
OINTMENT TOPICAL ONCE
OUTPATIENT
Start: 2024-07-10 | End: 2024-07-10

## 2024-07-10 RX ORDER — BACITRACIN ZINC AND POLYMYXIN B SULFATE 500; 1000 [USP'U]/G; [USP'U]/G
OINTMENT TOPICAL ONCE
OUTPATIENT
Start: 2024-07-10 | End: 2024-07-10

## 2024-07-10 RX ORDER — TRIAMCINOLONE ACETONIDE 1 MG/G
OINTMENT TOPICAL ONCE
OUTPATIENT
Start: 2024-07-10 | End: 2024-07-10

## 2024-07-10 RX ORDER — GENTAMICIN SULFATE 1 MG/G
OINTMENT TOPICAL ONCE
OUTPATIENT
Start: 2024-07-10 | End: 2024-07-10

## 2024-07-10 RX ORDER — SODIUM CHLOR/HYPOCHLOROUS ACID 0.033 %
SOLUTION, IRRIGATION IRRIGATION ONCE
OUTPATIENT
Start: 2024-07-10 | End: 2024-07-10

## 2024-07-10 RX ORDER — LIDOCAINE HYDROCHLORIDE 40 MG/ML
SOLUTION TOPICAL ONCE
OUTPATIENT
Start: 2024-07-10 | End: 2024-07-10

## 2024-07-10 RX ORDER — BACITRACIN ZINC 500 [USP'U]/G
OINTMENT TOPICAL ONCE
OUTPATIENT
Start: 2024-07-10 | End: 2024-07-10

## 2024-07-10 RX ORDER — CLOBETASOL PROPIONATE 0.5 MG/G
OINTMENT TOPICAL ONCE
OUTPATIENT
Start: 2024-07-10 | End: 2024-07-10

## 2024-07-10 ASSESSMENT — PAIN DESCRIPTION - ONSET: ONSET: ON-GOING

## 2024-07-10 ASSESSMENT — PAIN DESCRIPTION - FREQUENCY: FREQUENCY: INTERMITTENT

## 2024-07-10 ASSESSMENT — PAIN DESCRIPTION - ORIENTATION: ORIENTATION: RIGHT

## 2024-07-10 ASSESSMENT — PAIN SCALES - GENERAL: PAINLEVEL_OUTOF10: 3

## 2024-07-10 ASSESSMENT — PAIN - FUNCTIONAL ASSESSMENT: PAIN_FUNCTIONAL_ASSESSMENT: ACTIVITIES ARE NOT PREVENTED

## 2024-07-10 ASSESSMENT — PAIN DESCRIPTION - LOCATION: LOCATION: LEG

## 2024-07-10 ASSESSMENT — PAIN DESCRIPTION - DESCRIPTORS: DESCRIPTORS: TINGLING

## 2024-07-10 NOTE — PATIENT INSTRUCTIONS
PHYSICIAN ORDERS AND DISCHARGE INSTRUCTIONS    NOTE: Upon discharge from the Wound Center, you will receive a patient experience survey. We would be grateful if you would take the time to fill this survey out.    Wound care order history:     DRU's   Right       Left    Date:   Cultures:     Grafts:     Antibiotics:        Continuing wound care orders and information:                Residence:                Continue home health care with:    Your wound-care supplies will be provided by:     Wound cleansing:     Do not scrub or use excessive force.   Wash hands with soap and water before and after dressing changes.  Prior to applying a clean dressing, cleanse wound with normal saline, wound cleanser, or mild soap and water.   Ask the physician or nurse before getting the wound(s) wet in a shower.    General Wound management:   Keep weight off wounds and reposition every 2 hours.   Avoid standing for long periods of time.   Apply wraps/stockings in AM and remove at bedtime.  If swelling is present, elevate legs to the level of the heart or above for 30 minutes 4-5 times a day and/or when sitting.  When taking antibiotics take entire prescription as ordered by physician do not stop taking until medicine is all gone.   Increase protein intake to promote healing.         Orders for this week: 7/10/2024    Rx:    Right Anterior Lower Leg Wound - Wash with soap and water, pat dry.  Apply Clobetasol and Antifungal powder to red area/wound in clinic (use Lotrisone at home).  Cover with ABD.  Wrap with conform and secure with tape.  Wear Tubi F.  Reapply ointment 1-2 times daily.    Dispense 30 day quantity when ordering supplies.    Follow up with Vivek Conklin CNP in 1 week(s) in the wound care center.  Call  for any questions or concerns.  Date__________   Time____________

## 2024-07-10 NOTE — PROGRESS NOTES
Devitalized Tissue Debrided:  slough and exudate    Pre Debridement Measurements:  Are located in the Wound Documentation Flow Sheet    All active wounds listed below with today's date are evaluated  Wound(s)    debrided this date include # : 1     Post  Debridement Measurements:  Wound 12/16/16 Laceration Head Right (Active)   Number of days: 2763       Wound 12/16/16 Eye Left (Active)   Number of days: 2763       Wound Abrasion(s) Abdomen Mid;Right (Active)   Number of days:        Wound  Right (Active)   Number of days:        Wound 12/16/16 Abrasion(s) Hip Left (Active)   Number of days: 2763       Wound 12/16/16 Abrasion(s) Knee Left;Right (Active)   Number of days: 2763       Wound 12/16/16 Abdomen Left;Right (Active)   Number of days: 2763       Wound 12/16/16  Right (Active)   Number of days: 2763       Wound 12/16/16 Laceration Head (Active)   Number of days: 2763       Wound 07/10/24 Right #1 (onset 3 days) Right Anterior Lower Leg Cluster (Active)   Wound Image   07/10/24 1457   Wound Etiology Traumatic 07/10/24 1525   Dressing Status New dressing applied 07/10/24 1525   Wound Cleansed Soap and water;Wound cleanser 07/10/24 1457   Wound Length (cm) 2 cm 07/10/24 1457   Wound Width (cm) 3.5 cm 07/10/24 1457   Wound Depth (cm) 0.1 cm 07/10/24 1457   Wound Surface Area (cm^2) 7 cm^2 07/10/24 1457   Wound Volume (cm^3) 0.7 cm^3 07/10/24 1457   Post-Procedure Length (cm) 2 cm 07/10/24 1509   Post-Procedure Width (cm) 3.5 cm 07/10/24 1509   Post-Procedure Depth (cm) 0.1 cm 07/10/24 1509   Post-Procedure Surface Area (cm^2) 7 cm^2 07/10/24 1509   Post-Procedure Volume (cm^3) 0.7 cm^3 07/10/24 1509   Distance Tunneling (cm) 0 cm 07/10/24 1457   Tunneling Position ___ O'Clock 0 07/10/24 1457   Undermining Starts ___ O'Clock 0 07/10/24 1457   Undermining Ends___ O'Clock 0 07/10/24 1457   Undermining Maxium Distance (cm) 0 07/10/24 1457   Wound Assessment Pink/red;Slough 07/10/24 1457   Drainage Amount Moderate

## 2024-07-17 ENCOUNTER — HOSPITAL ENCOUNTER (OUTPATIENT)
Dept: WOUND CARE | Age: 49
Discharge: HOME | End: 2024-07-17
Payer: COMMERCIAL

## 2024-07-17 VITALS
RESPIRATION RATE: 16 BRPM | DIASTOLIC BLOOD PRESSURE: 85 MMHG | HEART RATE: 86 BPM | SYSTOLIC BLOOD PRESSURE: 151 MMHG | TEMPERATURE: 98.2 F

## 2024-07-17 DIAGNOSIS — R21 RASH AND NONSPECIFIC SKIN ERUPTION: Primary | ICD-10-CM

## 2024-07-17 PROCEDURE — 99213 OFFICE O/P EST LOW 20 MIN: CPT

## 2024-07-17 PROCEDURE — 99213 OFFICE O/P EST LOW 20 MIN: CPT | Performed by: NURSE PRACTITIONER

## 2024-07-17 RX ORDER — CLOBETASOL PROPIONATE 0.5 MG/G
OINTMENT TOPICAL ONCE
Status: DISCONTINUED | OUTPATIENT
Start: 2024-07-17 | End: 2024-07-18 | Stop reason: HOSPADM

## 2024-07-17 RX ORDER — CLOBETASOL PROPIONATE 0.5 MG/G
OINTMENT TOPICAL ONCE
OUTPATIENT
Start: 2024-07-17 | End: 2024-07-17

## 2024-07-17 RX ORDER — LIDOCAINE 50 MG/G
OINTMENT TOPICAL ONCE
OUTPATIENT
Start: 2024-07-17 | End: 2024-07-17

## 2024-07-17 RX ORDER — SODIUM CHLOR/HYPOCHLOROUS ACID 0.033 %
SOLUTION, IRRIGATION IRRIGATION ONCE
OUTPATIENT
Start: 2024-07-17 | End: 2024-07-17

## 2024-07-17 RX ORDER — LIDOCAINE HYDROCHLORIDE 40 MG/ML
SOLUTION TOPICAL ONCE
OUTPATIENT
Start: 2024-07-17 | End: 2024-07-17

## 2024-07-17 RX ORDER — BACITRACIN ZINC 500 [USP'U]/G
OINTMENT TOPICAL ONCE
OUTPATIENT
Start: 2024-07-17 | End: 2024-07-17

## 2024-07-17 RX ORDER — LIDOCAINE HYDROCHLORIDE 20 MG/ML
JELLY TOPICAL ONCE
OUTPATIENT
Start: 2024-07-17 | End: 2024-07-17

## 2024-07-17 RX ORDER — TRIAMCINOLONE ACETONIDE 1 MG/G
OINTMENT TOPICAL ONCE
OUTPATIENT
Start: 2024-07-17 | End: 2024-07-17

## 2024-07-17 RX ORDER — IBUPROFEN 200 MG
TABLET ORAL ONCE
OUTPATIENT
Start: 2024-07-17 | End: 2024-07-17

## 2024-07-17 RX ORDER — BETAMETHASONE DIPROPIONATE 0.5 MG/G
CREAM TOPICAL ONCE
OUTPATIENT
Start: 2024-07-17 | End: 2024-07-17

## 2024-07-17 RX ORDER — GENTAMICIN SULFATE 1 MG/G
OINTMENT TOPICAL ONCE
OUTPATIENT
Start: 2024-07-17 | End: 2024-07-17

## 2024-07-17 RX ORDER — BACITRACIN ZINC AND POLYMYXIN B SULFATE 500; 1000 [USP'U]/G; [USP'U]/G
OINTMENT TOPICAL ONCE
OUTPATIENT
Start: 2024-07-17 | End: 2024-07-17

## 2024-07-17 RX ORDER — LIDOCAINE 40 MG/G
CREAM TOPICAL ONCE
OUTPATIENT
Start: 2024-07-17 | End: 2024-07-17

## 2024-07-17 ASSESSMENT — PAIN SCALES - GENERAL: PAINLEVEL_OUTOF10: 0

## 2024-07-17 NOTE — PATIENT INSTRUCTIONS
PHYSICIAN ORDERS AND DISCHARGE INSTRUCTIONS    NOTE: Upon discharge from the Wound Center, you will receive a patient experience survey. We would be grateful if you would take the time to fill this survey out.    Wound care order history:     DRU's   Right       Left    Date:   Cultures:     Grafts:     Antibiotics:        Continuing wound care orders and information:                Residence:                Continue home health care with:    Your wound-care supplies will be provided by:     Wound cleansing:     Do not scrub or use excessive force.   Wash hands with soap and water before and after dressing changes.  Prior to applying a clean dressing, cleanse wound with normal saline, wound cleanser, or mild soap and water.   Ask the physician or nurse before getting the wound(s) wet in a shower.    General Wound management:   Keep weight off wounds and reposition every 2 hours.   Avoid standing for long periods of time.   Apply wraps/stockings in AM and remove at bedtime.  If swelling is present, elevate legs to the level of the heart or above for 30 minutes 4-5 times a day and/or when sitting.  When taking antibiotics take entire prescription as ordered by physician do not stop taking until medicine is all gone.   Increase protein intake to promote healing.         Orders for this week: 7/17/2024    Rx:    Right Anterior Lower Leg Wound - Wash with soap and water, pat dry.  Apply Clobetasol and Antifungal powder to red area/wound in clinic (use Lotrisone at home).  Wrap area with conform and secure with tape.  Wear Tubi F.  Reapply ointment 1-2 times daily.    Dispense 30 day quantity when ordering supplies.    Follow up with Vivek Conklin CNP in 1 week(s) in the wound care center.  Call  for any questions or concerns.  Date__________   Time____________

## 2024-07-17 NOTE — PROGRESS NOTES
protein intake to promote healing.         Orders for this week: 7/17/2024    Rx:    Right Anterior Lower Leg Wound - Wash with soap and water, pat dry.  Apply Clobetasol and Antifungal powder to red area/wound in clinic (use Lotrisone at home).  Wrap area with conform and secure with tape.  Wear Tubi F.  Reapply ointment 1-2 times daily.    Dispense 30 day quantity when ordering supplies.    Follow up with Vivek Conklin CNP in 1 week(s) in the wound care center.  Call  for any questions or concerns.  Date__________   Time____________    Treatment Note Wound 07/10/24 Right #1 (onset 3 days) Right Anterior Lower Leg Cluster-Dressing/Treatment:  (clobetasol antifungal conform tubi f)    Written Patient Dismissal Instructions Given            Electronically signed by TOMY Shea CNP on 7/17/2024 at 3:13 PM

## 2024-07-24 ENCOUNTER — HOSPITAL ENCOUNTER (OUTPATIENT)
Dept: WOUND CARE | Age: 49
Discharge: HOME | End: 2024-07-24
Payer: COMMERCIAL

## 2024-07-24 VITALS
TEMPERATURE: 97.9 F | RESPIRATION RATE: 16 BRPM | DIASTOLIC BLOOD PRESSURE: 88 MMHG | SYSTOLIC BLOOD PRESSURE: 156 MMHG | HEART RATE: 99 BPM

## 2024-07-24 DIAGNOSIS — R21 RASH AND NONSPECIFIC SKIN ERUPTION: Primary | ICD-10-CM

## 2024-07-24 DIAGNOSIS — R21 RASH AND NONSPECIFIC SKIN ERUPTION: ICD-10-CM

## 2024-07-24 PROCEDURE — 99213 OFFICE O/P EST LOW 20 MIN: CPT | Performed by: NURSE PRACTITIONER

## 2024-07-24 PROCEDURE — 99212 OFFICE O/P EST SF 10 MIN: CPT

## 2024-07-24 RX ORDER — LIDOCAINE HYDROCHLORIDE 20 MG/ML
JELLY TOPICAL ONCE
Status: CANCELLED | OUTPATIENT
Start: 2024-07-24 | End: 2024-07-24

## 2024-07-24 RX ORDER — BACITRACIN ZINC 500 [USP'U]/G
OINTMENT TOPICAL ONCE
Status: CANCELLED | OUTPATIENT
Start: 2024-07-24 | End: 2024-07-24

## 2024-07-24 RX ORDER — IBUPROFEN 200 MG
TABLET ORAL ONCE
Status: CANCELLED | OUTPATIENT
Start: 2024-07-24 | End: 2024-07-24

## 2024-07-24 RX ORDER — LIDOCAINE 40 MG/G
CREAM TOPICAL ONCE
Status: CANCELLED | OUTPATIENT
Start: 2024-07-24 | End: 2024-07-24

## 2024-07-24 RX ORDER — BACITRACIN ZINC AND POLYMYXIN B SULFATE 500; 1000 [USP'U]/G; [USP'U]/G
OINTMENT TOPICAL ONCE
Status: CANCELLED | OUTPATIENT
Start: 2024-07-24 | End: 2024-07-24

## 2024-07-24 RX ORDER — SODIUM CHLOR/HYPOCHLOROUS ACID 0.033 %
SOLUTION, IRRIGATION IRRIGATION ONCE
Status: CANCELLED | OUTPATIENT
Start: 2024-07-24 | End: 2024-07-24

## 2024-07-24 RX ORDER — LIDOCAINE HYDROCHLORIDE 40 MG/ML
SOLUTION TOPICAL ONCE
Status: CANCELLED | OUTPATIENT
Start: 2024-07-24 | End: 2024-07-24

## 2024-07-24 RX ORDER — GENTAMICIN SULFATE 1 MG/G
OINTMENT TOPICAL ONCE
Status: CANCELLED | OUTPATIENT
Start: 2024-07-24 | End: 2024-07-24

## 2024-07-24 RX ORDER — LIDOCAINE 50 MG/G
OINTMENT TOPICAL ONCE
Status: CANCELLED | OUTPATIENT
Start: 2024-07-24 | End: 2024-07-24

## 2024-07-24 RX ORDER — TRIAMCINOLONE ACETONIDE 1 MG/G
OINTMENT TOPICAL ONCE
Status: CANCELLED | OUTPATIENT
Start: 2024-07-24 | End: 2024-07-24

## 2024-07-24 RX ORDER — CLOBETASOL PROPIONATE 0.5 MG/G
OINTMENT TOPICAL ONCE
Status: CANCELLED | OUTPATIENT
Start: 2024-07-24 | End: 2024-07-24

## 2024-07-24 RX ORDER — BETAMETHASONE DIPROPIONATE 0.5 MG/G
CREAM TOPICAL ONCE
Status: CANCELLED | OUTPATIENT
Start: 2024-07-24 | End: 2024-07-24
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
## 2024-07-24 NOTE — PATIENT INSTRUCTIONS
PHYSICIAN ORDERS AND DISCHARGE INSTRUCTIONS    NOTE: Upon discharge from the Wound Center, you will receive a patient experience survey. We would be grateful if you would take the time to fill this survey out.    Wound care order history:     DRU's   Right       Left    Date:   Cultures:     Grafts:     Antibiotics:        Continuing wound care orders and information:                Residence:                Continue home health care with:    Your wound-care supplies will be provided by:     Wound cleansing:     Do not scrub or use excessive force.   Wash hands with soap and water before and after dressing changes.  Prior to applying a clean dressing, cleanse wound with normal saline, wound cleanser, or mild soap and water.   Ask the physician or nurse before getting the wound(s) wet in a shower.    General Wound management:   Keep weight off wounds and reposition every 2 hours.   Avoid standing for long periods of time.   Apply wraps/stockings in AM and remove at bedtime.  If swelling is present, elevate legs to the level of the heart or above for 30 minutes 4-5 times a day and/or when sitting.  When taking antibiotics take entire prescription as ordered by physician do not stop taking until medicine is all gone.   Increase protein intake to promote healing.         Orders for this week: 7/24/2024    Rx:    Right Anterior Lower Leg Wound - Healed 7/24/24.  Keep lower extremities moisturized.  Wear Tubi F today in clinic.   Look into getting compression stockings in the 20-30mmHg range.    Dispense 30 day quantity when ordering supplies.    Discharged from the wound care center 7/24/24  Call  for any questions or concerns.  Date__________   Time____________

## 2024-07-24 NOTE — PLAN OF CARE
Problem: Wound:  Goal: Will show signs of wound healing; wound closure and no evidence of infection  Description: Will show signs of wound healing; wound closure and no evidence of infection  Outcome: Adequate for Discharge

## 2024-08-29 DIAGNOSIS — J45.40 MODERATE PERSISTENT ASTHMA WITHOUT COMPLICATION: ICD-10-CM

## 2024-08-29 RX ORDER — VALACYCLOVIR HYDROCHLORIDE 1 G/1
1000 TABLET, FILM COATED ORAL 2 TIMES DAILY
COMMUNITY
End: 2024-09-10 | Stop reason: SDUPTHER

## 2024-08-30 RX ORDER — VALACYCLOVIR HYDROCHLORIDE 1 G/1
1000 TABLET, FILM COATED ORAL 2 TIMES DAILY
OUTPATIENT
Start: 2024-08-30

## 2024-08-30 RX ORDER — ALBUTEROL SULFATE 90 UG/1
2 INHALANT RESPIRATORY (INHALATION) EVERY 6 HOURS PRN
Qty: 18 G | Refills: 0 | OUTPATIENT
Start: 2024-08-30

## 2024-09-06 ENCOUNTER — TELEPHONE (OUTPATIENT)
Dept: FAMILY MEDICINE CLINIC | Age: 49
End: 2024-09-06

## 2024-09-06 DIAGNOSIS — B00.9 HERPES: Primary | ICD-10-CM

## 2024-09-10 RX ORDER — VALACYCLOVIR HYDROCHLORIDE 1 G/1
1000 TABLET, FILM COATED ORAL 2 TIMES DAILY
Qty: 60 TABLET | Refills: 0 | Status: SHIPPED | OUTPATIENT
Start: 2024-09-10

## 2024-09-11 ENCOUNTER — OFFICE VISIT (OUTPATIENT)
Dept: FAMILY MEDICINE CLINIC | Age: 49
End: 2024-09-11
Payer: COMMERCIAL

## 2024-09-11 VITALS
RESPIRATION RATE: 18 BRPM | OXYGEN SATURATION: 98 % | SYSTOLIC BLOOD PRESSURE: 136 MMHG | HEART RATE: 87 BPM | DIASTOLIC BLOOD PRESSURE: 84 MMHG | WEIGHT: 266 LBS | BODY MASS INDEX: 38.08 KG/M2 | HEIGHT: 70 IN

## 2024-09-11 DIAGNOSIS — B00.9 HERPES: ICD-10-CM

## 2024-09-11 DIAGNOSIS — N20.0 KIDNEY STONES: Primary | ICD-10-CM

## 2024-09-11 DIAGNOSIS — J45.40 MODERATE PERSISTENT ASTHMA WITHOUT COMPLICATION: ICD-10-CM

## 2024-09-11 PROCEDURE — G8417 CALC BMI ABV UP PARAM F/U: HCPCS | Performed by: PHYSICIAN ASSISTANT

## 2024-09-11 PROCEDURE — G2211 COMPLEX E/M VISIT ADD ON: HCPCS | Performed by: PHYSICIAN ASSISTANT

## 2024-09-11 PROCEDURE — 3075F SYST BP GE 130 - 139MM HG: CPT | Performed by: PHYSICIAN ASSISTANT

## 2024-09-11 PROCEDURE — 4004F PT TOBACCO SCREEN RCVD TLK: CPT | Performed by: PHYSICIAN ASSISTANT

## 2024-09-11 PROCEDURE — G8427 DOCREV CUR MEDS BY ELIG CLIN: HCPCS | Performed by: PHYSICIAN ASSISTANT

## 2024-09-11 PROCEDURE — 99214 OFFICE O/P EST MOD 30 MIN: CPT | Performed by: PHYSICIAN ASSISTANT

## 2024-09-11 PROCEDURE — 3079F DIAST BP 80-89 MM HG: CPT | Performed by: PHYSICIAN ASSISTANT

## 2024-09-11 RX ORDER — ACYCLOVIR 400 MG/1
400 TABLET ORAL 3 TIMES DAILY
Qty: 21 TABLET | Refills: 0 | Status: SHIPPED | OUTPATIENT
Start: 2024-09-11 | End: 2024-09-18

## 2024-09-11 RX ORDER — VALACYCLOVIR HYDROCHLORIDE 1 G/1
1000 TABLET, FILM COATED ORAL 2 TIMES DAILY
Qty: 60 TABLET | Refills: 0 | Status: CANCELLED | OUTPATIENT
Start: 2024-09-11

## 2024-09-11 RX ORDER — ALBUTEROL SULFATE 90 UG/1
2 AEROSOL, METERED RESPIRATORY (INHALATION) EVERY 4 HOURS PRN
Qty: 18 G | Refills: 0 | Status: SHIPPED | OUTPATIENT
Start: 2024-09-11

## 2024-09-11 ASSESSMENT — PATIENT HEALTH QUESTIONNAIRE - PHQ9
SUM OF ALL RESPONSES TO PHQ QUESTIONS 1-9: 16
SUM OF ALL RESPONSES TO PHQ QUESTIONS 1-9: 16
4. FEELING TIRED OR HAVING LITTLE ENERGY: MORE THAN HALF THE DAYS
6. FEELING BAD ABOUT YOURSELF - OR THAT YOU ARE A FAILURE OR HAVE LET YOURSELF OR YOUR FAMILY DOWN: SEVERAL DAYS
1. LITTLE INTEREST OR PLEASURE IN DOING THINGS: MORE THAN HALF THE DAYS
3. TROUBLE FALLING OR STAYING ASLEEP: MORE THAN HALF THE DAYS
SUM OF ALL RESPONSES TO PHQ QUESTIONS 1-9: 16
5. POOR APPETITE OR OVEREATING: MORE THAN HALF THE DAYS
SUM OF ALL RESPONSES TO PHQ9 QUESTIONS 1 & 2: 3
SUM OF ALL RESPONSES TO PHQ QUESTIONS 1-9: 16
9. THOUGHTS THAT YOU WOULD BE BETTER OFF DEAD, OR OF HURTING YOURSELF: NOT AT ALL
2. FEELING DOWN, DEPRESSED OR HOPELESS: SEVERAL DAYS
10. IF YOU CHECKED OFF ANY PROBLEMS, HOW DIFFICULT HAVE THESE PROBLEMS MADE IT FOR YOU TO DO YOUR WORK, TAKE CARE OF THINGS AT HOME, OR GET ALONG WITH OTHER PEOPLE: NOT DIFFICULT AT ALL
7. TROUBLE CONCENTRATING ON THINGS, SUCH AS READING THE NEWSPAPER OR WATCHING TELEVISION: NEARLY EVERY DAY
8. MOVING OR SPEAKING SO SLOWLY THAT OTHER PEOPLE COULD HAVE NOTICED. OR THE OPPOSITE, BEING SO FIGETY OR RESTLESS THAT YOU HAVE BEEN MOVING AROUND A LOT MORE THAN USUAL: NEARLY EVERY DAY

## 2024-09-20 ENCOUNTER — HOSPITAL ENCOUNTER (OUTPATIENT)
Dept: CT IMAGING | Age: 49
Discharge: HOME | End: 2024-09-20
Payer: COMMERCIAL

## 2024-09-20 DIAGNOSIS — N20.0 KIDNEY STONES: ICD-10-CM

## 2024-09-20 PROCEDURE — 74176 CT ABD & PELVIS W/O CONTRAST: CPT

## 2024-09-24 ENCOUNTER — TELEPHONE (OUTPATIENT)
Dept: FAMILY MEDICINE CLINIC | Age: 49
End: 2024-09-24

## 2024-09-26 ENCOUNTER — TELEPHONE (OUTPATIENT)
Dept: FAMILY MEDICINE CLINIC | Age: 49
End: 2024-09-26

## 2024-09-26 DIAGNOSIS — N20.0 KIDNEY STONES: Primary | ICD-10-CM

## 2024-11-18 ENCOUNTER — HOSPITAL ENCOUNTER (EMERGENCY)
Age: 49
Discharge: HOME | End: 2024-11-18
Attending: STUDENT IN AN ORGANIZED HEALTH CARE EDUCATION/TRAINING PROGRAM
Payer: COMMERCIAL

## 2024-11-18 VITALS
HEIGHT: 71 IN | HEART RATE: 94 BPM | OXYGEN SATURATION: 96 % | RESPIRATION RATE: 17 BRPM | DIASTOLIC BLOOD PRESSURE: 81 MMHG | BODY MASS INDEX: 36.4 KG/M2 | TEMPERATURE: 98.3 F | SYSTOLIC BLOOD PRESSURE: 135 MMHG | WEIGHT: 260 LBS

## 2024-11-18 DIAGNOSIS — H10.503 BLEPHAROCONJUNCTIVITIS OF BOTH EYES, UNSPECIFIED BLEPHAROCONJUNCTIVITIS TYPE: Primary | ICD-10-CM

## 2024-11-18 PROCEDURE — 6370000000 HC RX 637 (ALT 250 FOR IP): Performed by: STUDENT IN AN ORGANIZED HEALTH CARE EDUCATION/TRAINING PROGRAM

## 2024-11-18 PROCEDURE — 99283 EMERGENCY DEPT VISIT LOW MDM: CPT

## 2024-11-18 RX ORDER — CIPROFLOXACIN HYDROCHLORIDE 3.5 MG/ML
2 SOLUTION/ DROPS TOPICAL
Status: COMPLETED | OUTPATIENT
Start: 2024-11-18 | End: 2024-11-18

## 2024-11-18 RX ORDER — CIPROFLOXACIN HYDROCHLORIDE 3.5 MG/ML
1 SOLUTION/ DROPS TOPICAL
Qty: 5 ML | Refills: 0 | Status: SHIPPED | OUTPATIENT
Start: 2024-11-18 | End: 2024-11-25

## 2024-11-18 RX ADMIN — CIPROFLOXACIN 2 DROP: 3 SOLUTION OPHTHALMIC at 04:42

## 2024-11-18 ASSESSMENT — PAIN SCALES - GENERAL: PAINLEVEL_OUTOF10: 3

## 2024-11-18 ASSESSMENT — PAIN DESCRIPTION - ORIENTATION: ORIENTATION: LEFT;RIGHT

## 2024-11-18 ASSESSMENT — PAIN DESCRIPTION - DESCRIPTORS: DESCRIPTORS: DISCOMFORT

## 2024-11-18 ASSESSMENT — PAIN DESCRIPTION - PAIN TYPE: TYPE: ACUTE PAIN

## 2024-11-18 ASSESSMENT — PAIN DESCRIPTION - LOCATION: LOCATION: EYE

## 2024-11-18 ASSESSMENT — PAIN - FUNCTIONAL ASSESSMENT: PAIN_FUNCTIONAL_ASSESSMENT: 0-10

## 2024-11-18 NOTE — ED NOTES
Reviewed AVS with pt. Pt verbalized understanding of Rx , dosage, usage, and follow up. Pt ambulatory in no apparent distress out of ED with significant other. Pt expresses no other needs at this time.

## 2024-11-18 NOTE — DISCHARGE INSTRUCTIONS
Call and schedule follow-up appointment with ophthalmology in the next week.  Return to emergency department if you develop new or worsening symptoms.
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
## 2024-11-20 ENCOUNTER — OFFICE VISIT (OUTPATIENT)
Dept: FAMILY MEDICINE CLINIC | Age: 49
End: 2024-11-20

## 2024-11-20 VITALS
HEIGHT: 71 IN | HEART RATE: 96 BPM | SYSTOLIC BLOOD PRESSURE: 138 MMHG | WEIGHT: 276 LBS | DIASTOLIC BLOOD PRESSURE: 88 MMHG | OXYGEN SATURATION: 98 % | BODY MASS INDEX: 38.64 KG/M2 | RESPIRATION RATE: 18 BRPM

## 2024-11-20 DIAGNOSIS — H10.503 BLEPHAROCONJUNCTIVITIS OF BOTH EYES, UNSPECIFIED BLEPHAROCONJUNCTIVITIS TYPE: Primary | ICD-10-CM

## 2024-11-20 ASSESSMENT — PATIENT HEALTH QUESTIONNAIRE - PHQ9
SUM OF ALL RESPONSES TO PHQ QUESTIONS 1-9: 19
6. FEELING BAD ABOUT YOURSELF - OR THAT YOU ARE A FAILURE OR HAVE LET YOURSELF OR YOUR FAMILY DOWN: SEVERAL DAYS
2. FEELING DOWN, DEPRESSED OR HOPELESS: MORE THAN HALF THE DAYS
8. MOVING OR SPEAKING SO SLOWLY THAT OTHER PEOPLE COULD HAVE NOTICED. OR THE OPPOSITE, BEING SO FIGETY OR RESTLESS THAT YOU HAVE BEEN MOVING AROUND A LOT MORE THAN USUAL: SEVERAL DAYS
5. POOR APPETITE OR OVEREATING: NEARLY EVERY DAY
7. TROUBLE CONCENTRATING ON THINGS, SUCH AS READING THE NEWSPAPER OR WATCHING TELEVISION: NEARLY EVERY DAY
1. LITTLE INTEREST OR PLEASURE IN DOING THINGS: NEARLY EVERY DAY
3. TROUBLE FALLING OR STAYING ASLEEP: NEARLY EVERY DAY
SUM OF ALL RESPONSES TO PHQ9 QUESTIONS 1 & 2: 5
10. IF YOU CHECKED OFF ANY PROBLEMS, HOW DIFFICULT HAVE THESE PROBLEMS MADE IT FOR YOU TO DO YOUR WORK, TAKE CARE OF THINGS AT HOME, OR GET ALONG WITH OTHER PEOPLE: VERY DIFFICULT
SUM OF ALL RESPONSES TO PHQ QUESTIONS 1-9: 19
4. FEELING TIRED OR HAVING LITTLE ENERGY: NEARLY EVERY DAY
SUM OF ALL RESPONSES TO PHQ QUESTIONS 1-9: 19
9. THOUGHTS THAT YOU WOULD BE BETTER OFF DEAD, OR OF HURTING YOURSELF: NOT AT ALL
SUM OF ALL RESPONSES TO PHQ QUESTIONS 1-9: 19

## 2024-11-20 NOTE — PATIENT INSTRUCTIONS
Address: [address] [place] [address], [place] [address]  Hours: Open ? Closes 4:30?PM  Phone: [phone]    Welcome to [facility] :    Did you know we now have a faster way for you to move through your appointment? For your convenience, we now have digital registration available. When you schedule your next appointment, you will receive a link via your email as well as a text message that will allow you to complete any paperwork digitally before your appointment. We are committed to providing you the best care possible.    If you receive a survey after visiting one of our offices, please take time to share your experience concerning your physician office visit.  These surveys are confidential and no health information about you is shared.    We are eager to improve for you and continue to give you satisfactory care, we are counting on your feedback to help make that happen.
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
## 2024-12-10 ENCOUNTER — HOSPITAL ENCOUNTER (EMERGENCY)
Age: 49
Discharge: HOME | End: 2024-12-10
Payer: COMMERCIAL

## 2024-12-10 ENCOUNTER — APPOINTMENT (OUTPATIENT)
Dept: GENERAL RADIOLOGY | Age: 49
End: 2024-12-10
Payer: COMMERCIAL

## 2024-12-10 VITALS
DIASTOLIC BLOOD PRESSURE: 80 MMHG | OXYGEN SATURATION: 93 % | WEIGHT: 275 LBS | HEART RATE: 89 BPM | HEIGHT: 71 IN | TEMPERATURE: 99.2 F | BODY MASS INDEX: 38.5 KG/M2 | RESPIRATION RATE: 18 BRPM | SYSTOLIC BLOOD PRESSURE: 136 MMHG

## 2024-12-10 DIAGNOSIS — J18.9 PNEUMONIA DUE TO INFECTIOUS ORGANISM, UNSPECIFIED LATERALITY, UNSPECIFIED PART OF LUNG: Primary | ICD-10-CM

## 2024-12-10 LAB
ALBUMIN SERPL-MCNC: 4.5 G/DL (ref 3.4–5)
ALBUMIN/GLOB SERPL: 1.4 {RATIO} (ref 1.1–2.2)
ALP SERPL-CCNC: 107 U/L (ref 40–129)
ALT SERPL-CCNC: 40 U/L (ref 10–40)
ANION GAP SERPL CALCULATED.3IONS-SCNC: 11 MMOL/L (ref 9–17)
ARTERIAL PATENCY WRIST A: NORMAL
AST SERPL-CCNC: 30 U/L (ref 15–37)
BASOPHILS # BLD: 0.04 K/UL
BASOPHILS NFR BLD: 0 % (ref 0–1)
BILIRUB SERPL-MCNC: 0.3 MG/DL (ref 0–1)
BNP SERPL-MCNC: <36 PG/ML (ref 0–125)
BODY TEMPERATURE: 37
BUN SERPL-MCNC: 11 MG/DL (ref 7–20)
CALCIUM SERPL-MCNC: 9.5 MG/DL (ref 8.3–10.6)
CHLORIDE SERPL-SCNC: 101 MMOL/L (ref 99–110)
CO2 SERPL-SCNC: 25 MMOL/L (ref 21–32)
COHGB MFR BLD: 0.5 % (ref 0.5–1.5)
CREAT SERPL-MCNC: 0.9 MG/DL (ref 0.9–1.3)
EOSINOPHIL # BLD: 0.06 K/UL
EOSINOPHILS RELATIVE PERCENT: 1 % (ref 0–3)
ERYTHROCYTE [DISTWIDTH] IN BLOOD BY AUTOMATED COUNT: 12.3 % (ref 11.7–14.9)
GFR, ESTIMATED: >90 ML/MIN/1.73M2
GLUCOSE SERPL-MCNC: 123 MG/DL (ref 74–99)
HCO3 VENOUS: 23.2 MMOL/L (ref 22–29)
HCT VFR BLD AUTO: 49.9 % (ref 42–52)
HGB BLD-MCNC: 16.3 G/DL (ref 13.5–18)
IMM GRANULOCYTES # BLD AUTO: 0.04 K/UL
IMM GRANULOCYTES NFR BLD: 0 %
LYMPHOCYTES NFR BLD: 0.88 K/UL
LYMPHOCYTES RELATIVE PERCENT: 10 % (ref 24–44)
MAGNESIUM SERPL-MCNC: 2 MG/DL (ref 1.8–2.4)
MCH RBC QN AUTO: 29.1 PG (ref 27–31)
MCHC RBC AUTO-ENTMCNC: 32.7 G/DL (ref 32–36)
MCV RBC AUTO: 88.9 FL (ref 78–100)
METHEMOGLOBIN: 0.5 % (ref 0.5–1.5)
MONOCYTES NFR BLD: 0.42 K/UL
MONOCYTES NFR BLD: 5 % (ref 0–4)
NEGATIVE BASE EXCESS, VEN: 1.4 MMOL/L (ref 0–3)
NEUTROPHILS NFR BLD: 85 % (ref 36–66)
NEUTS SEG NFR BLD: 7.8 K/UL
OXYHGB MFR BLD: 82.6 %
PCO2 VENOUS: 38.8 MM HG (ref 38–54)
PH VENOUS: 7.39 (ref 7.32–7.43)
PLATELET # BLD AUTO: 309 K/UL (ref 140–440)
PMV BLD AUTO: 9.2 FL (ref 7.5–11.1)
PO2 VENOUS: 46.9 MM HG (ref 23–48)
POTASSIUM SERPL-SCNC: 4.7 MMOL/L (ref 3.5–5.1)
PROT SERPL-MCNC: 7.7 G/DL (ref 6.4–8.2)
RBC # BLD AUTO: 5.61 M/UL (ref 4.6–6.2)
SODIUM SERPL-SCNC: 138 MMOL/L (ref 136–145)
TROPONIN I SERPL HS-MCNC: 6 NG/L (ref 0–22)
TROPONIN I SERPL HS-MCNC: 8 NG/L (ref 0–22)
WBC OTHER # BLD: 9.2 K/UL (ref 4–10.5)

## 2024-12-10 PROCEDURE — 83735 ASSAY OF MAGNESIUM: CPT

## 2024-12-10 PROCEDURE — 93005 ELECTROCARDIOGRAM TRACING: CPT | Performed by: STUDENT IN AN ORGANIZED HEALTH CARE EDUCATION/TRAINING PROGRAM

## 2024-12-10 PROCEDURE — 2580000003 HC RX 258: Performed by: PHYSICIAN ASSISTANT

## 2024-12-10 PROCEDURE — 71045 X-RAY EXAM CHEST 1 VIEW: CPT

## 2024-12-10 PROCEDURE — 84484 ASSAY OF TROPONIN QUANT: CPT

## 2024-12-10 PROCEDURE — 36415 COLL VENOUS BLD VENIPUNCTURE: CPT

## 2024-12-10 PROCEDURE — 96374 THER/PROPH/DIAG INJ IV PUSH: CPT

## 2024-12-10 PROCEDURE — 83880 ASSAY OF NATRIURETIC PEPTIDE: CPT

## 2024-12-10 PROCEDURE — 94640 AIRWAY INHALATION TREATMENT: CPT

## 2024-12-10 PROCEDURE — 99285 EMERGENCY DEPT VISIT HI MDM: CPT

## 2024-12-10 PROCEDURE — 82805 BLOOD GASES W/O2 SATURATION: CPT

## 2024-12-10 PROCEDURE — 80053 COMPREHEN METABOLIC PANEL: CPT

## 2024-12-10 PROCEDURE — 6370000000 HC RX 637 (ALT 250 FOR IP): Performed by: PHYSICIAN ASSISTANT

## 2024-12-10 PROCEDURE — 6360000002 HC RX W HCPCS: Performed by: PHYSICIAN ASSISTANT

## 2024-12-10 PROCEDURE — 85025 COMPLETE CBC W/AUTO DIFF WBC: CPT

## 2024-12-10 RX ORDER — BENZONATATE 100 MG/1
100 CAPSULE ORAL 2 TIMES DAILY PRN
Qty: 20 CAPSULE | Refills: 0 | Status: SHIPPED | OUTPATIENT
Start: 2024-12-10 | End: 2024-12-17

## 2024-12-10 RX ORDER — IPRATROPIUM BROMIDE AND ALBUTEROL SULFATE 2.5; .5 MG/3ML; MG/3ML
1 SOLUTION RESPIRATORY (INHALATION) ONCE
Status: COMPLETED | OUTPATIENT
Start: 2024-12-10 | End: 2024-12-10

## 2024-12-10 RX ADMIN — METHYLPREDNISOLONE SODIUM SUCCINATE 125 MG: 125 INJECTION INTRAMUSCULAR; INTRAVENOUS at 18:59

## 2024-12-10 RX ADMIN — IPRATROPIUM BROMIDE AND ALBUTEROL SULFATE 1 DOSE: 2.5; .5 SOLUTION RESPIRATORY (INHALATION) at 19:12

## 2024-12-10 ASSESSMENT — LIFESTYLE VARIABLES
HOW OFTEN DO YOU HAVE A DRINK CONTAINING ALCOHOL: NEVER
HOW MANY STANDARD DRINKS CONTAINING ALCOHOL DO YOU HAVE ON A TYPICAL DAY: PATIENT DOES NOT DRINK

## 2024-12-10 ASSESSMENT — PAIN - FUNCTIONAL ASSESSMENT: PAIN_FUNCTIONAL_ASSESSMENT: NONE - DENIES PAIN

## 2024-12-11 LAB
EKG ATRIAL RATE: 99 BPM
EKG DIAGNOSIS: NORMAL
EKG P-R INTERVAL: 168 MS
EKG Q-T INTERVAL: 338 MS
EKG QRS DURATION: 82 MS
EKG QTC CALCULATION (BAZETT): 433 MS
EKG R AXIS: 32 DEGREES
EKG T AXIS: 56 DEGREES
EKG VENTRICULAR RATE: 99 BPM

## 2024-12-11 PROCEDURE — 93010 ELECTROCARDIOGRAM REPORT: CPT | Performed by: INTERNAL MEDICINE

## 2024-12-11 NOTE — ED PROVIDER NOTES
Vital Sign     Worried About Running Out of Food in the Last Year: Never true     Ran Out of Food in the Last Year: Never true   Transportation Needs: Unknown (4/9/2024)    PRAPARE - Transportation     Lack of Transportation (Non-Medical): No   Physical Activity: Inactive (9/8/2022)    Exercise Vital Sign     Days of Exercise per Week: 0 days     Minutes of Exercise per Session: 0 min   Stress: Stress Concern Present (9/8/2022)    [employer] - Occupational Stress Questionnaire     Feeling of Stress : Rather much   Social Connections: Moderately Integrated (9/8/2022)    Social Connection and Isolation Panel [NHANES]     Frequency of Communication with Friends and Family: More than three times a week     Frequency of Social Gatherings with Friends and Family: More than three times a week     Attends Yazdanism Services: More than 4 times per year     Active Member of Clubs or Organizations: Yes     Attends Club or Organization Meetings: More than 4 times per year     Marital Status:    Intimate Partner Violence: Not At Risk (9/8/2022)    Humiliation, Afraid, Rape, and Kick questionnaire     Fear of Current or Ex-Partner: No     Emotionally Abused: No     Physically Abused: No     Sexually Abused: No   Housing Stability: Unknown (4/9/2024)    Housing Stability Vital Sign     Unstable Housing in the Last Year: No       SCREENINGS    [place] Coma Scale  Eye Opening: Spontaneous  Best Verbal Response: Oriented  Best Motor Response: Obeys commands  Francis Coma Scale Score: 15      PHYSICAL EXAM       ED Triage Vitals   BP Systolic BP Percentile Diastolic BP Percentile Temp Temp src Pulse Respirations SpO2   12/10/24 1717 -- -- 12/10/24 1717 -- 12/10/24 1717 12/10/24 1717 12/10/24 1717   (!) 153/96   99.2 °F (37.3 °C)  (!) 104 20 90 %      Height Weight - Scale         12/10/24 1748 12/10/24 1748         1.803 m (5' 11\") 124.7 kg (275 lb)            GENERAL APPEARANCE:  Well-developed,

## 2025-01-31 ENCOUNTER — TELEPHONE (OUTPATIENT)
Dept: FAMILY MEDICINE CLINIC | Age: 50
End: 2025-01-31

## 2025-01-31 NOTE — TELEPHONE ENCOUNTER
Spoke with pt and he advised that he is currently incarcerated for 4 months and has c/o cough, congestion, wheezing and sweating. States that he has asthma and gets bronchitis . Wanting to know if Yakov would send in ATB and steroids to [facility] on [address]. Pt gave verbal that we can speak with his GF Cyndi Matamoros at [phone]

## 2025-01-31 NOTE — TELEPHONE ENCOUNTER
Spoke with Cyndi and advised of tatyana's message. Cyndi verbalized understanding. No other questions or concerns.

## 2025-03-03 ENCOUNTER — COMMUNITY OUTREACH (OUTPATIENT)
Dept: FAMILY MEDICINE CLINIC | Age: 50
End: 2025-03-03

## 2025-07-01 ENCOUNTER — HOSPITAL ENCOUNTER (OUTPATIENT)
Dept: PSYCHIATRY | Age: 50
Discharge: HOME | End: 2025-07-01

## 2025-07-02 ENCOUNTER — HOSPITAL ENCOUNTER (OUTPATIENT)
Dept: PSYCHIATRY | Age: 50
Setting detail: THERAPIES SERIES
Discharge: HOME | End: 2025-07-02
Payer: MEDICARE

## 2025-07-02 PROCEDURE — 99484 CARE MGMT SVC BHVL HLTH COND: CPT

## 2025-07-02 PROCEDURE — 80305 DRUG TEST PRSMV DIR OPT OBS: CPT

## 2025-07-02 PROCEDURE — 90791 PSYCH DIAGNOSTIC EVALUATION: CPT

## 2025-07-02 ASSESSMENT — PATIENT HEALTH QUESTIONNAIRE - PHQ9
SUM OF ALL RESPONSES TO PHQ QUESTIONS 1-9: 0
1. LITTLE INTEREST OR PLEASURE IN DOING THINGS: NOT AT ALL
SUM OF ALL RESPONSES TO PHQ QUESTIONS 1-9: 0
2. FEELING DOWN, DEPRESSED OR HOPELESS: NOT AT ALL

## 2025-07-02 ASSESSMENT — ANXIETY QUESTIONNAIRES
3. WORRYING TOO MUCH ABOUT DIFFERENT THINGS: NOT AT ALL
5. BEING SO RESTLESS THAT IT IS HARD TO SIT STILL: NOT AT ALL
6. BECOMING EASILY ANNOYED OR IRRITABLE: NOT AT ALL
2. NOT BEING ABLE TO STOP OR CONTROL WORRYING: NOT AT ALL
4. TROUBLE RELAXING: NOT AT ALL
7. FEELING AFRAID AS IF SOMETHING AWFUL MIGHT HAPPEN: NOT AT ALL
IF YOU CHECKED OFF ANY PROBLEMS ON THIS QUESTIONNAIRE, HOW DIFFICULT HAVE THESE PROBLEMS MADE IT FOR YOU TO DO YOUR WORK, TAKE CARE OF THINGS AT HOME, OR GET ALONG WITH OTHER PEOPLE: NOT DIFFICULT AT ALL
GAD7 TOTAL SCORE: 0
1. FEELING NERVOUS, ANXIOUS, OR ON EDGE: NOT AT ALL

## 2025-07-02 NOTE — PROGRESS NOTES
[facility] TREAT[facility] PLAN      Location: [x] Portal [] [place]    Treatment plan: Extended Care    Strengths: Good Communicator    Weakness/Limitations: Traumatic Brain Injury    Service/Frequency/Duration: Case Management as needed    Diagnosis: F10.20 Other and unspecified alcohol dependence/unspecified drinking behavior    Level of Care: 1 Outpatient Services    Problem: Client lacks food resources   Goal: Apply for SNAP x 90 days   Objectives:   1) Complete application x 90 days Evaluation Date: 10/2/25 Code: A Achieved Date Achieved: 7/2/2025 Completed SNAP application on 7/2/25  2) Provide income verification documents to [employer] x 90 days  Evaluation Date: 10/2/25 Code: C Continue TBD  3) Provide letter describing living arrangements to [employer] x 90 days  Evaluation Date: 10/2/25 Code: C Continue TBD    Defer: 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      Discharge Plan/Instructions: Follow through with case management recommendations.      Pavan Becerra / 1975 has participated in the treatment plan development outlined above on 7/2/2025.     ABDI GrierIII  7/2/2025/2:51 PM

## 2025-07-02 NOTE — PROGRESS NOTES
[facility]     PHYSICIAN ORDER  &  LABORATORY TESTING  &       CLINICAL DIAGNOSTIC SUMMARY             Location: [x] [place] [] [place]                   Patient Name: Pavan Becerra   : 1975     Case # :  [id number]  Therapist: ABDI DiazIII    Diagnostic Summary   F10.20 Other and unspecified alcohol dependence/unspecified drinking behavior   F15.20 Amphetamine and other psychostimulant dependence-unspecified use    Alcohol-17 client reports drinking at parties 1 time month, 20's sober At 33 start drinking daily 12 pack for 3 years and got sober. Client reports last use in     Methamphetamine-43 started snorting meth several times a week, By 45 using daily (1 gram a week) started smoking 46 and got really bad 2 grams weekly Client reports last use in may of 2024    PROBLEM STATEMENT: Client relapsed on Meth in May 2024 and failed UDS for probation. Client looking for aftercare after [facility].          IDENTIFYING INFORMATION:  Pavan Becerra / 1975         Client is a 49 year old  male on probation with [facility]. Client is unemployed and gets SSDI from a TBI. Client reports he is engaged and has 2 adult children. He shared he lost his motherin May of 2024 and used Meth. He tested positive for probation in 2024 and was sentenced to [facility]. He stayed 2025-2025 and completed.     2.  IMPAIRED CONTROL :     Alcohol-1. \"i drank solid for years\". 2. \"I tried to stop several times\". 4. \"I would be thinking about drinking in the middle of the day\".     Meth- 1.\"I never thought I would use meth again\". 2. \"I would try and not use but I needed energy\" 3. \"I was high all day everyday\".     3. SOCIAL IMPAIRMENT:     Alcohol-6. \"I drank even after I had terrible things happen\".   Meth-  6. \"even on probation I used due to my mom dying\".      4. RISKY USE:     Alcohol-9. \"I have depression and TBI and alcohol doesn't help\"   Meth- 9. \"my

## 2025-07-02 NOTE — PROGRESS NOTES
[facility]        Individual  Progress Note    Location: [x] [place] [] [place]                   Patient Name: Pavan Becerra   : 1975     Case # :  [id number]  Therapist: URVASHI Diaz        Objective/Service/Time: 1:00-1:55 PM Assessment    S- Client is a 49 year old  male on probation with [facility]. Client is unemployed and gets SSDI from a TBI. Client reports he is engaged and has 2 adult children. He shared he lost his motherin May of 2024 and used Meth. He tested positive for probation in 2024 and was sentenced to [facility]. He stayed 2025-2025 and completed.     O-Client was pleasant, his thought process was logical, his mood euthymic,  his affect full and speech normal. Client denies any hallucinations or delusions. Client denies any HI/SI.    A-Completed intake paperwork, began assessment and administered initial UDS. Client met criteria for level 1 treatment.     P-Client will start group 25. He will complete assessment on 2025.         Electronically signed by URVASHI Diaz on 2025 at 2:04 PM

## 2025-07-07 ENCOUNTER — HOSPITAL ENCOUNTER (OUTPATIENT)
Dept: PSYCHIATRY | Age: 50
Setting detail: THERAPIES SERIES
Discharge: HOME | End: 2025-07-07
Payer: MEDICARE

## 2025-07-07 PROCEDURE — 90834 PSYTX W PT 45 MINUTES: CPT

## 2025-07-07 PROCEDURE — 90853 GROUP PSYCHOTHERAPY: CPT

## 2025-07-07 NOTE — PROGRESS NOTES
[facility]        Individual  Progress Note    Location: [x] [place] [] [place]                   Patient Name: Pavan Becerra   : 1975     Case # :  [id number]  Therapist: URVAHSI Diaz        Objective/Service/Time: 1:00-1:55 PM individual     S-Client is a 49 year old  male on probation with [facility]. Client is unemployed and gets SSDI from a TBI. Client reports he is engaged and has 2 adult children. He shared he lost his motherin May of 2024 and used Meth. He tested positive for probation in 2024 and was sentenced to [facility]. He stayed 2025-2025 and completed.     O-Client was cooperative, pleasant and oriented x 4. He shared his thoughts and feelings openly.    A-Completed assessment, reviewed UDS that was negative for all substances and created a treatment plan. Client will attend group at 4 PM today.    P-Continue services.         Electronically signed by URVASHI Diaz on 2025 at 1:54 PM

## 2025-07-07 NOTE — PROGRESS NOTES
[facility]REAT[facility]      Location: [x] [place] [] [place]    Treatment plan: Initial    Strengths: communication and handy with fixing things    Weakness/Limitations: using humor to deflect     Service/Frequency/Duration: Individual 1 a week for 90 days, Group assigned 1 a week for 90 days, Urinalysis random monthly for 90 days, AA/NA 1-2 weekly for 90 days, and Case Management as needed    Diagnosis: F10.20 Other and unspecified alcohol dependence/unspecified drinking behavior and F15.20 Amphetamine and other psychostimulant dependence-unspecified use    Level of Care: 1 Outpatient Services    Problem: History of Substance use       Goal: Enhance personalized knowledge and insight associated with mood altering substances x 90 days         Objectives:        1) Remind 2 to 6 detrimental consequences in major life areas regarding substance use in 90 days Evaluation Date: 07/7/25 Code: C Continue TBD         2) Identify 4 to 8 psychological or physiological benefits / gratitude's due to remaining substance free in 90 days: Evaluation Date: 07/7/25 Code: C Continue TBD         3) Identify and explain 2 to 4 explanations about relapse triggers. Explain 2 to 4 things about relapse. Identify 2 to 4 differences and similarities between internal and external triggers associated with substance use in 90 days and Evaluation Date: 07/7/25 Code:  Continue TBD            2.    Problem: Limited knowledge regarding disease concept and substance use.         Goal:  Enhance knowledge and understanding regarding disease concept associated with substance use.         Objectives:      1) Complete 2 to 4 assignments regarding biography of substance use, include onset, frequency, tolerance and amounts and in 90 days:  Evaluation Date: 07/7/25 Code: Code: C Continue TBD        2) Complete assignment on difference between substance abuse, substance dependence and disease concept of substance use in 90 days Evaluation Date:

## 2025-07-08 NOTE — GROUP NOTE
[facility] Group Therapy Note      7/8/2025    Location:  [facility]      Clients Presents: 1808, 1231, 1742, 1810, 1806, 1812, 1831    Clients Absent: 1808, 1173, 1789      Length of session: 1.5 hours      Group Note: OP        Group Type: Co-Ed        New members were welcomed and introduced.  Norms and expectations of group were discussed.        Content: Understanding Anger Management; Relapse and Substance Use           KELVIN Aguirre  7/8/2025 10:03 AM        Co-Therapist: N/A      [facility] Individual Group Progress Note    Pavan SAMPSON Mariam  1975 7/8/2025    Notes on Client Progress in Group      Pavan attended group, introduced himself and events leading to arrival: presented check in information: identified anger avoidance.           KELVIN Aguirre  7/8/2025 10:27 AM        Co-Therapist: N/A

## 2025-07-10 DIAGNOSIS — B00.9 HERPES: ICD-10-CM

## 2025-07-11 RX ORDER — VALACYCLOVIR HYDROCHLORIDE 1 G/1
1000 TABLET, FILM COATED ORAL 2 TIMES DAILY
Qty: 60 TABLET | Refills: 0 | Status: SHIPPED | OUTPATIENT
Start: 2025-07-11

## 2025-07-14 ENCOUNTER — HOSPITAL ENCOUNTER (OUTPATIENT)
Dept: PSYCHIATRY | Age: 50
Setting detail: THERAPIES SERIES
Discharge: HOME | End: 2025-07-14
Payer: MEDICARE

## 2025-07-14 PROCEDURE — 90834 PSYTX W PT 45 MINUTES: CPT

## 2025-07-14 PROCEDURE — 90853 GROUP PSYCHOTHERAPY: CPT

## 2025-07-14 NOTE — PROGRESS NOTES
[facility]        Individual  Progress Note    Location: [x] [place] [] [place]                   Patient Name: Pavan Becerra   : 1975     Case # :  [id number]  Therapist: URVASHI Diaz        Objective/Service/Time: 1: PM Individual     Goal 3 Objective 2 continue    S-Client is a 49 year old  male on probation. Client denies any use or cravings. Client shared he is doing good but still has days where he struggles with his grief. Client stated, \"I have been going to Taoism of course and I talk with the  and it helps. I talk to my girl and my Aunt. I think I wouldn't be doing as well as I am if weren't for my Aunt\". Client shared he is wanting to talk with his father but their relationship has never been very good. Client is planning on writing a list of topics he would like to cover and Counselor agreed.        Electronically signed by URVASHI Diaz on 2025 at 2:54 PM

## 2025-07-14 NOTE — PROGRESS NOTES
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 Explain 2 to 4 things about relapse. Identify 2 to 4 differences and similarities between internal and external triggers associated with substance use in 90 days and Evaluation Date: 07/7/25 Code:  Continue TBD            2.    Problem: Limited knowledge regarding disease concept and substance use.         Goal:  Enhance knowledge and understanding regarding disease concept associated with substance use.         Objectives:      1) Complete 2 to 4 assignments regarding biography of substance use, include onset, frequency, tolerance and amounts and in 90 days:  Evaluation Date: 07/7/25 Code: Code: C Continue TBD        2) Complete assignment on difference between substance abuse, substance dependence and disease concept of substance use in 90 days Evaluation Date:

## 2025-07-15 NOTE — GROUP NOTE
[facility] Group Therapy Note      7/15/2025    Location:  [facility]      Clients Presents: 1806, 1231, 1812, 1831, 1789, 1840      Clients Absent: 1816, 1173, 1742, 1808, 1810      Length of session: 1.5 hours      Group Note: OP        Group Type: Co-Ed        New members were welcomed and introduced.  Norms and expectations of group were discussed.        Content: Understanding Internal and External Stress: Relapse and Substance Use         KELVIN Aguirre  7/15/2025 9:55 AM        Co-Therapist: N/A      [facility] Individual Group Progress Note    Pavan CAMILLA Becerra  1975  7/15/2025    Notes on Client Progress in Group        Pavan introduced himself and events leading to arrival: presented check in information: identified his legal stressors associated with his prior methamphetamine use.           KELVIN Aguirre  7/15/2025 10:08 AM        Co-Therapist: N/A

## 2025-07-17 ENCOUNTER — OFFICE VISIT (OUTPATIENT)
Dept: FAMILY MEDICINE CLINIC | Age: 50
End: 2025-07-17
Payer: COMMERCIAL

## 2025-07-17 VITALS
DIASTOLIC BLOOD PRESSURE: 80 MMHG | HEART RATE: 85 BPM | WEIGHT: 287.7 LBS | BODY MASS INDEX: 40.13 KG/M2 | OXYGEN SATURATION: 97 % | SYSTOLIC BLOOD PRESSURE: 138 MMHG | RESPIRATION RATE: 18 BRPM

## 2025-07-17 DIAGNOSIS — S06.305S: ICD-10-CM

## 2025-07-17 DIAGNOSIS — J45.20 MILD INTERMITTENT ASTHMA WITHOUT COMPLICATION: ICD-10-CM

## 2025-07-17 DIAGNOSIS — Z12.11 COLON CANCER SCREENING: ICD-10-CM

## 2025-07-17 DIAGNOSIS — Z13.6 SCREENING FOR CARDIOVASCULAR CONDITION: ICD-10-CM

## 2025-07-17 DIAGNOSIS — E66.01 MORBID OBESITY WITH BMI OF 40.0-44.9, ADULT (HCC): Primary | ICD-10-CM

## 2025-07-17 DIAGNOSIS — S06.9X5S TRAUMATIC BRAIN INJURY, WITH LOSS OF CONSCIOUSNESS GREATER THAN 24 HOURS WITH RETURN TO PRE-EXISTING CONSCIOUS LEVEL, SEQUELA: ICD-10-CM

## 2025-07-17 PROCEDURE — 3075F SYST BP GE 130 - 139MM HG: CPT | Performed by: PHYSICIAN ASSISTANT

## 2025-07-17 PROCEDURE — 99214 OFFICE O/P EST MOD 30 MIN: CPT | Performed by: PHYSICIAN ASSISTANT

## 2025-07-17 PROCEDURE — 3079F DIAST BP 80-89 MM HG: CPT | Performed by: PHYSICIAN ASSISTANT

## 2025-07-17 PROCEDURE — G2211 COMPLEX E/M VISIT ADD ON: HCPCS | Performed by: PHYSICIAN ASSISTANT

## 2025-07-17 RX ORDER — FLUTICASONE PROPIONATE AND SALMETEROL 250; 50 UG/1; UG/1
1 POWDER RESPIRATORY (INHALATION) 2 TIMES DAILY
Qty: 180 EACH | Refills: 5 | Status: SHIPPED | OUTPATIENT
Start: 2025-07-17

## 2025-07-17 RX ORDER — ALBUTEROL SULFATE 90 UG/1
2 INHALANT RESPIRATORY (INHALATION) 4 TIMES DAILY PRN
Qty: 18 G | Refills: 0 | Status: SHIPPED | OUTPATIENT
Start: 2025-07-17

## 2025-07-17 SDOH — ECONOMIC STABILITY: FOOD INSECURITY: WITHIN THE PAST 12 MONTHS, THE FOOD YOU BOUGHT JUST DIDN'T LAST AND YOU DIDN'T HAVE MONEY TO GET MORE.: NEVER TRUE

## 2025-07-17 SDOH — ECONOMIC STABILITY: FOOD INSECURITY: WITHIN THE PAST 12 MONTHS, YOU WORRIED THAT YOUR FOOD WOULD RUN OUT BEFORE YOU GOT MONEY TO BUY MORE.: NEVER TRUE

## 2025-07-17 ASSESSMENT — PATIENT HEALTH QUESTIONNAIRE - PHQ9
1. LITTLE INTEREST OR PLEASURE IN DOING THINGS: NOT AT ALL
5. POOR APPETITE OR OVEREATING: NOT AT ALL
10. IF YOU CHECKED OFF ANY PROBLEMS, HOW DIFFICULT HAVE THESE PROBLEMS MADE IT FOR YOU TO DO YOUR WORK, TAKE CARE OF THINGS AT HOME, OR GET ALONG WITH OTHER PEOPLE: NOT DIFFICULT AT ALL
2. FEELING DOWN, DEPRESSED OR HOPELESS: NOT AT ALL
SUM OF ALL RESPONSES TO PHQ QUESTIONS 1-9: 0
6. FEELING BAD ABOUT YOURSELF - OR THAT YOU ARE A FAILURE OR HAVE LET YOURSELF OR YOUR FAMILY DOWN: NOT AT ALL
4. FEELING TIRED OR HAVING LITTLE ENERGY: NOT AT ALL
SUM OF ALL RESPONSES TO PHQ QUESTIONS 1-9: 0
3. TROUBLE FALLING OR STAYING ASLEEP: NOT AT ALL
9. THOUGHTS THAT YOU WOULD BE BETTER OFF DEAD, OR OF HURTING YOURSELF: NOT AT ALL
SUM OF ALL RESPONSES TO PHQ QUESTIONS 1-9: 0
SUM OF ALL RESPONSES TO PHQ QUESTIONS 1-9: 0
7. TROUBLE CONCENTRATING ON THINGS, SUCH AS READING THE NEWSPAPER OR WATCHING TELEVISION: NOT AT ALL
8. MOVING OR SPEAKING SO SLOWLY THAT OTHER PEOPLE COULD HAVE NOTICED. OR THE OPPOSITE, BEING SO FIGETY OR RESTLESS THAT YOU HAVE BEEN MOVING AROUND A LOT MORE THAN USUAL: NOT AT ALL
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
## 2025-07-21 ENCOUNTER — HOSPITAL ENCOUNTER (OUTPATIENT)
Dept: PSYCHIATRY | Age: 50
Setting detail: THERAPIES SERIES
Discharge: HOME | End: 2025-07-21
Payer: MEDICARE

## 2025-07-21 PROCEDURE — 90853 GROUP PSYCHOTHERAPY: CPT

## 2025-07-21 PROCEDURE — 90834 PSYTX W PT 45 MINUTES: CPT

## 2025-07-21 NOTE — PROGRESS NOTES
[facility] IBARRA        Individual  Progress Note    Location: [x] [place] [] [place]                   Patient Name: Pavan Becerra   : 1975     Case # :  [id number]  Therapist: URVASHI Diaz        Objective/Service/Time: 1:00-1:55 PM individual     Goal 1 Objective 1 continue    S-Client is a 49 year old  male on probation. Client denies any use or cravings. Client shared he has been watching TV and hanging out with his fiance'. Client shared he read a little of the grief packet from last week but has not filled anything out yet. He stated, \"I have plans to write a list of positive things I remember and the a list of things I am struggling with daily\". Client shared on his [facility] experience stating, \"I thought some of it was good but for the most part too many stupid rules. For example I try to \"be my brothers keeper\" and help out a kid that had no idea of personal hygiene. I got a level 3 which meant I had to sit in the breakfast club and that is not a good thing\".     O-Client was pleasant, cooperative and oriented x 4.     A-Client has good insight into his AoD use and consequences. He is in the action stage of change. Client has good sober support and is involved with his [employer]. Client has been encouraged to continue writing in his journal.     P-Continue services.         Electronically signed by URVASHI Diaz on 2025 at 1:58 PM

## 2025-07-21 NOTE — PROGRESS NOTES
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 Client reports he learned some helpful things.          2) Identify 4 to 8 psychological or physiological benefits / gratitude's due to remaining substance free in 90 days: Evaluation Date: 10/7/25 Code: C Continue TBD         3) Identify and explain 2 to 4 explanations about relapse triggers. Explain 2 to 4 things about relapse. Identify 2 to 4 differences and similarities between internal and external triggers associated with substance use in 90 days and Evaluation Date: 10/7/25 Code:  Continue TBD            2.    Problem: Limited knowledge regarding disease concept and substance use.         Goal:  Enhance knowledge and understanding regarding disease concept associated with substance use.         Objectives:      1) Complete 2 to 4 assignments regarding biography of substance use, include onset, frequency, tolerance and amounts and in 90 days:  Evaluation Date: 10/7/25Code: Code: C Continue TBD        2) Complete assignment on

## 2025-07-22 NOTE — GROUP NOTE
[facility] Group Therapy Note      7/22/2025    Location:  [facility]      Clients Presents: 1846, 1808, 1231, 1173, 1742, 1810, 1812, 1831, 1789, 1840      Clients Absent: 1816      Length of session: 1.5 hours        Group Note: OP      Group Type: Co-Ed        New members were welcomed and introduced.  Norms and expectations of group were discussed.          Content: Understanding Dual Diagnosis: Relapse and Relapse         KELVIN Aguirre  7/22/2025 10:07 AM      Co-Therapist: N/A      [facility] Individual Group Progress Note    Pavan Becerra  1975 7/22/2025    Notes on Client Progress in Group      Pavan arrived to group, introduced himself and events leading to arrival: presented check in information: discussed his history of relapse of methamphetamine.         KELVIN Aguirre  7/22/2025 10:44 AM        Co-Therapist: N/A

## 2025-07-24 ENCOUNTER — TELEPHONE (OUTPATIENT)
Dept: PHYSICAL MEDICINE AND REHAB | Age: 50
End: 2025-07-24

## 2025-07-24 ENCOUNTER — OFFICE VISIT (OUTPATIENT)
Dept: ORTHOPEDIC SURGERY | Age: 50
End: 2025-07-24
Payer: COMMERCIAL

## 2025-07-24 VITALS
OXYGEN SATURATION: 96 % | HEIGHT: 69 IN | WEIGHT: 280 LBS | RESPIRATION RATE: 16 BRPM | BODY MASS INDEX: 41.47 KG/M2 | HEART RATE: 69 BPM

## 2025-07-24 DIAGNOSIS — G56.02 ACUTE CARPAL TUNNEL SYNDROME OF LEFT WRIST: Primary | ICD-10-CM

## 2025-07-24 PROCEDURE — 99203 OFFICE O/P NEW LOW 30 MIN: CPT

## 2025-07-24 NOTE — PATIENT INSTRUCTIONS
You have been referred for an EMG.  [facility]   [address]  [address]   [phone] or [phone]  Call to schedule follow up with this office once you have SCHEDULED your EMG.    Continue weight-bearing as tolerated.  Continue range of motion exercises as instructed.  Ice and elevate as needed.  Tylenol or Motrin for pain.    We are committed to providing you the best care possible.  If you receive a survey after visiting one of our offices, please take time to share your experience concerning your physician office visit.  These surveys are confidential and no health information about you is shared.  We are eager to improve for you and we are counting on your feedback to help make that happen.
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
## 2025-07-24 NOTE — TELEPHONE ENCOUNTER
Pavan called our office to schedule LUE EMG. The call dropped and he called back. I took his call and scheduled his LUE EMG on 10/28 at 1:15 pm.

## 2025-07-28 ENCOUNTER — HOSPITAL ENCOUNTER (OUTPATIENT)
Dept: PSYCHIATRY | Age: 50
Setting detail: THERAPIES SERIES
Discharge: HOME | End: 2025-07-28
Payer: MEDICARE

## 2025-07-28 PROCEDURE — 90834 PSYTX W PT 45 MINUTES: CPT

## 2025-07-28 PROCEDURE — 80305 DRUG TEST PRSMV DIR OPT OBS: CPT

## 2025-07-28 PROCEDURE — 90853 GROUP PSYCHOTHERAPY: CPT

## 2025-07-29 NOTE — GROUP NOTE
[facility] Therapy Note      7/29/2025    Location:  [facility]        Clients Presents: 1846, 1808, 1231, 1173, 1742, 1810, 1812, 1789, 1840    Clients Absent: 1831      Length of session: 1.5 hours      Group Note: OP      Group Type: Co-Ed      New members were welcomed and introduced.  Norms and expectations of group were discussed.        Content: Understanding Stages of Change: Pre Contemplation and Contemplation       KELVIN Aguirre  7/29/2025 1:36 PM        Co-Therapist: N/A      [facility] Individual Group Progress Note    Pavan Becerra  1975 7/29/2025    Notes on Client Progress in Group      Pavan arrived to group, introduced himself and events leading to arrival: presented check in information: admitted he has tried multiple times to abstain externally motivated but when loss of parents his used escalated.         KELVIN Aguirre  7/29/2025 1:48 PM        Co-Therapist: N/A

## 2025-07-31 ASSESSMENT — ENCOUNTER SYMPTOMS
COUGH: 0
BACK PAIN: 0
FACIAL SWELLING: 0
RHINORRHEA: 0
SHORTNESS OF BREATH: 0
NAUSEA: 0
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
## 2025-08-04 ENCOUNTER — HOSPITAL ENCOUNTER (OUTPATIENT)
Dept: PSYCHIATRY | Age: 50
Setting detail: THERAPIES SERIES
Discharge: HOME | End: 2025-08-04
Payer: MEDICARE

## 2025-08-04 PROCEDURE — 90834 PSYTX W PT 45 MINUTES: CPT

## 2025-08-04 PROCEDURE — 90853 GROUP PSYCHOTHERAPY: CPT

## 2025-08-11 ENCOUNTER — HOSPITAL ENCOUNTER (OUTPATIENT)
Dept: PSYCHIATRY | Age: 50
Setting detail: THERAPIES SERIES
Discharge: HOME | End: 2025-08-11
Payer: COMMERCIAL

## 2025-08-11 PROCEDURE — 90853 GROUP PSYCHOTHERAPY: CPT

## 2025-08-11 PROCEDURE — 90834 PSYTX W PT 45 MINUTES: CPT

## 2025-08-14 ENCOUNTER — OFFICE VISIT (OUTPATIENT)
Dept: FAMILY MEDICINE CLINIC | Age: 50
End: 2025-08-14
Payer: COMMERCIAL

## 2025-08-14 VITALS
RESPIRATION RATE: 18 BRPM | HEART RATE: 74 BPM | WEIGHT: 295.4 LBS | OXYGEN SATURATION: 96 % | SYSTOLIC BLOOD PRESSURE: 138 MMHG | BODY MASS INDEX: 43.75 KG/M2 | HEIGHT: 69 IN | DIASTOLIC BLOOD PRESSURE: 86 MMHG

## 2025-08-14 DIAGNOSIS — F19.11 HISTORY OF DRUG ABUSE (HCC): ICD-10-CM

## 2025-08-14 DIAGNOSIS — S06.9X5S TRAUMATIC BRAIN INJURY, WITH LOSS OF CONSCIOUSNESS GREATER THAN 24 HOURS WITH RETURN TO PRE-EXISTING CONSCIOUS LEVEL, SEQUELA: Primary | ICD-10-CM

## 2025-08-14 DIAGNOSIS — E66.01 MORBID OBESITY WITH BMI OF 40.0-44.9, ADULT (HCC): ICD-10-CM

## 2025-08-14 DIAGNOSIS — S06.306S: ICD-10-CM

## 2025-08-14 DIAGNOSIS — J45.20 MILD INTERMITTENT ASTHMA WITHOUT COMPLICATION: ICD-10-CM

## 2025-08-14 PROBLEM — L97.812 NON-PRESSURE CHRONIC ULCER OF OTHER PART OF RIGHT LOWER LEG WITH FAT LAYER EXPOSED (HCC): Status: ACTIVE | Noted: 2025-08-14

## 2025-08-14 PROBLEM — L97.812 NON-PRESSURE CHRONIC ULCER OF OTHER PART OF RIGHT LOWER LEG WITH FAT LAYER EXPOSED (HCC): Status: RESOLVED | Noted: 2025-08-14 | Resolved: 2025-08-14

## 2025-08-14 PROCEDURE — 99214 OFFICE O/P EST MOD 30 MIN: CPT | Performed by: PHYSICIAN ASSISTANT

## 2025-08-14 PROCEDURE — 3075F SYST BP GE 130 - 139MM HG: CPT | Performed by: PHYSICIAN ASSISTANT

## 2025-08-14 PROCEDURE — G2211 COMPLEX E/M VISIT ADD ON: HCPCS | Performed by: PHYSICIAN ASSISTANT

## 2025-08-14 PROCEDURE — 3079F DIAST BP 80-89 MM HG: CPT | Performed by: PHYSICIAN ASSISTANT

## 2025-08-14 RX ORDER — MONTELUKAST SODIUM 10 MG/1
10 TABLET ORAL NIGHTLY
Qty: 30 TABLET | Refills: 5 | Status: SHIPPED | OUTPATIENT
Start: 2025-08-14

## 2025-08-14 RX ORDER — PHENTERMINE HYDROCHLORIDE 37.5 MG/1
37.5 TABLET ORAL
Qty: 30 TABLET | Refills: 0 | Status: SHIPPED | OUTPATIENT
Start: 2025-08-14 | End: 2025-09-13

## 2025-08-14 ASSESSMENT — PATIENT HEALTH QUESTIONNAIRE - PHQ9
2. FEELING DOWN, DEPRESSED OR HOPELESS: NOT AT ALL
SUM OF ALL RESPONSES TO PHQ QUESTIONS 1-9: 0
1. LITTLE INTEREST OR PLEASURE IN DOING THINGS: NOT AT ALL
SUM OF ALL RESPONSES TO PHQ QUESTIONS 1-9: 0

## 2025-08-18 ENCOUNTER — HOSPITAL ENCOUNTER (OUTPATIENT)
Dept: PSYCHIATRY | Age: 50
Setting detail: THERAPIES SERIES
Discharge: HOME | End: 2025-08-18
Payer: COMMERCIAL

## 2025-08-18 PROCEDURE — 90853 GROUP PSYCHOTHERAPY: CPT

## 2025-08-18 PROCEDURE — 90834 PSYTX W PT 45 MINUTES: CPT

## 2025-08-25 ENCOUNTER — HOSPITAL ENCOUNTER (OUTPATIENT)
Dept: PSYCHIATRY | Age: 50
Setting detail: THERAPIES SERIES
Discharge: HOME | End: 2025-08-25
Payer: COMMERCIAL